# Patient Record
Sex: FEMALE | Race: WHITE | NOT HISPANIC OR LATINO | URBAN - METROPOLITAN AREA
[De-identification: names, ages, dates, MRNs, and addresses within clinical notes are randomized per-mention and may not be internally consistent; named-entity substitution may affect disease eponyms.]

---

## 2017-06-01 ENCOUNTER — OUTPATIENT (OUTPATIENT)
Dept: OUTPATIENT SERVICES | Facility: HOSPITAL | Age: 62
LOS: 1 days | Discharge: HOME | End: 2017-06-01

## 2017-06-28 DIAGNOSIS — R92.8 OTHER ABNORMAL AND INCONCLUSIVE FINDINGS ON DIAGNOSTIC IMAGING OF BREAST: ICD-10-CM

## 2018-01-09 ENCOUNTER — TRANSCRIPTION ENCOUNTER (OUTPATIENT)
Age: 63
End: 2018-01-09

## 2018-01-29 ENCOUNTER — TRANSCRIPTION ENCOUNTER (OUTPATIENT)
Age: 63
End: 2018-01-29

## 2018-03-07 ENCOUNTER — APPOINTMENT (OUTPATIENT)
Dept: PLASTIC SURGERY | Facility: CLINIC | Age: 63
End: 2018-03-07

## 2019-05-28 ENCOUNTER — TRANSCRIPTION ENCOUNTER (OUTPATIENT)
Age: 64
End: 2019-05-28

## 2019-07-21 ENCOUNTER — OUTPATIENT (OUTPATIENT)
Dept: OUTPATIENT SERVICES | Facility: HOSPITAL | Age: 64
LOS: 1 days | Discharge: HOME | End: 2019-07-21
Payer: MEDICARE

## 2019-07-21 DIAGNOSIS — M25.552 PAIN IN LEFT HIP: ICD-10-CM

## 2019-07-21 DIAGNOSIS — M25.551 PAIN IN RIGHT HIP: ICD-10-CM

## 2019-07-21 DIAGNOSIS — M54.5 LOW BACK PAIN: ICD-10-CM

## 2019-07-21 PROCEDURE — 73522 X-RAY EXAM HIPS BI 3-4 VIEWS: CPT | Mod: 26

## 2019-07-21 PROCEDURE — 72110 X-RAY EXAM L-2 SPINE 4/>VWS: CPT | Mod: 26

## 2022-08-22 ENCOUNTER — INPATIENT (INPATIENT)
Facility: HOSPITAL | Age: 67
LOS: 9 days | Discharge: HOME | End: 2022-09-01
Attending: INTERNAL MEDICINE | Admitting: INTERNAL MEDICINE

## 2022-08-22 VITALS
DIASTOLIC BLOOD PRESSURE: 91 MMHG | WEIGHT: 119.05 LBS | SYSTOLIC BLOOD PRESSURE: 209 MMHG | HEART RATE: 76 BPM | OXYGEN SATURATION: 99 % | RESPIRATION RATE: 18 BRPM | TEMPERATURE: 98 F

## 2022-08-22 PROCEDURE — 99053 MED SERV 10PM-8AM 24 HR FAC: CPT

## 2022-08-22 PROCEDURE — 36620 INSERTION CATHETER ARTERY: CPT

## 2022-08-22 PROCEDURE — 99291 CRITICAL CARE FIRST HOUR: CPT | Mod: 25

## 2022-08-22 PROCEDURE — 76937 US GUIDE VASCULAR ACCESS: CPT | Mod: 26

## 2022-08-22 PROCEDURE — 36556 INSERT NON-TUNNEL CV CATH: CPT

## 2022-08-22 RX ORDER — SODIUM CHLORIDE 9 MG/ML
1000 INJECTION INTRAMUSCULAR; INTRAVENOUS; SUBCUTANEOUS ONCE
Refills: 0 | Status: COMPLETED | OUTPATIENT
Start: 2022-08-22 | End: 2022-08-22

## 2022-08-22 RX ADMIN — SODIUM CHLORIDE 1000 MILLILITER(S): 9 INJECTION INTRAMUSCULAR; INTRAVENOUS; SUBCUTANEOUS at 23:57

## 2022-08-22 NOTE — ED PROVIDER NOTE - OBJECTIVE STATEMENT
66 yo F with no significant PMHx presents to the ED c/o moderate left flank pain that started around 9PM while she was watching TV. Pain was sharp, constant and radiated to left lower abdomen. She admits to associated nausea. Upon ED arrival pain was improved. She denies hx of similar symptoms in the past. She denies other complaints. Pt denies fever, chills, vomiting, diarrhea, headache, dizziness, weakness, chest pain, SOB, back pain, LOC, trauma, urinary symptoms, cough, calf pain/swelling, recent travel, recent surgery.

## 2022-08-22 NOTE — ED PROVIDER NOTE - CRITICAL CARE ATTENDING CONTRIBUTION TO CARE
pt co L flank pain rad to left ab. assoc with n, dry heaves. sudden, since 9pm. better now. no fever. no urinary sx.     pt in nad, ctab, rrr, ab soft, nt, nd. no cvat.    labs, imagign, supportive care  reassess    see mdm

## 2022-08-22 NOTE — ED PROVIDER NOTE - NS ED ATTENDING STATEMENT MOD
This was a shared visit with the KOLTON. I reviewed and verified the documentation and independently performed the documented: I have personally provided the amount of critical care time documented below excluding time spent on separate procedures.

## 2022-08-22 NOTE — ED PROVIDER NOTE - PHYSICAL EXAMINATION
VITAL SIGNS: I have reviewed nursing notes and confirm.  CONSTITUTIONAL: 66 yo F laying on stretcher; in no acute distress.  SKIN: Skin exam is warm and dry, no acute rash.  HEAD: Normocephalic; atraumatic.  EYES: Conjunctiva and sclera clear.  ENT: No nasal discharge; airway clear.  CARD: S1, S2 normal; no murmurs, gallops, or rubs. Regular rate and rhythm.  RESP: No wheezes, rales or rhonchi. Speaking in full sentences.   ABD: Normal bowel sounds; soft; non-distended; non-tender; No rebound or guarding. No CVA tenderness.  EXT: Normal ROM. No clubbing, cyanosis or edema.  NEURO: Alert, oriented. Grossly unremarkable. No focal deficits.

## 2022-08-22 NOTE — ED PROVIDER NOTE - CLINICAL SUMMARY MEDICAL DECISION MAKING FREE TEXT BOX
pt with acute abdominal pain, CT shows celiac artery hemmorhage.  pt became hypotensive. IR called, massive transfusion protocol initiated. vascular called.  admit pt to ICU. pt to go to IR asap.

## 2022-08-22 NOTE — ED PROVIDER NOTE - ATTENDING APP SHARED VISIT CONTRIBUTION OF CARE
pt co L flank pain rad to left ab. assoc with n, dry heaves. sudden, since 9pm. better now. no fever. no urinary sx.     pt in nad, ctab, rrr, ab soft, nt, nd. no cvat.    labs, imagign, supportive care  reassess

## 2022-08-22 NOTE — ED PROVIDER NOTE - NS ED ROS FT
Review of Systems  Constitutional:  No fever, chills.  Eyes:  No visual changes, eye pain, or discharge.  ENMT:  No hearing changes, pain, or discharge. No nasal congestion, discharge, or bleeding. No throat pain, swelling, or difficulty swallowing.  Cardiac:  No chest pain, palpitations, syncope, or edema.  Respiratory:  No dyspnea, cough. No hemoptysis.  GI:  No vomiting, diarrhea. (+) abd pain, nausea  :  No dysuria, hematuria, frequency, or burning.   MS:  No back pain. (+) flank pain  Skin:  No skin rash, pruritis, jaundice, or lesions.  Neuro:  No headache, dizziness, loss of sensation, or focal weakness.  No change in mental status.

## 2022-08-23 LAB
ABO RH CONFIRMATION: SIGNIFICANT CHANGE UP
ALBUMIN SERPL ELPH-MCNC: 3.5 G/DL — SIGNIFICANT CHANGE UP (ref 3.5–5.2)
ALBUMIN SERPL ELPH-MCNC: 4.6 G/DL — SIGNIFICANT CHANGE UP (ref 3.5–5.2)
ALP SERPL-CCNC: 60 U/L — SIGNIFICANT CHANGE UP (ref 30–115)
ALP SERPL-CCNC: 76 U/L — SIGNIFICANT CHANGE UP (ref 30–115)
ALT FLD-CCNC: 18 U/L — SIGNIFICANT CHANGE UP (ref 0–41)
ALT FLD-CCNC: 21 U/L — SIGNIFICANT CHANGE UP (ref 0–41)
ANION GAP SERPL CALC-SCNC: 11 MMOL/L — SIGNIFICANT CHANGE UP (ref 7–14)
ANION GAP SERPL CALC-SCNC: 11 MMOL/L — SIGNIFICANT CHANGE UP (ref 7–14)
APPEARANCE UR: CLEAR — SIGNIFICANT CHANGE UP
APTT BLD: 27.7 SEC — SIGNIFICANT CHANGE UP (ref 27–39.2)
APTT BLD: 29.4 SEC — SIGNIFICANT CHANGE UP (ref 27–39.2)
AST SERPL-CCNC: 21 U/L — SIGNIFICANT CHANGE UP (ref 0–41)
AST SERPL-CCNC: 23 U/L — SIGNIFICANT CHANGE UP (ref 0–41)
BASOPHILS # BLD AUTO: 0.01 K/UL — SIGNIFICANT CHANGE UP (ref 0–0.2)
BASOPHILS # BLD AUTO: 0.01 K/UL — SIGNIFICANT CHANGE UP (ref 0–0.2)
BASOPHILS # BLD AUTO: 0.02 K/UL — SIGNIFICANT CHANGE UP (ref 0–0.2)
BASOPHILS # BLD AUTO: 0.03 K/UL — SIGNIFICANT CHANGE UP (ref 0–0.2)
BASOPHILS NFR BLD AUTO: 0.1 % — SIGNIFICANT CHANGE UP (ref 0–1)
BASOPHILS NFR BLD AUTO: 0.1 % — SIGNIFICANT CHANGE UP (ref 0–1)
BASOPHILS NFR BLD AUTO: 0.2 % — SIGNIFICANT CHANGE UP (ref 0–1)
BASOPHILS NFR BLD AUTO: 0.3 % — SIGNIFICANT CHANGE UP (ref 0–1)
BILIRUB DIRECT SERPL-MCNC: <0.2 MG/DL — SIGNIFICANT CHANGE UP (ref 0–0.3)
BILIRUB INDIRECT FLD-MCNC: >0.2 MG/DL — SIGNIFICANT CHANGE UP (ref 0.2–1.2)
BILIRUB SERPL-MCNC: 0.4 MG/DL — SIGNIFICANT CHANGE UP (ref 0.2–1.2)
BILIRUB SERPL-MCNC: 1.5 MG/DL — HIGH (ref 0.2–1.2)
BILIRUB UR-MCNC: NEGATIVE — SIGNIFICANT CHANGE UP
BLD GP AB SCN SERPL QL: SIGNIFICANT CHANGE UP
BUN SERPL-MCNC: 15 MG/DL — SIGNIFICANT CHANGE UP (ref 10–20)
BUN SERPL-MCNC: 16 MG/DL — SIGNIFICANT CHANGE UP (ref 10–20)
CALCIUM SERPL-MCNC: 10 MG/DL — SIGNIFICANT CHANGE UP (ref 8.5–10.1)
CALCIUM SERPL-MCNC: 8.5 MG/DL — SIGNIFICANT CHANGE UP (ref 8.5–10.1)
CHLORIDE SERPL-SCNC: 102 MMOL/L — SIGNIFICANT CHANGE UP (ref 98–110)
CHLORIDE SERPL-SCNC: 110 MMOL/L — SIGNIFICANT CHANGE UP (ref 98–110)
CO2 SERPL-SCNC: 20 MMOL/L — SIGNIFICANT CHANGE UP (ref 17–32)
CO2 SERPL-SCNC: 29 MMOL/L — SIGNIFICANT CHANGE UP (ref 17–32)
COLOR SPEC: SIGNIFICANT CHANGE UP
CREAT SERPL-MCNC: 0.5 MG/DL — LOW (ref 0.7–1.5)
CREAT SERPL-MCNC: 0.8 MG/DL — SIGNIFICANT CHANGE UP (ref 0.7–1.5)
DIFF PNL FLD: NEGATIVE — SIGNIFICANT CHANGE UP
EGFR: 101 ML/MIN/1.73M2 — SIGNIFICANT CHANGE UP
EGFR: 80 ML/MIN/1.73M2 — SIGNIFICANT CHANGE UP
EOSINOPHIL # BLD AUTO: 0 K/UL — SIGNIFICANT CHANGE UP (ref 0–0.7)
EOSINOPHIL # BLD AUTO: 0.01 K/UL — SIGNIFICANT CHANGE UP (ref 0–0.7)
EOSINOPHIL # BLD AUTO: 0.01 K/UL — SIGNIFICANT CHANGE UP (ref 0–0.7)
EOSINOPHIL # BLD AUTO: 0.04 K/UL — SIGNIFICANT CHANGE UP (ref 0–0.7)
EOSINOPHIL NFR BLD AUTO: 0 % — SIGNIFICANT CHANGE UP (ref 0–8)
EOSINOPHIL NFR BLD AUTO: 0.1 % — SIGNIFICANT CHANGE UP (ref 0–8)
EOSINOPHIL NFR BLD AUTO: 0.1 % — SIGNIFICANT CHANGE UP (ref 0–8)
EOSINOPHIL NFR BLD AUTO: 0.4 % — SIGNIFICANT CHANGE UP (ref 0–8)
GLUCOSE SERPL-MCNC: 191 MG/DL — HIGH (ref 70–99)
GLUCOSE SERPL-MCNC: 220 MG/DL — HIGH (ref 70–99)
GLUCOSE UR QL: ABNORMAL
HCT VFR BLD CALC: 33.3 % — LOW (ref 37–47)
HCT VFR BLD CALC: 36.6 % — LOW (ref 37–47)
HCT VFR BLD CALC: 37.6 % — SIGNIFICANT CHANGE UP (ref 37–47)
HCT VFR BLD CALC: 41.6 % — SIGNIFICANT CHANGE UP (ref 37–47)
HGB BLD-MCNC: 11.6 G/DL — LOW (ref 12–16)
HGB BLD-MCNC: 12.3 G/DL — SIGNIFICANT CHANGE UP (ref 12–16)
HGB BLD-MCNC: 12.8 G/DL — SIGNIFICANT CHANGE UP (ref 12–16)
HGB BLD-MCNC: 13.7 G/DL — SIGNIFICANT CHANGE UP (ref 12–16)
IMM GRANULOCYTES NFR BLD AUTO: 0.4 % — HIGH (ref 0.1–0.3)
IMM GRANULOCYTES NFR BLD AUTO: 0.4 % — HIGH (ref 0.1–0.3)
IMM GRANULOCYTES NFR BLD AUTO: 0.5 % — HIGH (ref 0.1–0.3)
IMM GRANULOCYTES NFR BLD AUTO: 0.5 % — HIGH (ref 0.1–0.3)
INR BLD: 1.09 RATIO — SIGNIFICANT CHANGE UP (ref 0.65–1.3)
INR BLD: 1.1 RATIO — SIGNIFICANT CHANGE UP (ref 0.65–1.3)
KETONES UR-MCNC: ABNORMAL
LACTATE SERPL-SCNC: 1.4 MMOL/L — SIGNIFICANT CHANGE UP (ref 0.7–2)
LACTATE SERPL-SCNC: 2.5 MMOL/L — HIGH (ref 0.7–2)
LEUKOCYTE ESTERASE UR-ACNC: NEGATIVE — SIGNIFICANT CHANGE UP
LIDOCAIN IGE QN: 20 U/L — SIGNIFICANT CHANGE UP (ref 7–60)
LYMPHOCYTES # BLD AUTO: 0.46 K/UL — LOW (ref 1.2–3.4)
LYMPHOCYTES # BLD AUTO: 0.7 K/UL — LOW (ref 1.2–3.4)
LYMPHOCYTES # BLD AUTO: 0.84 K/UL — LOW (ref 1.2–3.4)
LYMPHOCYTES # BLD AUTO: 1.04 K/UL — LOW (ref 1.2–3.4)
LYMPHOCYTES # BLD AUTO: 14.3 % — LOW (ref 20.5–51.1)
LYMPHOCYTES # BLD AUTO: 4.5 % — LOW (ref 20.5–51.1)
LYMPHOCYTES # BLD AUTO: 8.7 % — LOW (ref 20.5–51.1)
LYMPHOCYTES # BLD AUTO: 9.5 % — LOW (ref 20.5–51.1)
MAGNESIUM SERPL-MCNC: 1.6 MG/DL — LOW (ref 1.8–2.4)
MCHC RBC-ENTMCNC: 27.6 PG — SIGNIFICANT CHANGE UP (ref 27–31)
MCHC RBC-ENTMCNC: 28 PG — SIGNIFICANT CHANGE UP (ref 27–31)
MCHC RBC-ENTMCNC: 28 PG — SIGNIFICANT CHANGE UP (ref 27–31)
MCHC RBC-ENTMCNC: 28.8 PG — SIGNIFICANT CHANGE UP (ref 27–31)
MCHC RBC-ENTMCNC: 32.9 G/DL — SIGNIFICANT CHANGE UP (ref 32–37)
MCHC RBC-ENTMCNC: 33.6 G/DL — SIGNIFICANT CHANGE UP (ref 32–37)
MCHC RBC-ENTMCNC: 34 G/DL — SIGNIFICANT CHANGE UP (ref 32–37)
MCHC RBC-ENTMCNC: 34.8 G/DL — SIGNIFICANT CHANGE UP (ref 32–37)
MCV RBC AUTO: 80.4 FL — LOW (ref 81–99)
MCV RBC AUTO: 83.4 FL — SIGNIFICANT CHANGE UP (ref 81–99)
MCV RBC AUTO: 83.9 FL — SIGNIFICANT CHANGE UP (ref 81–99)
MCV RBC AUTO: 84.7 FL — SIGNIFICANT CHANGE UP (ref 81–99)
MONOCYTES # BLD AUTO: 0.31 K/UL — SIGNIFICANT CHANGE UP (ref 0.1–0.6)
MONOCYTES # BLD AUTO: 0.44 K/UL — SIGNIFICANT CHANGE UP (ref 0.1–0.6)
MONOCYTES # BLD AUTO: 0.48 K/UL — SIGNIFICANT CHANGE UP (ref 0.1–0.6)
MONOCYTES # BLD AUTO: 0.61 K/UL — HIGH (ref 0.1–0.6)
MONOCYTES NFR BLD AUTO: 4.2 % — SIGNIFICANT CHANGE UP (ref 1.7–9.3)
MONOCYTES NFR BLD AUTO: 4.6 % — SIGNIFICANT CHANGE UP (ref 1.7–9.3)
MONOCYTES NFR BLD AUTO: 4.7 % — SIGNIFICANT CHANGE UP (ref 1.7–9.3)
MONOCYTES NFR BLD AUTO: 8.4 % — SIGNIFICANT CHANGE UP (ref 1.7–9.3)
NEUTROPHILS # BLD AUTO: 5.6 K/UL — SIGNIFICANT CHANGE UP (ref 1.4–6.5)
NEUTROPHILS # BLD AUTO: 6.3 K/UL — SIGNIFICANT CHANGE UP (ref 1.4–6.5)
NEUTROPHILS # BLD AUTO: 8.27 K/UL — HIGH (ref 1.4–6.5)
NEUTROPHILS # BLD AUTO: 9.18 K/UL — HIGH (ref 1.4–6.5)
NEUTROPHILS NFR BLD AUTO: 76.8 % — HIGH (ref 42.2–75.2)
NEUTROPHILS NFR BLD AUTO: 85.5 % — HIGH (ref 42.2–75.2)
NEUTROPHILS NFR BLD AUTO: 85.6 % — HIGH (ref 42.2–75.2)
NEUTROPHILS NFR BLD AUTO: 90.1 % — HIGH (ref 42.2–75.2)
NITRITE UR-MCNC: NEGATIVE — SIGNIFICANT CHANGE UP
NRBC # BLD: 0 /100 WBCS — SIGNIFICANT CHANGE UP (ref 0–0)
PH UR: 8 — SIGNIFICANT CHANGE UP (ref 5–8)
PLATELET # BLD AUTO: 149 K/UL — SIGNIFICANT CHANGE UP (ref 130–400)
PLATELET # BLD AUTO: 170 K/UL — SIGNIFICANT CHANGE UP (ref 130–400)
PLATELET # BLD AUTO: 205 K/UL — SIGNIFICANT CHANGE UP (ref 130–400)
PLATELET # BLD AUTO: 206 K/UL — SIGNIFICANT CHANGE UP (ref 130–400)
POTASSIUM SERPL-MCNC: 4.5 MMOL/L — SIGNIFICANT CHANGE UP (ref 3.5–5)
POTASSIUM SERPL-MCNC: 4.9 MMOL/L — SIGNIFICANT CHANGE UP (ref 3.5–5)
POTASSIUM SERPL-SCNC: 4.5 MMOL/L — SIGNIFICANT CHANGE UP (ref 3.5–5)
POTASSIUM SERPL-SCNC: 4.9 MMOL/L — SIGNIFICANT CHANGE UP (ref 3.5–5)
PROT SERPL-MCNC: 5.3 G/DL — LOW (ref 6–8)
PROT SERPL-MCNC: 6.8 G/DL — SIGNIFICANT CHANGE UP (ref 6–8)
PROT UR-MCNC: SIGNIFICANT CHANGE UP
PROTHROM AB SERPL-ACNC: 12.5 SEC — SIGNIFICANT CHANGE UP (ref 9.95–12.87)
PROTHROM AB SERPL-ACNC: 12.6 SEC — SIGNIFICANT CHANGE UP (ref 9.95–12.87)
RBC # BLD: 4.14 M/UL — LOW (ref 4.2–5.4)
RBC # BLD: 4.39 M/UL — SIGNIFICANT CHANGE UP (ref 4.2–5.4)
RBC # BLD: 4.44 M/UL — SIGNIFICANT CHANGE UP (ref 4.2–5.4)
RBC # BLD: 4.96 M/UL — SIGNIFICANT CHANGE UP (ref 4.2–5.4)
RBC # FLD: 13.7 % — SIGNIFICANT CHANGE UP (ref 11.5–14.5)
RBC # FLD: 13.8 % — SIGNIFICANT CHANGE UP (ref 11.5–14.5)
RBC # FLD: 14.2 % — SIGNIFICANT CHANGE UP (ref 11.5–14.5)
RBC # FLD: 14.3 % — SIGNIFICANT CHANGE UP (ref 11.5–14.5)
SARS-COV-2 RNA SPEC QL NAA+PROBE: SIGNIFICANT CHANGE UP
SODIUM SERPL-SCNC: 141 MMOL/L — SIGNIFICANT CHANGE UP (ref 135–146)
SODIUM SERPL-SCNC: 142 MMOL/L — SIGNIFICANT CHANGE UP (ref 135–146)
SP GR SPEC: 1.02 — SIGNIFICANT CHANGE UP (ref 1.01–1.03)
UROBILINOGEN FLD QL: SIGNIFICANT CHANGE UP
WBC # BLD: 10.19 K/UL — SIGNIFICANT CHANGE UP (ref 4.8–10.8)
WBC # BLD: 7.29 K/UL — SIGNIFICANT CHANGE UP (ref 4.8–10.8)
WBC # BLD: 7.37 K/UL — SIGNIFICANT CHANGE UP (ref 4.8–10.8)
WBC # BLD: 9.67 K/UL — SIGNIFICANT CHANGE UP (ref 4.8–10.8)
WBC # FLD AUTO: 10.19 K/UL — SIGNIFICANT CHANGE UP (ref 4.8–10.8)
WBC # FLD AUTO: 7.29 K/UL — SIGNIFICANT CHANGE UP (ref 4.8–10.8)
WBC # FLD AUTO: 7.37 K/UL — SIGNIFICANT CHANGE UP (ref 4.8–10.8)
WBC # FLD AUTO: 9.67 K/UL — SIGNIFICANT CHANGE UP (ref 4.8–10.8)

## 2022-08-23 PROCEDURE — 74174 CTA ABD&PLVS W/CONTRAST: CPT | Mod: 26,MA

## 2022-08-23 PROCEDURE — 99221 1ST HOSP IP/OBS SF/LOW 40: CPT

## 2022-08-23 PROCEDURE — 36247 INS CATH ABD/L-EXT ART 3RD: CPT | Mod: RT

## 2022-08-23 PROCEDURE — G1004: CPT

## 2022-08-23 PROCEDURE — 36248 INS CATH ABD/L-EXT ART ADDL: CPT

## 2022-08-23 PROCEDURE — 74177 CT ABD & PELVIS W/CONTRAST: CPT | Mod: 26,59,ME

## 2022-08-23 PROCEDURE — 37242 VASC EMBOLIZE/OCCLUDE ARTERY: CPT

## 2022-08-23 PROCEDURE — 76937 US GUIDE VASCULAR ACCESS: CPT | Mod: 26

## 2022-08-23 PROCEDURE — 71045 X-RAY EXAM CHEST 1 VIEW: CPT | Mod: 26

## 2022-08-23 RX ORDER — ACETAMINOPHEN 500 MG
1000 TABLET ORAL ONCE
Refills: 0 | Status: COMPLETED | OUTPATIENT
Start: 2022-08-23 | End: 2022-08-23

## 2022-08-23 RX ORDER — CHLORHEXIDINE GLUCONATE 213 G/1000ML
1 SOLUTION TOPICAL
Refills: 0 | Status: DISCONTINUED | OUTPATIENT
Start: 2022-08-23 | End: 2022-09-01

## 2022-08-23 RX ORDER — FENTANYL CITRATE 50 UG/ML
50 INJECTION INTRAVENOUS ONCE
Refills: 0 | Status: DISCONTINUED | OUTPATIENT
Start: 2022-08-23 | End: 2022-08-23

## 2022-08-23 RX ORDER — ONDANSETRON 8 MG/1
4 TABLET, FILM COATED ORAL ONCE
Refills: 0 | Status: COMPLETED | OUTPATIENT
Start: 2022-08-23 | End: 2022-08-23

## 2022-08-23 RX ORDER — ACETAMINOPHEN 500 MG
650 TABLET ORAL EVERY 6 HOURS
Refills: 0 | Status: DISCONTINUED | OUTPATIENT
Start: 2022-08-23 | End: 2022-09-01

## 2022-08-23 RX ORDER — MORPHINE SULFATE 50 MG/1
4 CAPSULE, EXTENDED RELEASE ORAL ONCE
Refills: 0 | Status: DISCONTINUED | OUTPATIENT
Start: 2022-08-23 | End: 2022-08-23

## 2022-08-23 RX ORDER — MAGNESIUM SULFATE 500 MG/ML
2 VIAL (ML) INJECTION ONCE
Refills: 0 | Status: COMPLETED | OUTPATIENT
Start: 2022-08-23 | End: 2022-08-23

## 2022-08-23 RX ADMIN — FENTANYL CITRATE 50 MICROGRAM(S): 50 INJECTION INTRAVENOUS at 06:24

## 2022-08-23 RX ADMIN — Medication 400 MILLIGRAM(S): at 21:51

## 2022-08-23 RX ADMIN — MORPHINE SULFATE 4 MILLIGRAM(S): 50 CAPSULE, EXTENDED RELEASE ORAL at 05:08

## 2022-08-23 RX ADMIN — Medication 1000 MILLIGRAM(S): at 22:15

## 2022-08-23 RX ADMIN — ONDANSETRON 4 MILLIGRAM(S): 8 TABLET, FILM COATED ORAL at 13:15

## 2022-08-23 RX ADMIN — FENTANYL CITRATE 50 MICROGRAM(S): 50 INJECTION INTRAVENOUS at 07:24

## 2022-08-23 RX ADMIN — Medication 25 GRAM(S): at 12:42

## 2022-08-23 NOTE — ED PROCEDURE NOTE - CPROC ED TIME OUT STATEMENT1
“Patient's name, , procedure and correct site were confirmed during the [place] Timeout.”
“Patient's name, , procedure and correct site were confirmed during the [place] Timeout.”

## 2022-08-23 NOTE — H&P ADULT - NSHPLABSRESULTS_GEN_ALL_CORE
LABS:  cret                        12.3   7.37  )-----------( 206      ( 23 Aug 2022 04:40 )             36.6     08-23    142  |  102  |  16  ----------------------------<  191<H>  4.9   |  29  |  0.8    Ca    10.0      23 Aug 2022 00:10    TPro  6.8  /  Alb  4.6  /  TBili  0.4  /  DBili  <0.2  /  AST  23  /  ALT  21  /  AlkPhos  76  08-23    PT/INR - ( 23 Aug 2022 04:40 )   PT: 12.60 sec;   INR: 1.10 ratio         PTT - ( 23 Aug 2022 04:40 )  PTT:29.4 sec      imaging:   - CT a/p IV con: Large volume dense ascites compatible with hemoperitoneum. Active contrast extravasation is seen in the subhepatic region, likely arising from a distal celiac branch vessel in the hepatic territory. A possible second smaller region of active extravasation is seen adjacent to a vessel in the region of the pancreatic tail (4-48, 4-43). A dual source of bleeding is not excluded, and a CTA may be considered for further evaluation.  - CT a/p angio: Mildly increased large volume hemoperitoneum with prominent active extravasation in the subhepatic region originating from a distal celiac branch in the hepatic territory. A second focus of extravasation is not seen on the current exam.

## 2022-08-23 NOTE — PATIENT PROFILE ADULT - FUNCTIONAL ASSESSMENT - BASIC MOBILITY 6.
3-calculated by average/Not able to assess (calculate score using [facility] averaging method)

## 2022-08-23 NOTE — ED PROCEDURE NOTE - NS ED ATTENDING STATEMENT MOD
This was a shared visit with the KOLTON. I reviewed and verified the documentation and independently performed the documented:
Attending with

## 2022-08-23 NOTE — PROGRESS NOTE ADULT - SUBJECTIVE AND OBJECTIVE BOX
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 The right gastric artery had a focal pseudoaneyrsm with diffuse spasm. The right gastric artery was coiled with four 2 mm penumbra coils. Post embolization revealed stasis of the vessel. Angiogram of the superior mesenteric artery revealed additional vascular irregularities and psueduaneurysms without active extravasation.    Specimens Removed: None    Implants: 2 mm low profile penumbra coils x 4    Complications: None    Condition/Disposition:  Ok to return to unit.   Please keep right lower extremity completely straight for 4 hours.      Please call Interventional Radiology [phone] with any questions, concerns, or issues.         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 The right gastric artery had a focal pseudoaneyrsm with diffuse spasm. Based on CT findings this was the artery that was bleeding and therefore it was decided to embolize the artery.  The right gastric artery was coiled with four 2 mm penumbra coils. Post embolization revealed stasis of the vessel with no evidence of non-target embolization, there was onobstructed flow to the left hepatic artery. Angiogram of the superior mesenteric artery revealed additional vascular irregularities and psueduaneurysms without active extravasation. 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     Specimens Removed: None    Implants: 2 mm low profile penumbra coils x 4    Complications: None    Condition/Disposition:  Ok to return to unit.   Please keep right lower extremity completely straight for 4 hours.      Please call Interventional Radiology [phone] with any questions, concerns, or issues.

## 2022-08-23 NOTE — PROCEDURAL SAFETY CHECKLIST WITH OR WITHOUT SEDATION - NSPOSTCOMMENTFT_GEN_ALL_CORE
Right IJ placed in ED by NICHO Medrano. Central Line sutured into place. Pt tolerated procedure well, sterility maintained throughout.
Right radial A-Line Cath placed in ED. Pt tolerated procedure well. Sterility maintained throughout procedure.

## 2022-08-23 NOTE — H&P ADULT - HISTORY OF PRESENT ILLNESS
66yo F w/ no significant pmhx presented to ED with left flank pain that started around 9pm last night. Patient was relaxing/watching TV when she suddenly felt this and presented to the ED. Initially patient was hypertensive and there was an initial concern for possible kidney stone. However CT a/p showed hemoperitoneum with active extravesation of celiac artery. Patient denied any past trauma or falls. Denies being on any medications including blood thinners. Denies all other complaints except her left flank/back pain.     Patient then became hemodynamically unstable BP 79/40 and became dizzy and diaphoretic. Code fusion was called. Patient received 4 total prbcs and will get 1u of ffp and platelets. IR was called and are planning for emergent embolization. Vascular also on board.      labs: hgb 13.7 -> 12.3, coags wnl, lactate 2.5 -> 1.4  imaging:   - CT a/p IV con: Large volume dense ascites compatible with hemoperitoneum. Active contrast extravasation is seen in the subhepatic region, likely arising from a distal celiac branch vessel in the hepatic territory. A possible second smaller region of active extravasation is seen adjacent to a vessel in the region of the pancreatic tail (4-48, 4-43). A dual source of bleeding is not excluded, and a CTA may be considered for further evaluation.  - CT a/p angio: Mildly increased large volume hemoperitoneum with prominent active extravasation in the subhepatic region originating from a distal celiac branch in the hepatic territory. A second focus of extravasation is not seen on the current exam.

## 2022-08-23 NOTE — PROCEDURAL SAFETY CHECKLIST WITH OR WITHOUT SEDATION - NSPREIMAGEREVIEW_GEN_ALL_CORE
History Of Present Illness





59 y/o female comes in to ED complaining of a 5 day history of body aches, nasal

congestion, productive cough with green mucus, headache, sore throat, and runny 

nose. Patient states that 2 days ago after she ate melon, she felt her lips and 

tongue become itchy. Patient reports taking claritin and the itching resolved. 

She denies any sick contacts, chest pain, SOB, abdominal pain, nausea, vomiting,

or diarrhea.





Time Seen by Provider: 02/16/19 10:50


Chief Complaint (Nursing): Cough, Cold, Congestion


History Per: Patient


History/Exam Limitations: no limitations


Onset/Duration Of Symptoms: Days


Current Symptoms Are (Timing): Still Present





Past Medical History


Reviewed: Historical Data, Nursing Documentation, Vital Signs


Vital Signs: 





                                Last Vital Signs











Temp  98.2 F   02/16/19 10:53


 


Pulse  76   02/16/19 10:53


 


Resp  18   02/16/19 10:53


 


BP      


 


Pulse Ox  99   02/16/19 10:53














- Medical History


PMH: Asthma, Back Problems, HTN


   Denies: Chronic Kidney Disease





- [facility] Procedures











INJECT/INFUSE NEC (11/14/13)








Family History: States: No Known Family Hx





- Social History


Hx Tobacco Use: No


Hx Alcohol Use: No


Hx Substance Use: No





- Immunization History


Hx Tetanus Toxoid Vaccination: No


Hx Influenza Vaccination: Yes


Hx Pneumococcal Vaccination: Yes





Review Of Systems


Constitutional: Positive for: Other (Body aches).  Negative for: Fever, Chills


ENT: Positive for: Nose Discharge, Nose Congestion, Throat Pain (sore throat)


Cardiovascular: Negative for: Chest Pain


Respiratory: Positive for: Cough (productive), Sputum (green).  Negative for: 

Shortness of Breath


Gastrointestinal: Negative for: Nausea, Vomiting, Abdominal Pain, Diarrhea


Neurological: Positive for: Headache





Physical Exam





- Physical Exam


Appears: Non-toxic, No Acute Distress


Skin: Warm, Dry


Head: Atraumatic, Normacephalic


Eye(s): bilateral: Normal Inspection


Nose: Other (rhinorrhea)


Oral Mucosa: Moist


Throat: Erythema (mild), No Exudate, No Other (Swelling)


Neck: Supple


Cardiovascular: Rhythm Regular, No Murmur


Respiratory: No Decreased Breath Sounds, No Rales, No Rhonchi, Wheezing 

(expiratory wheezing), Other (coughing intermittently)


Extremity: Bilateral: Atraumatic, Normal Color And Temperature, Normal ROM


Neurological/Psych: Oriented x3, Normal Speech





ED Course And Treatment


O2 Sat by Pulse Oximetry: 99 (RA)


Pulse Ox Interpretation: Normal





- Other Rad


  ** CXR


X-Ray: Read By Radiologist


Interpretation: FINDINGS:  LUNGS:  No active pulmonary disease.  PLEURA:  No 

significant pleural effusion identified. No pneumothorax apparent.  

CARDIOVASCULAR:  No aortic atherosclerotic calcification present.  Normal 

cardiac size. No pulmonary vascular congestion.  OSSEOUS STRUCTURES:  No 

significant abnormalities.  VISUALIZED UPPER ABDOMEN:  Normal.  OTHER FINDINGS: 

None.  IMPRESSION:  No active disease.


Progress Note: Chest XR ordered, showed no infiltrates. Patient was given 

albuterol, tessalon, and motrin. On re-evaluation, patient is feeling better and

is in no acute distress. Patient will be discharged home.





Disposition


Counseled Patient/Family Regarding: Diagnosis, Need For Followup, Rx Given, Smo

romulo Cessation





- Disposition


Referrals: 


[facility] [Outside]


Disposition: HOME/ ROUTINE


Disposition Time: 12:35


Condition: STABLE


Additional Instructions: 


FOLLOW UP WITH YOUR DOCTOR IN 1-2 DAYS





USE MEDICATIONS AS DIRECTED





RETURN TO EMERGENCY ROOM IF YOUR SYMPTOMS BECOME WORSE











SEGUIR CON TORRES MDICO EN 1-2 CUELLAR





UTILICE MEDICAMENTOS SUNNI SE DIRIGE





VUELVA A LA HEATHER DE EMERGENCIA SI ZOILA SNTOMAS SE HACEN PEOR


Prescriptions: 


Albuterol HFA [Ventolin HFA 90 mcg/actuation (8 g)] 0.09 mg IH Q4 PRN #1 puff


 PRN Reason: Wheezing


Benzonatate [Tessalon Perles] 100 mg PO BID PRN #15 sgl


 PRN Reason: Cough


Ibuprofen [Motrin Tab] 600 mg PO Q6 PRN #30 tab


 PRN Reason: fever/pain


Phenol/Glycerin [Chloraseptic Max Spray] 1 spray MM Q6 PRN #1 spray


 PRN Reason: THROAT PAIN


Instructions:  Upper Respiratory Infection (ED)


Forms:  [facility] (English)


Print Language: Micronesian





- Clinical Impression


Clinical Impression: 


 Upper respiratory infection, Viral disease








- Scribe Statement


The provider has reviewed the documentation as recorded by the Dio Fang





Provider Attestation: 





All medical record entries made by the Scribe were at my direction and 

personally dictated by me. I have reviewed the chart and agree that the record 

accurately reflects my personal performance of the history, physical exam, 

medical decision making, and the department course for this patient. I have also

personally directed, reviewed, and agree with the discharge instructions and 

disposition.
not applicable
not applicable

## 2022-08-23 NOTE — CONSULT NOTE ADULT - SUBJECTIVE AND OBJECTIVE BOX
SURGERY CONSULT NOTE    Patient: KEN ROBERSON , 67y (51)Female   MRN: [id number]  Location: [facility]  Visit: 22 Inpatient  Date: 22 @ 12:12    "HPI:  68yo F w/ no significant pmhx presented to ED with left flank pain that started around 9pm last night. Patient was relaxing/watching TV when she suddenly felt this and presented to the ED. Initially patient was hypertensive and there was an initial concern for possible kidney stone. However CT a/p showed hemoperitoneum with active extravesation of celiac artery. Patient denied any past trauma or falls. Denies being on any medications including blood thinners. Denies all other complaints except her left flank/back pain.     Patient then became hemodynamically unstable BP 79/40 and became dizzy and diaphoretic. Code fusion was called. Patient received 4 total prbcs and will get 1u of ffp and platelets. IR was called and are planning for emergent embolization. Vascular also on board.      labs: hgb 13.7 -> 12.3, coags wnl, lactate 2.5 -> 1.4  imaging:   - CT a/p IV con: Large volume dense ascites compatible with hemoperitoneum. Active contrast extravasation is seen in the subhepatic region, likely arising from a distal celiac branch vessel in the hepatic territory. A possible second smaller region of active extravasation is seen adjacent to a vessel in the region of the pancreatic tail (4-48, 4-43). A dual source of bleeding is not excluded, and a CTA may be considered for further evaluation.  - CT a/p angio: Mildly increased large volume hemoperitoneum with prominent active extravasation in the subhepatic region originating from a distal celiac branch in the hepatic territory. A second focus of extravasation is not seen on the current exam.   (23 Aug 2022 06:30)  "    Reason for Surgical Consult: Hemoperitoneum      PAST MEDICAL & SURGICAL HISTORY:  No pertinent past medical history      No significant past surgical history          Home Medications:        VITALS:  T(F): 97.2 (22 @ 08:20), Max: 98.3 (22 @ 04:35)  HR: 73 (22 @ 09:32) (64 - 86)  BP: 170/79 (22 @ 09:32) (79/41 - 209/91)  RR: 24 (22 @ 09:32) (17 - 24)  SpO2: 99% (22 @ 09:32) (98% - 100%)    PHYSICAL EXAM:  General: NAD, AAOx3  Cardiac: RRR  Respiratory: Unlabored breathing at rest  Abdomen: soft, non-distended, tender to light palpation in all quadrants, +guarding, +rebound, peritonitic.  Musculoskeletal: Strength 5/5 BL UE/LE, ROM intact, compartments soft  Neuro: Sensation grossly intact and equal throughout, no focal deficits  Skin: Warm/dry, normal color, no jaundice      MEDICATIONS  (STANDING):  chlorhexidine 2% Cloths 1 Application(s) Topical <User Schedule>  magnesium sulfate  IVPB 2 Gram(s) IV Intermittent once    MEDICATIONS  (PRN):  acetaminophen     Tablet .. 650 milliGRAM(s) Oral every 6 hours PRN Temp greater or equal to 38C (100.4F), Mild Pain (1 - 3)      LAB/STUDIES:                        12.8   10.19 )-----------( 170      ( 23 Aug 2022 07:50 )             37.6     08    141  |  110  |  15  ----------------------------<  220<H>  4.5   |  20  |  0.5<L>    Ca    8.5      23 Aug 2022 07:50  Mg     1.6         TPro  5.3<L>  /  Alb  3.5  /  TBili  1.5<H>  /  DBili  x   /  AST  21  /  ALT  18  /  AlkPhos  60      PT/INR - ( 23 Aug 2022 07:50 )   PT: 12.50 sec;   INR: 1.09 ratio         PTT - ( 23 Aug 2022 07:50 )  PTT:27.7 sec  LIVER FUNCTIONS - ( 23 Aug 2022 07:50 )  Alb: 3.5 g/dL / Pro: 5.3 g/dL / ALK PHOS: 60 U/L / ALT: 18 U/L / AST: 21 U/L / GGT: x           Urinalysis Basic - ( 23 Aug 2022 00:10 )    Color: Light Yellow / Appearance: Clear / S.019 / pH: x  Gluc: x / Ketone: Moderate  / Bili: Negative / Urobili: <2 mg/dL   Blood: x / Protein: Trace / Nitrite: Negative   Leuk Esterase: Negative / RBC: x / WBC x   Sq Epi: x / Non Sq Epi: x / Bacteria: x      IMAGING:  < from: CT Abdomen and Pelvis w/ IV Cont (22 @ 03:00) >  Large volume dense ascites compatible with hemoperitoneum. Active   contrast extravasation is seen in the subhepatic region, likely arising   from a distal celiac branch vessel in the hepatic territory. A possible   second smaller region of active extravasation is seen adjacent to a   vessel in the region of the pancreatic tail (4-48, 4-43). A dual source   of bleeding is not excluded, and a CTA may be considered for further   evaluation.    Colonic wall thickening versus underdistention extending from the hepatic   flexure through sigmoid colon, areas of small bowel wall thickening.   Findings may reflect developing enteritis/colitis, which could be due to   infection, inflammation, or ischemia. These findings can also be   exaggerated by the bowel underdistention.    < end of copied text >      < from: CT Angio Abdomen and Pelvis w/ IV Cont (22 @ 05:30) >  IMPRESSION:    Mildly increased large volume hemoperitoneum with prominent active   extravasation in the subhepatic region originating from a distal celiac   branch in the hepatic territory. A second focus of extravasation is not   seen on the current exam.    Unchanged apparent thickening of the transverse through sigmoid colon and   areas of the small bowel. In the setting of hypotension,   ischemic/hypoperfusion etiology should be considered.    Otherwise unchanged.    < end of copied text >

## 2022-08-23 NOTE — H&P ADULT - NSHPPHYSICALEXAM_GEN_ALL_CORE
PHYSICAL EXAM:  GENERAL: NAD, lying in bed comfortably  HEAD:  Atraumatic, Normocephalic  EYES: EOMI, PERRLA, conjunctiva and sclera clear  ENT: Moist mucous membranes  NECK: Supple, No JVD  CHEST/LUNG: Clear to auscultation bilaterally; No rales, rhonchi, wheezing, or rubs. Unlabored respirations  HEART: Regular rate and rhythm; No murmurs, rubs, or gallops  ABDOMEN: tenderness to palpation in left flank area   EXTREMITIES:  2+ Peripheral Pulses, brisk capillary refill. No clubbing, cyanosis, or edema  NERVOUS SYSTEM:  Alert & Oriented X3, speech clear. No deficits   MSK: FROM all 4 extremities, full and equal strength  SKIN: No rashes or lesions

## 2022-08-23 NOTE — ED ADULT NURSE NOTE - OBJECTIVE STATEMENT
Patient AOx3, ambulatory, c/o left lower abdominal pain radiating to back. Denies urinary symptoms or n/v/d

## 2022-08-23 NOTE — CONSULT NOTE ADULT - ASSESSMENT
67F w/ no PMH/PSH who presents with acute onset abdominal pain that started 8/22/22 around 8pm, progressively worsened overnight prompting visit to the ED, found to be hypotensive in ED and with large volume hemoperitoneum seen on CT, CTA performed showing active extravasation likely stemming from a downstream branch of the celiac axis.  General surgery and Vascular surgery teams were consulted and following.  MTP called and patient ultimately received 5uRBC, 1FFP, 1PLT.  Throughout hospital course patient remained AAOx3 and saturating well on RA, R radial Mariely and RIJ TLC placed in ED and BP responded to transfusions with MAPs persistently above 65, no pressors required.  Patient underwent emergent IR embolization of right gastric artery which was demonstrated to have active bleeding on angio.  Intraprocedural findings were significant for multiple aneurysms visualized throughout most small to medium sized vessels, including intrahepatic and splenic vessels, suspicious for underlying vasculitis.  Source of bleeding suspected to be from rupture of one of these aneurysms.  Post-procedure patient admitted to CCU unit.    Plan:   -ICU level of care for hemodynamic monitoring   -Workup for suspected vasculitides   -FU vascular surgery plan   -Surgery to follow.  At this time patient is hemodynamically stable and suspected source of hemoperitoneum controlled, no acute surgical intervention needed.   -Strict I/O   -Rest of care per ICU team

## 2022-08-23 NOTE — PATIENT PROFILE ADULT - FALL HARM RISK - HARM RISK INTERVENTIONS
Assistance with ambulation/Assistance OOB with selected safe patient handling equipment/Communicate Risk of Fall with Harm to all staff/Monitor for mental status changes/Reinforce activity limits and safety measures with patient and family/Tailored Fall Risk Interventions/Use of alarms - bed, chair and/or voice tab/Visual Cue: Yellow wristband and red socks/Bed in lowest position, wheels locked, appropriate side rails in place/Call bell, personal items and telephone in reach/Instruct patient to call for assistance before getting out of bed or chair/Non-slip footwear when patient is out of bed/[place] to call system/Physically safe environment - no spills, clutter or unnecessary equipment/Purposeful Proactive Rounding/Room/bathroom lighting operational, light cord in reach

## 2022-08-23 NOTE — H&P ADULT - ASSESSMENT
IMPRESSION:    PLAN:    CNS: No CNS depressants     HEENT: HOB 45 degrees     PULMONARY: supplemental O2 as needed to keep SPO2 > 94%    CARDIOVASCULAR: avoid volume overload, keep map above 65     GI: NPO     RENAL: f/u lytes     INFECTIOUS DISEASE: monitor off abx     HEMATOLOGICAL:  given 4u prbcs; give 1u ffp, 1u platelets; IR for embolization today; vascular surgery on board     ENDOCRINE:  Follow up FS.  Insulin protocol if needed.    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  Insulin protocol if needed.    MUSCULOSKELETAL: bedrest

## 2022-08-23 NOTE — CONSULT NOTE ADULT - ASSESSMENT
66yo F w/ no significant pmhx presented to ED with left flank pain that started around 9pm last night. Patient was relaxing/watching TV when she suddenly felt this and presented to the ED. Initially patient was hypertensive and there was an initial concern for possible kidney stone. However CT a/p showed hemoperitoneum with active extravesation of hepatic branch. Patient denied any past trauma or falls. Denies being on any medications including blood thinners. Denies all other complaints except her left flank/back pain.     Patient then became hemodynamically unstable BP 79/40 and became dizzy and diaphoretic. Code fusion was called. Patient received 4 total prbcs and will get 1u of ffp and platelets. IR was called and are planning for emergent embolization. Vascular also on board.  - I reviewed today's labs  - I reviewed and personally visualized all the radiology imagings  - I discussed the plan with attending Dr. Hanson  - re-evaluate after procedure with IR     SPECTRA [id number]

## 2022-08-23 NOTE — CONSULT NOTE ADULT - SUBJECTIVE AND OBJECTIVE BOX
Detail Level: Zone VASCULAR SURGERY CONSULT NOTE      HPI:  68yo F w/ no significant pmhx presented to ED with left flank pain that started around 9pm last night. Patient was relaxing/watching TV when she suddenly felt this and presented to the ED. Initially patient was hypertensive and there was an initial concern for possible kidney stone. However CT a/p showed hemoperitoneum with active extravesation of hepatic branch. Patient denied any past trauma or falls. Denies being on any medications including blood thinners. Denies all other complaints except her left flank/back pain.     Patient then became hemodynamically unstable BP 79/40 and became dizzy and diaphoretic. Code fusion was called. Patient received 4 total prbcs and will get 1u of ffp and platelets. IR was called and are planning for emergent embolization. Vascular also on board.      labs: hgb 13.7 -> 12.3, coags wnl, lactate 2.5 -> 1.4  imaging:   - CT a/p IV con: Large volume dense ascites compatible with hemoperitoneum. Active contrast extravasation is seen in the subhepatic region, likely arising from a distal celiac branch vessel in the hepatic territory. A possible second smaller region of active extravasation is seen adjacent to a vessel in the region of the pancreatic tail (4-48, 4-43). A dual source of bleeding is not excluded, and a CTA may be considered for further evaluation.  - CT a/p angio: Mildly increased large volume hemoperitoneum with prominent active extravasation in the subhepatic region originating from a distal celiac branch in the hepatic territory. A second focus of extravasation is not seen on the current exam.   (23 Aug 2022 06:30)        PAST MEDICAL & SURGICAL HISTORY:  No pertinent past medical history      No significant past surgical history        No Known Allergies    Home Medications:    No permtinent family history of PVD    REVIEW OF SYSTEMS:  GENERAL:                                         negative  SKIN:                                                 negative  OPTHALMOLOGIC:                          negative  ENMT:                                               negative  RESPIRATORY AND THORAX:        negative  CARDIOVASCULAR:                         negative  GASTROINTESTINAL:                       negative  NEPHROLOGY:                                  negative  MUSCULOSKELETAL:                       negative  NEUROLOGIC:                                   negative  PSYCHIATRIC:                                    negative  HEMATOLOGY/LYMPHATICS:         negative  ENDOCRINE:                                     negative  ALLERGIC/IMMUNOLOGIC:            negative    12 point ROS otherwise normal except as stated in HPI  FHx: none  SHX:  [ ]  smoking     [ ] alcohol use    PHYSICAL EXAM  Vital Signs Last 24 Hrs  T(C): 36.7 (23 Aug 2022 07:35), Max: 36.8 (23 Aug 2022 04:35)  T(F): 98 (23 Aug 2022 07:35), Max: 98.3 (23 Aug 2022 04:35)  HR: 72 (23 Aug 2022 07:15) (64 - 86)  BP: 106/55 (23 Aug 2022 06:35) (79/41 - 209/91)  BP(mean): 79 (23 Aug 2022 06:35) (54 - 93)  RR: 17 (23 Aug 2022 07:15) (17 - 20)  SpO2: 100% (23 Aug 2022 07:15) (98% - 100%)    Parameters below as of 23 Aug 2022 07:15  Patient On (Oxygen Delivery Method): room air        Appearance: Normal	  HEENT:   Normal oral mucosa, PERRL, EOMI	  Neck: Supple, - JVD;   Cardiovascular: Normal S1 S2, No JVD, No murmurs,   Respiratory: Lungs clear to auscultation, No Rales, Rhonchi, Wheezing	  Gastrointestinal: slightly distended, Non-tender, positive BS	  Skin: No rashes, No ecchymoses, No cyanosis  Extremities: Normal range of motion, No clubbing, cyanosis or edema  Vascular: Peripheral pulses palpable 2+ bilaterally  Neurologic: Non-focal  Psychiatry: A & O x 3, Mood & affect appropriate          MEDICATIONS:   MEDICATIONS  (STANDING):  chlorhexidine 2% Cloths 1 Application(s) Topical <User Schedule>    MEDICATIONS  (PRN):  acetaminophen     Tablet .. 650 milliGRAM(s) Oral every 6 hours PRN Temp greater or equal to 38C (100.4F), Mild Pain (1 - 3)      LAB/STUDIES:                        12.8   10.19 )-----------( 170      ( 23 Aug 2022 07:50 )             37.6     08-23    142  |  102  |  16  ----------------------------<  191<H>  4.9   |  29  |  0.8    Ca    10.0      23 Aug 2022 00:10    TPro  6.8  /  Alb  4.6  /  TBili  0.4  /  DBili  <0.2  /  AST  23  /  ALT  21  /  AlkPhos  76  08-23    PT/INR - ( 23 Aug 2022 07:50 )   PT: 12.50 sec;   INR: 1.09 ratio         PTT - ( 23 Aug 2022 07:50 )  PTT:27.7 sec  LIVER FUNCTIONS - ( 23 Aug 2022 00:10 )  Alb: 4.6 g/dL / Pro: 6.8 g/dL / ALK PHOS: 76 U/L / ALT: 21 U/L / AST: 23 U/L / GGT: x             Urinalysis Basic - ( 23 Aug 2022 00:10 )    Color: Light Yellow / Appearance: Clear / S.019 / pH: x  Gluc: x / Ketone: Moderate  / Bili: Negative / Urobili: <2 mg/dL   Blood: x / Protein: Trace / Nitrite: Negative   Leuk Esterase: Negative / RBC: x / WBC x   Sq Epi: x / Non Sq Epi: x / Bacteria: x        IMAGING:    < from: CT Angio Abdomen and Pelvis w/ IV Cont (22 @ 05:30) >  No evidence of aneurysm or significant stenosis. Origins   of the celiac axis, SMA,lateral renal arteries, DIONICIO, and bilateral   common iliac arteries are patent. There is significant active   extravasation into the abdomen in the subhepatic region, likely   originating from a single branch within the hepatic territory (7-145).   There is apparent spasm of additional distal celiac territory branches,   possibly secondary to ischemia. A second focus of active extravasation is   not seen on the current examination.      < end of copied text >

## 2022-08-23 NOTE — ED PROCEDURE NOTE - CPROC ED INDICATIONS1
emergency venous access/hypertonic/irritant infusion/volume resuscitation
critical patient/monitoring purposes/transfusion

## 2022-08-24 LAB
ALBUMIN SERPL ELPH-MCNC: 3.3 G/DL — LOW (ref 3.5–5.2)
ALP SERPL-CCNC: 49 U/L — SIGNIFICANT CHANGE UP (ref 30–115)
ALT FLD-CCNC: 13 U/L — SIGNIFICANT CHANGE UP (ref 0–41)
ANION GAP SERPL CALC-SCNC: 8 MMOL/L — SIGNIFICANT CHANGE UP (ref 7–14)
AST SERPL-CCNC: 14 U/L — SIGNIFICANT CHANGE UP (ref 0–41)
BASOPHILS # BLD AUTO: 0.02 K/UL — SIGNIFICANT CHANGE UP (ref 0–0.2)
BASOPHILS NFR BLD AUTO: 0.3 % — SIGNIFICANT CHANGE UP (ref 0–1)
BILIRUB SERPL-MCNC: 0.8 MG/DL — SIGNIFICANT CHANGE UP (ref 0.2–1.2)
BUN SERPL-MCNC: 13 MG/DL — SIGNIFICANT CHANGE UP (ref 10–20)
CALCIUM SERPL-MCNC: 8.4 MG/DL — LOW (ref 8.5–10.1)
CHLORIDE SERPL-SCNC: 110 MMOL/L — SIGNIFICANT CHANGE UP (ref 98–110)
CO2 SERPL-SCNC: 25 MMOL/L — SIGNIFICANT CHANGE UP (ref 17–32)
CREAT SERPL-MCNC: 0.5 MG/DL — LOW (ref 0.7–1.5)
CRP SERPL-MCNC: 13.9 MG/L — HIGH
CULTURE RESULTS: SIGNIFICANT CHANGE UP
EGFR: 101 ML/MIN/1.73M2 — SIGNIFICANT CHANGE UP
EOSINOPHIL # BLD AUTO: 0.05 K/UL — SIGNIFICANT CHANGE UP (ref 0–0.7)
EOSINOPHIL NFR BLD AUTO: 0.8 % — SIGNIFICANT CHANGE UP (ref 0–8)
ERYTHROCYTE [SEDIMENTATION RATE] IN BLOOD: 4 MM/HR — SIGNIFICANT CHANGE UP (ref 0–20)
GLUCOSE SERPL-MCNC: 101 MG/DL — HIGH (ref 70–99)
HCT VFR BLD CALC: 31.6 % — LOW (ref 37–47)
HCV AB S/CO SERPL IA: 0.04 COI — SIGNIFICANT CHANGE UP
HCV AB SERPL-IMP: SIGNIFICANT CHANGE UP
HGB BLD-MCNC: 10.9 G/DL — LOW (ref 12–16)
IMM GRANULOCYTES NFR BLD AUTO: 0.5 % — HIGH (ref 0.1–0.3)
LYMPHOCYTES # BLD AUTO: 1.51 K/UL — SIGNIFICANT CHANGE UP (ref 1.2–3.4)
LYMPHOCYTES # BLD AUTO: 23.8 % — SIGNIFICANT CHANGE UP (ref 20.5–51.1)
MAGNESIUM SERPL-MCNC: 2 MG/DL — SIGNIFICANT CHANGE UP (ref 1.8–2.4)
MCHC RBC-ENTMCNC: 28 PG — SIGNIFICANT CHANGE UP (ref 27–31)
MCHC RBC-ENTMCNC: 34.5 G/DL — SIGNIFICANT CHANGE UP (ref 32–37)
MCV RBC AUTO: 81.2 FL — SIGNIFICANT CHANGE UP (ref 81–99)
MONOCYTES # BLD AUTO: 0.56 K/UL — SIGNIFICANT CHANGE UP (ref 0.1–0.6)
MONOCYTES NFR BLD AUTO: 8.8 % — SIGNIFICANT CHANGE UP (ref 1.7–9.3)
NEUTROPHILS # BLD AUTO: 4.18 K/UL — SIGNIFICANT CHANGE UP (ref 1.4–6.5)
NEUTROPHILS NFR BLD AUTO: 65.8 % — SIGNIFICANT CHANGE UP (ref 42.2–75.2)
NRBC # BLD: 0 /100 WBCS — SIGNIFICANT CHANGE UP (ref 0–0)
PLATELET # BLD AUTO: 141 K/UL — SIGNIFICANT CHANGE UP (ref 130–400)
POTASSIUM SERPL-MCNC: 3.7 MMOL/L — SIGNIFICANT CHANGE UP (ref 3.5–5)
POTASSIUM SERPL-SCNC: 3.7 MMOL/L — SIGNIFICANT CHANGE UP (ref 3.5–5)
PROT SERPL-MCNC: 5 G/DL — LOW (ref 6–8)
RBC # BLD: 3.89 M/UL — LOW (ref 4.2–5.4)
RBC # FLD: 14.6 % — HIGH (ref 11.5–14.5)
SODIUM SERPL-SCNC: 143 MMOL/L — SIGNIFICANT CHANGE UP (ref 135–146)
SPECIMEN SOURCE: SIGNIFICANT CHANGE UP
WBC # BLD: 6.35 K/UL — SIGNIFICANT CHANGE UP (ref 4.8–10.8)
WBC # FLD AUTO: 6.35 K/UL — SIGNIFICANT CHANGE UP (ref 4.8–10.8)

## 2022-08-24 PROCEDURE — 99291 CRITICAL CARE FIRST HOUR: CPT

## 2022-08-24 PROCEDURE — 71045 X-RAY EXAM CHEST 1 VIEW: CPT | Mod: 26

## 2022-08-24 PROCEDURE — 99222 1ST HOSP IP/OBS MODERATE 55: CPT

## 2022-08-24 PROCEDURE — 93010 ELECTROCARDIOGRAM REPORT: CPT

## 2022-08-24 RX ORDER — HYDRALAZINE HCL 50 MG
5 TABLET ORAL ONCE
Refills: 0 | Status: COMPLETED | OUTPATIENT
Start: 2022-08-24 | End: 2022-08-24

## 2022-08-24 RX ORDER — NIFEDIPINE 30 MG
30 TABLET, EXTENDED RELEASE 24 HR ORAL DAILY
Refills: 0 | Status: DISCONTINUED | OUTPATIENT
Start: 2022-08-24 | End: 2022-08-25

## 2022-08-24 RX ADMIN — Medication 5 MILLIGRAM(S): at 19:06

## 2022-08-24 RX ADMIN — Medication 5 MILLIGRAM(S): at 18:11

## 2022-08-24 RX ADMIN — Medication 650 MILLIGRAM(S): at 06:30

## 2022-08-24 RX ADMIN — Medication 650 MILLIGRAM(S): at 05:04

## 2022-08-24 RX ADMIN — Medication 116 MILLIGRAM(S): at 17:35

## 2022-08-24 RX ADMIN — CHLORHEXIDINE GLUCONATE 1 APPLICATION(S): 213 SOLUTION TOPICAL at 05:06

## 2022-08-24 RX ADMIN — Medication 30 MILLIGRAM(S): at 16:55

## 2022-08-24 NOTE — CONSULT NOTE ADULT - ASSESSMENT
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

## 2022-08-24 NOTE — PROGRESS NOTE ADULT - SUBJECTIVE AND OBJECTIVE BOX
Patient is a 67y old  Female who presents with a chief complaint of Hemoperitoneum (24 Aug 2022 10:18)        Over Night Events:    continues to have abdominal discomfort, epigastric tenderness  wants to get out of bed and move around    ROS:     All ROS are negative except HPI           PHYSICAL EXAM    ICU Vital Signs Last 24 Hrs  T(C): 36.4 (24 Aug 2022 08:00), Max: 36.7 (24 Aug 2022 04:00)  T(F): 97.5 (24 Aug 2022 08:00), Max: 98 (24 Aug 2022 04:00)  HR: 69 (24 Aug 2022 11:00) (55 - 76)  BP: 136/66 (24 Aug 2022 11:00) (117/59 - 168/77)  BP(mean): 95 (24 Aug 2022 11:00) (82 - 117)  ABP: 161/71 (24 Aug 2022 11:00) (95/78 - 177/69)  ABP(mean): 101 (24 Aug 2022 11:00) (88 - 139)  RR: 21 (24 Aug 2022 11:00) (13 - 26)  SpO2: 97% (24 Aug 2022 11:00) (96% - 99%)    O2 Parameters below as of 24 Aug 2022 11:00  Patient On (Oxygen Delivery Method): room air            Constitutional: no acute distress, well nourished well developed  Neuro: moving all 4 limbs spontaneously, no facial droop or dysarthria  HEENT: NCAT, anicteric  Neck: no visible lymphadenopathy or goiter  Pulm: no respiratory distress. clear to auscultation bilaterally  Cardiac: extremities appear pink and well-perfused.  regular rhythm and rate, no murmur detected  Abdomen: non-distended, epigastric tenderness, no visible ecchymoses  Extremities: no peripheral edema      22 @ 07:  -  22 @ 07:00  --------------------------------------------------------  IN:  Total IN: 0 mL    OUT:    Voided (mL): 1200 mL  Total OUT: 1200 mL    Total NET: -1200 mL      08-24-22 @ 07:01  -  22 @ 11:17  --------------------------------------------------------  IN:    Oral Fluid: 118 mL  Total IN: 118 mL    OUT:    Voided (mL): 550 mL  Total OUT: 550 mL    Total NET: -432 mL          LABS:                            10.9   6.35  )-----------( 141      ( 24 Aug 2022 04:35 )             31.6                                               08    143  |  110  |  13  ----------------------------<  101<H>  3.7   |  25  |  0.5<L>    Ca    8.4<L>      24 Aug 2022 04:35  Mg     2.0         TPro  5.0<L>  /  Alb  3.3<L>  /  TBili  0.8  /  DBili  x   /  AST  14  /  ALT  13  /  AlkPhos  49  08-24      PT/INR - ( 23 Aug 2022 07:50 )   PT: 12.50 sec;   INR: 1.09 ratio         PTT - ( 23 Aug 2022 07:50 )  PTT:27.7 sec                                       Urinalysis Basic - ( 23 Aug 2022 00:10 )    Color: Light Yellow / Appearance: Clear / S.019 / pH: x  Gluc: x / Ketone: Moderate  / Bili: Negative / Urobili: <2 mg/dL   Blood: x / Protein: Trace / Nitrite: Negative   Leuk Esterase: Negative / RBC: x / WBC x   Sq Epi: x / Non Sq Epi: x / Bacteria: x                                                  LIVER FUNCTIONS - ( 24 Aug 2022 04:35 )  Alb: 3.3 g/dL / Pro: 5.0 g/dL / ALK PHOS: 49 U/L / ALT: 13 U/L / AST: 14 U/L / GGT: x                                                  Culture - Urine (collected 23 Aug 2022 00:10)  Source: Clean Catch Clean Catch (Midstream)  Final Report (24 Aug 2022 10:17):    <10,000 CFU/mL Normal U[facility] [place]                                                                                           MEDICATIONS  (STANDING):  chlorhexidine 2% Cloths 1 Application(s) Topical <User Schedule>    MEDICATIONS  (PRN):  acetaminophen     Tablet .. 650 milliGRAM(s) Oral every 6 hours PRN Temp greater or equal to 38C (100.4F), Mild Pain (1 - 3)      New X-rays reviewed:   reviewed  images and read, no acute disease by my read    ECHO reviewed

## 2022-08-24 NOTE — CONSULT NOTE ADULT - SUBJECTIVE AND OBJECTIVE BOX
Patient is a 67y old  Female who presents with a chief complaint of Hemoperitoneum (24 Aug 2022 00:46)    HPI:  68yo F w/ no significant pmhx presented to ED with left flank pain that started around 9pm last night. Patient was relaxing/watching TV when she suddenly felt this and presented to the ED. Initially patient was hypertensive and there was an initial concern for possible kidney stone. However CT a/p showed hemoperitoneum with active extravesation of celiac artery. Patient denied any past trauma or falls. Denies being on any medications including blood thinners. Denies all other complaints except her left flank/back pain.     Patient then became hemodynamically unstable BP 79/40 and became dizzy and diaphoretic. Code fusion was called. Patient received 4 total prbcs and will get 1u of ffp and platelets. IR was called and are planning for emergent embolization. Vascular also on board.      labs: hgb 13.7 -> 12.3, coags wnl, lactate 2.5 -> 1.4  imaging:   - CT a/p IV con: Large volume dense ascites compatible with hemoperitoneum. Active contrast extravasation is seen in the subhepatic region, likely arising from a distal celiac branch vessel in the hepatic territory. A possible second smaller region of active extravasation is seen adjacent to a vessel in the region of the pancreatic tail (4-48, 4-43). A dual source of bleeding is not excluded, and a CTA may be considered for further evaluation.  - CT a/p angio: Mildly increased large volume hemoperitoneum with prominent active extravasation in the subhepatic region originating from a distal celiac branch in the hepatic territory. A second focus of extravasation is not seen on the current exam.   (23 Aug 2022 06:30)    Additional Information:    REVIEW OF SYSTEMS:  All Review of systems negative, except for those marked:  Constitutional	Normal: no fever, weight loss, fatigue, repeated infections, loss of apetite  .		[] Abnormal:  Eyes		Normal: no double or blurred vision, red eye, glaucoma, cataracts, photophobia,   .		eye pain  .		[] Comments/Additional Information:  ENT		Normal: no decreased hearing, discharge, stuffiness, change in voice, difficulty   .		swallowing, mouth sores  .		[] Abnormal:  Respiratory	Normal: no SOB, asthma, bronchitis, coughing, pain with breathing, TB  .		[] Abnormal:  Cardiovascular	Normal: no chest pain, palpitations, tachycardia, high blood pressure, abnormal   .		ECG  .		[] Abnormal:  GI		Normal: no food intolerance, diet change, jaundice, hepatitis, nausea, vomiting,   .		abdominal pain, diarrhea, blood in stool  .		[] Abnormal:  Genitourinary	Normal: no kidney failure, difficulty with urination, blood in urine, dysuria  .		[] Abnormal:  Integumentary	Normal: no rashes, psoriasis, moles, hair loss, Raynaud’s  .		[] Abnormal:  Psychiatric	Normal: no depression, psychosis, sleeping difficulties, confusion  .		[] Abnormal:  Endocrine	Normal: no thyroid disease, diabetes, hirsuitism, obesity  .		[] Abnormal:  Neurologic	Normal: no headaches, seizures, speech disturbances, cognitive changes,   .		clumsiness, numbness  .		[] Abnormal:  Hematologic/Lymph	Normal: no low HCT, blood transfusions, lymph node enlargement,   .			bleeding, bruising  .			[] Abnormal:  Musculoskeletal		Normal: no joint pain, cramps, weakness, myalgias  .			[] Abnormal:    MEDICATIONS  (STANDING):  chlorhexidine 2% Cloths 1 Application(s) Topical <User Schedule>    MEDICATIONS  (PRN):  acetaminophen     Tablet .. 650 milliGRAM(s) Oral every 6 hours PRN Temp greater or equal to 38C (100.4F), Mild Pain (1 - 3)    Allergies    No Known Allergies    Intolerances      PPD:  Vaccines:    PAST MEDICAL & SURGICAL HISTORY:  No pertinent past medical history      No significant past surgical history        Growth & Development:    FAMILY HISTORY:  [] Arthritis:  [] Lupus/Collagen Vascular:  [] Psoriasis:  [] Uveitis:  [] Thyroid Disease:  [] Ankylosing Spondylitis:  [] Lyme  [] IBD  [] Acute Rheumatic Fever  [] Diabetes    SOCIAL HISTORY:  School Performance/Attendance:  [] Animal/Insect Exposure:    Vital Signs Last 24 Hrs  T(C): 36.4 (24 Aug 2022 08:00), Max: 36.7 (24 Aug 2022 04:00)  T(F): 97.5 (24 Aug 2022 08:00), Max: 98 (24 Aug 2022 04:00)  HR: 62 (24 Aug 2022 09:00) (55 - 76)  BP: 146/72 (24 Aug 2022 09:00) (117/59 - 167/87)  BP(mean): 103 (24 Aug 2022 09:00) (82 - 117)  RR: 18 (24 Aug 2022 09:00) (13 - 26)  SpO2: 98% (24 Aug 2022 09:00) (96% - 98%)    Parameters below as of 24 Aug 2022 09:00  Patient On (Oxygen Delivery Method): room air      Daily     Daily     PHYSICAL EXAM:  All physical exam findings normal, except for those marked:  General Appearance:  Skin 		WNL: no rash, lesion, ulcers, indurations, nodules or tightening, normal nail bed   .		capillaries  .		[] Abnormal:  Eyes		WNL: normal conjunctiva and lids, normal pupils and iris  .		[] Abnormal:  ENT		WNL: normal appearance of ears, nose lips, teeth, gums, oropharynx, oral   .		mucosal and palate  .		[] Abnormal:  Neck: 		WNL: no masses, normal thyroid  .		[] Abnormal:  Cardiovascular: WNL: normal auscultation, normal peripheral pulses, no peripheral edema  .		[] Abnormal:  Respiratory: 	WNL: normal respiratory effort  .		[] Abnormal:  GI:		WNL: no masses or tenderness, normal liver and spleen  .		[] Abnormal:  Lymphatic: 	WNL: normal cervical, axillary and inguinal nodes  .		[] Abnormal:  Neurologic: 	WNL: normal DTR’s, normal sensation  .		[] Abnormal:  Psychiatric: 	WNL: normal judgment and insight, normal memory, normal mood and affect  .		[] Abnormal:  Genitalia: 	WNL: normal breasts, genitals and pubic hair  .		[] Abnormal:  Musculoskeletal:	WNL: normal digits, normal muscle strength, full ROM, normal gait  .			[] Abnormal/see Joint exam below  .			[] Leg Lengths:  .			[] Muscle Atrophy:  .			[] Global Assessment of Disease Activity (1-10):    Joint:  [] Warmth	[] Pain/Motion	[] Less ROM	[] Effusion	[] Tender	[] Swelling  Joint :  [] Warmth	[] Pain/Motion	[] Less ROM	[] Effusion	[] Tender	[] Swelling  Joint :  [] Warmth	[] Pain/Motion	[] Less ROM	[] Effusion	[] Tender	[] Swelling  Joint :  [] Warmth	[] Pain/Motion	[] Less ROM	[] Effusion	[] Tender	[] Swelling    Lab Results:                        10.9   6.35  )-----------( 141      ( 24 Aug 2022 04:35 )             31.6     08-24    143  |  110  |  13  ----------------------------<  101<H>  3.7   |  25  |  0.5<L>    Ca    8.4<L>      24 Aug 2022 04:35  Mg     2.0     08-24    TPro  5.0<L>  /  Alb  3.3<L>  /  TBili  0.8  /  DBili  x   /  AST  14  /  ALT  13  /  AlkPhos  49  08-24    PT/INR - ( 23 Aug 2022 07:50 )   PT: 12.50 sec;   INR: 1.09 ratio         PTT - ( 23 Aug 2022 07:50 )  PTT:27.7 sec  Urinalysis Basic - ( 23 Aug 2022 00:10 )    Color: Light Yellow / Appearance: Clear / S.019 / pH: x  Gluc: x / Ketone: Moderate  / Bili: Negative / Urobili: <2 mg/dL   Blood: x / Protein: Trace / Nitrite: Negative   Leuk Esterase: Negative / RBC: x / WBC x   Sq Epi: x / Non Sq Epi: x / Bacteria: x     Patient is a 67y old  Female who presents with a chief complaint of Hemoperitoneum    HPI:  68yo F w/ no significant pmhx presented to ED with left flank pain that started around 9pm last night. Patient was relaxing/watching TV when she suddenly felt this and presented to the ED. Initially patient was hypertensive and there was an initial concern for possible kidney stone. However CT a/p showed hemoperitoneum with active extravesation of celiac artery. Patient denied any past trauma or falls. Denies being on any medications including blood thinners. Denies all other complaints except her left flank/back pain.     Patient then became hemodynamically unstable BP 79/40 and became dizzy and diaphoretic. Code fusion was called. Patient received 4 total prbcs and will get 1u of ffp and platelets. IR was called and are planning for emergent embolization. Vascular also on board.      labs: hgb 13.7 -> 12.3, coags wnl, lactate 2.5 -> 1.4  imaging:   - CT a/p IV con: Large volume dense ascites compatible with hemoperitoneum. Active contrast extravasation is seen in the subhepatic region, likely arising from a distal celiac branch vessel in the hepatic territory. A possible second smaller region of active extravasation is seen adjacent to a vessel in the region of the pancreatic tail (4-48, 4-43). A dual source of bleeding is not excluded, and a CTA may be considered for further evaluation.  - CT a/p angio: Mildly increased large volume hemoperitoneum with prominent active extravasation in the subhepatic region originating from a distal celiac branch in the hepatic territory. A second focus of extravasation is not seen on the current exam.   (23 Aug 2022 06:30)    Additional Information:    REVIEW OF SYSTEMS:  All Review of systems negative, except for those marked:  Constitutional	Normal: no fever, weight loss, fatigue, repeated infections, loss of apetite  .		[] Abnormal:  Eyes		Normal: no double or blurred vision, red eye, glaucoma, cataracts, photophobia,   .		eye pain  .		[] Comments/Additional Information:  ENT		Normal: no decreased hearing, discharge, stuffiness, change in voice, difficulty   .		swallowing, mouth sores  .		[] Abnormal:  Respiratory	Normal: no SOB, asthma, bronchitis, coughing, pain with breathing  .		[] Abnormal:  Cardiovascular	Normal: no chest pain, palpitations, tachycardia, high blood pressure, abnormal   .		ECG  .		[] Abnormal:  GI		Normal: no food intolerance, diet change, jaundice, hepatitis, nausea, vomiting,   .		abdominal pain, diarrhea, blood in stool  .		[] Abnormal:  Genitourinary	Normal: no kidney failure, difficulty with urination, blood in urine, dysuria  .		[] Abnormal:  Integumentary	Normal: no rashes, psoriasis, moles, hair loss, Raynaud’s  .		[] Abnormal:  Psychiatric	Normal: no depression, psychosis, sleeping difficulties, confusion  .		[] Abnormal:  Endocrine	Normal: no thyroid disease, diabetes, hirsuitism, obesity  .		[] Abnormal:  Neurologic	Normal: no headaches, seizures, speech disturbances, cognitive changes,   .		clumsiness, numbness  .		[] Abnormal:  Hematologic/Lymph	Normal: no low HCT, blood transfusions, lymph node enlargement,   .			bleeding, bruising  .			[] Abnormal:  Musculoskeletal		Normal: no joint pain, cramps, weakness, myalgias  .			[] Abnormal:    MEDICATIONS  (STANDING):  chlorhexidine 2% Cloths 1 Application(s) Topical <User Schedule>    MEDICATIONS  (PRN):  acetaminophen     Tablet .. 650 milliGRAM(s) Oral every 6 hours PRN Temp greater or equal to 38C (100.4F), Mild Pain (1 - 3)    Allergies    No Known Allergies    Intolerances      PPD:  Vaccines:    PAST MEDICAL & SURGICAL HISTORY:  No pertinent past medical history      No significant past surgical history        Growth & Development:    FAMILY HISTORY:  [] Arthritis:  [] Lupus/Collagen Vascular:  [] Psoriasis:  [] Uveitis:  [] Thyroid Disease:  [] Ankylosing Spondylitis:  [] Lyme  [] IBD  [] Acute Rheumatic Fever  [] Diabetes    SOCIAL HISTORY:  School Performance/Attendance:  [] Animal/Insect Exposure:    Vital Signs Last 24 Hrs  T(C): 36.4 (24 Aug 2022 08:00), Max: 36.7 (24 Aug 2022 04:00)  T(F): 97.5 (24 Aug 2022 08:00), Max: 98 (24 Aug 2022 04:00)  HR: 62 (24 Aug 2022 09:00) (55 - 76)  BP: 146/72 (24 Aug 2022 09:00) (117/59 - 167/87)  BP(mean): 103 (24 Aug 2022 09:00) (82 - 117)  RR: 18 (24 Aug 2022 09:00) (13 - 26)  SpO2: 98% (24 Aug 2022 09:00) (96% - 98%)    Parameters below as of 24 Aug 2022 09:00  Patient On (Oxygen Delivery Method): room air      Daily     Daily     PHYSICAL EXAM:  All physical exam findings normal, except for those marked:  General Appearance:  Skin 		WNL: no rash, lesion, ulcers, indurations, nodules or tightening, normal nail bed   .		capillaries  .		[] Abnormal:  Eyes		WNL: normal conjunctiva and lids, normal pupils and iris  .		[] Abnormal:  ENT		WNL: normal appearance of ears, nose lips, teeth, gums, oropharynx, oral   .		mucosal and palate  .		[] Abnormal:  Neck: 		WNL: no masses, normal thyroid  .		[] Abnormal:  Cardiovascular: WNL: normal auscultation, normal peripheral pulses, no peripheral edema  .		[] Abnormal:  Respiratory: 	WNL: normal respiratory effort  .		[] Abnormal:  GI:		WNL: no masses or tenderness, normal liver and spleen  .		[] Abnormal:  Lymphatic: 	WNL: normal cervical, axillary and inguinal nodes  .		[] Abnormal:  Neurologic: 	WNL: normal DTR’s, normal sensation  .		[] Abnormal:  Psychiatric: 	WNL: normal judgment and insight, normal memory, normal mood and affect  .		[] Abnormal:  Genitalia: 	WNL: normal breasts, genitals and pubic hair  .		[] Abnormal:  Musculoskeletal:	WNL: normal digits, normal muscle strength, full ROM, normal gait  .			[] Abnormal/see Joint exam below  .			[] Leg Lengths:  .			[] Muscle Atrophy:  .			[] Global Assessment of Disease Activity (1-10):    Joint:  [] Warmth	[] Pain/Motion	[] Less ROM	[] Effusion	[] Tender	[] Swelling  Joint :  [] Warmth	[] Pain/Motion	[] Less ROM	[] Effusion	[] Tender	[] Swelling  Joint :  [] Warmth	[] Pain/Motion	[] Less ROM	[] Effusion	[] Tender	[] Swelling  Joint :  [] Warmth	[] Pain/Motion	[] Less ROM	[] Effusion	[] Tender	[] Swelling    Lab Results:                        10.9   6.35  )-----------( 141      ( 24 Aug 2022 04:35 )             31.6     08-24    143  |  110  |  13  ----------------------------<  101<H>  3.7   |  25  |  0.5<L>    Ca    8.4<L>      24 Aug 2022 04:35  Mg     2.0     08-24    TPro  5.0<L>  /  Alb  3.3<L>  /  TBili  0.8  /  DBili  x   /  AST  14  /  ALT  13  /  AlkPhos  49  08-24    PT/INR - ( 23 Aug 2022 07:50 )   PT: 12.50 sec;   INR: 1.09 ratio         PTT - ( 23 Aug 2022 07:50 )  PTT:27.7 sec  Urinalysis Basic - ( 23 Aug 2022 00:10 )    Color: Light Yellow / Appearance: Clear / S.019 / pH: x  Gluc: x / Ketone: Moderate  / Bili: Negative / Urobili: <2 mg/dL   Blood: x / Protein: Trace / Nitrite: Negative   Leuk Esterase: Negative / RBC: x / WBC x   Sq Epi: x / Non Sq Epi: x / Bacteria: x

## 2022-08-24 NOTE — PROGRESS NOTE ADULT - CRITICAL CARE ATTENDING COMMENT
This patient is critically ill due to the following:  * Multiple organ failure requiring complex decision-making, and there is a high probability of imminent or life-threatening deterioration in the patient’s condition  * The patient required frequent reassessments and monitoring to ensure response to interventions and therapies.    Critical care time includes time spent evaluating and treating the patient's acute illness as well as time spent reviewing labs, radiology,  and discussing the case with a multidisciplinary team in an effort to prevent further life threatening deterioration or end organ damage. This time is independent of any procedures performed.

## 2022-08-24 NOTE — PROGRESS NOTE ADULT - SUBJECTIVE AND OBJECTIVE BOX
GENERAL SURGERY PROGRESS NOTE    Patient: KEN ROBERSON , 67y (51)Female   MRN: [id number]  Location: [facility] [address]  Visit: 22 Inpatient  Date: 22 @ 00:46    Hospital Day #: 2    Events of past 24 hours:  -patient underwent coil embolization of R gastric artery with IR  -blood pressure and hgb stable    PAST MEDICAL & SURGICAL HISTORY:  No pertinent past medical history    No significant past surgical history    Vitals:   T(F): 97.8 (22 @ 20:00), Max: 98.3 (22 @ 04:35)  HR: 62 (22 @ 23:00)  BP: 142/67 (22 @ 23:00)  RR: 16 (22 @ 23:00)  SpO2: 97% (22 @ 23:00)    Diet, NPO:   Except Medication    PHYSICAL EXAM:  General: NAD, AAOx3  Cardiac: RRR  Respiratory: Unlabored breathing at rest  Abdomen: soft, non-distended, tender to light palpation in all quadrants, +guarding, +rebound, peritonitic.  Musculoskeletal: Strength 5/5 BL UE/LE, ROM intact, compartments soft  Neuro: Sensation grossly intact and equal throughout, no focal deficits  Skin: Warm/dry, normal color, no jaundice    MEDICATIONS  (STANDING):  chlorhexidine 2% Cloths 1 Application(s) Topical <User Schedule>    MEDICATIONS  (PRN):  acetaminophen     Tablet .. 650 milliGRAM(s) Oral every 6 hours PRN Temp greater or equal to 38C (100.4F), Mild Pain (1 - 3)      DVT PROPHYLAXIS:   GI PROPHYLAXIS:   ANTICOAGULATION:   ANTIBIOTICS:      LAB/STUDIES:  Labs:  CAPILLARY BLOOD GLUCOSE               11.6   7.29  )-----------( 149      ( 23 Aug 2022 21:00 )             33.3       Auto Neutrophil %: 76.8 % (22 @ 21:00)  Auto Immature Granulocyte %: 0.4 % (22 @ 21:00)  Auto Neutrophil %: 90.1 % (22 @ 07:50)  Auto Immature Granulocyte %: 0.4 % (22 @ 07:50)  Auto Neutrophil %: 85.6 % (22 @ 04:40)  Auto Immature Granulocyte %: 0.5 % (22 @ 04:40)        141  |  110  |  15  ----------------------------<  220<H>  4.5   |  20  |  0.5<L>    Calcium, Total Serum: 8.5 mg/dL (22 @ 07:50)    LFTs:             5.3  | 1.5  | 21       ------------------[60      ( 23 Aug 2022 07:50 )  3.5  | x    | 18          Lipase:x      Amylase:x         Lactate, Blood: 1.4 mmol/L (22 @ 04:40)  Lactate, Blood: 2.5 mmol/L (22 @ 00:10)    Coags:     12.50  ----< 1.09    ( 23 Aug 2022 07:50 )     27.7      Urinalysis Basic - ( 23 Aug 2022 00:10 )    Color: Light Yellow / Appearance: Clear / S.019 / pH: x  Gluc: x / Ketone: Moderate  / Bili: Negative / Urobili: <2 mg/dL   Blood: x / Protein: Trace / Nitrite: Negative   Leuk Esterase: Negative / RBC: x / WBC x   Sq Epi: x / Non Sq Epi: x / Bacteria: x

## 2022-08-24 NOTE — PROGRESS NOTE ADULT - ASSESSMENT
KEN ROBERSON is a 67y woman without a significant medical history who presented initially with abdominal and flank pain, and is now in the critical care unit for spontaneous hemoperitoneum.  She underwent coil placement with IR and is now being monitored post-procedurally.     Impression    Spontaneous non-traumtic hemoperitoneum  Celiac Artery Rupture  SMA pseudoaneurysms  Hemorrhagic Shock (resolved)    Plan:      CNS: AAO, no concerns  CARDIOVASCULAR  Hemorrhagic shock resolved s/p MTP  Monitor for further bleeding    PULMONARY  HOB @ 45 degrees, aspiration precautions  RA    GASTROINTESTINAL  GI prophylaxis: not indicated  Feeds: diet  BM: increase bowel regimen    GENITOURINARY/RENAL  daily chem    INFECTIOUS  no acute concern    HEMATOLOGIC  DVT ppx: hold pharmo-ppx  rheumatology panel given vascular abnormalities  consult rheumatology for further workup    ENDOCRINE  Follow up FS.  Insulin protocol as needed.  BG goal 140-180    MSK/DERM  PT/OT, OOB today unless contra-indicated per surgery or IR      CODE STATUS:  DISPO: remain in ICU, likely ready for SDU later today

## 2022-08-24 NOTE — PROGRESS NOTE ADULT - ASSESSMENT
67F w/ no PMH/PSH who presents with acute onset abdominal pain that started 8/22/22 around 8pm, progressively worsened overnight prompting visit to the ED, found to be hypotensive in ED and with large volume hemoperitoneum seen on CT, CTA performed showing active extravasation likely stemming from a downstream branch of the celiac axis.  General surgery and Vascular surgery teams were consulted and following.  MTP called and patient ultimately received 5uRBC, 1FFP, 1PLT.  Throughout hospital course patient remained AAOx3 and saturating well on RA, R radial Mariely and RIJ TLC placed in ED and BP responded to transfusions with MAPs persistently above 65, no pressors required.  Patient underwent emergent IR embolization of right gastric artery which was demonstrated to have active bleeding on angio.  Intraprocedural findings were significant for multiple aneurysms visualized throughout most small to medium sized vessels, including intrahepatic and splenic vessels, suspicious for underlying vasculitis.  Source of bleeding suspected to be from rupture of one of these aneurysms.  Post-procedure patient admitted to CCU unit.    Plan:   -continue ICU level of care for hemodynamic monitoring   -Workup for suspected vasculitides   -Surgery to follow   -Strict I/O  - trend hgb, monitor vitals    [phone]

## 2022-08-24 NOTE — PROGRESS NOTE ADULT - ASSESSMENT
Impression    Spontaneous non-traumtic hemoperitoneum  Celiac Artery Rupture  SMA pseudoaneurysms  Hemorrhagic Shock (resolved)    Plan:      CNS: AAO, no concerns  CARDIOVASCULAR  Hemorrhagic shock resolved s/p MTP  Monitor for further bleeding    PULMONARY  HOB @ 45 degrees, aspiration precautions  RA    GASTROINTESTINAL  GI prophylaxis: not indicated  Feeds: diet  BM: increase bowel regimen    GENITOURINARY/RENAL  daily chem    INFECTIOUS  no acute concern    HEMATOLOGIC  DVT ppx: hold pharmo-ppx  rheumatology panel given vascular abnormalities  consult rheumatology for further workup    ENDOCRINE  Follow up FS.  Insulin protocol as needed.  BG goal 140-180    MSK/DERM  PT/OT, OOB today unless contra-indicated per surgery or IR      CODE STATUS:  DISPO: remain in ICU, likely ready for SDU later today

## 2022-08-24 NOTE — PROGRESS NOTE ADULT - SUBJECTIVE AND OBJECTIVE BOX
Patient is a 67y old  Female who presents with a chief complaint of Hemoperitoneum (24 Aug 2022 11:13)    HPI:  68yo F w/ no significant pmhx presented to ED with left flank pain that started around 9pm last night. Patient was relaxing/watching TV when she suddenly felt this and presented to the ED. Initially patient was hypertensive and there was an initial concern for possible kidney stone. However CT a/p showed hemoperitoneum with active extravesation of celiac artery. Patient denied any past trauma or falls. Denies being on any medications including blood thinners. Denies all other complaints except her left flank/back pain.     Patient then became hemodynamically unstable BP 79/40 and became dizzy and diaphoretic. Code fusion was called. Patient received 4 total prbcs and will get 1u of ffp and platelets. IR was called and are planning for emergent embolization. Vascular also on board.      labs: hgb 13.7 -> 12.3, coags wnl, lactate 2.5 -> 1.4  imaging:   - CT a/p IV con: Large volume dense ascites compatible with hemoperitoneum. Active contrast extravasation is seen in the subhepatic region, likely arising from a distal celiac branch vessel in the hepatic territory. A possible second smaller region of active extravasation is seen adjacent to a vessel in the region of the pancreatic tail (4-48, 4-43). A dual source of bleeding is not excluded, and a CTA may be considered for further evaluation.  - CT a/p angio: Mildly increased large volume hemoperitoneum with prominent active extravasation in the subhepatic region originating from a distal celiac branch in the hepatic territory. A second focus of extravasation is not seen on the current exam.   (23 Aug 2022 06:30)       INTERVAL HPI/OVERNIGHT EVENTS:   No overnight events   Afebrile, hemodynamically stable     Subjective:    ICU Vital Signs Last 24 Hrs  T(C): 36.4 (24 Aug 2022 12:00), Max: 36.7 (24 Aug 2022 04:00)  T(F): 97.6 (24 Aug 2022 12:00), Max: 98 (24 Aug 2022 04:00)  HR: 61 (24 Aug 2022 12:00) (55 - 76)  BP: 125/60 (24 Aug 2022 12:00) (117/59 - 168/77)  BP(mean): 87 (24 Aug 2022 12:00) (82 - 117)  ABP: 111/99 (24 Aug 2022 12:00) (95/78 - 177/69)  ABP(mean): 105 (24 Aug 2022 12:00) (88 - 139)  RR: 15 (24 Aug 2022 12:00) (13 - 26)  SpO2: 97% (24 Aug 2022 12:00) (96% - 99%)    O2 Parameters below as of 24 Aug 2022 12:00  Patient On (Oxygen Delivery Method): room air          I&O's Summary    23 Aug 2022 07:01  -  24 Aug 2022 07:00  --------------------------------------------------------  IN: 0 mL / OUT: 1200 mL / NET: -1200 mL    24 Aug 2022 07:01  -  24 Aug 2022 13:27  --------------------------------------------------------  IN: 118 mL / OUT: 550 mL / NET: -432 mL          Daily     Daily     Adult Advanced Hemodynamics Last 24 Hrs  CVP(mm Hg): --  CVP(cm H2O): --  CO: --  CI: --  PA: --  PA(mean): --  PCWP: --  SVR: --  SVRI: --  PVR: --  PVRI: --    EKG/Telemetry Events:    MEDICATIONS  (STANDING):  chlorhexidine 2% Cloths 1 Application(s) Topical <User Schedule>    MEDICATIONS  (PRN):  acetaminophen     Tablet .. 650 milliGRAM(s) Oral every 6 hours PRN Temp greater or equal to 38C (100.4F), Mild Pain (1 - 3)      PHYSICAL EXAM:  GENERAL: laying in bed comfortably  HEAD:  Atraumatic, Normocephalic  EYES: conjunctiva and sclera clear  NECK: Supple  NERVOUS SYSTEM:  Alert & Awake Oriented x3  CHEST/LUNG: B/L good air entry; No rales, rhonchi, or wheezing  HEART: Regular rate and rhythm  ABDOMEN: Soft, tender, Nondistended  EXTREMITIES:  No clubbing, cyanosis, or edema  SKIN: No rashes or lesions    LABS:                        10.9   6.35  )-----------( 141      ( 24 Aug 2022 04:35 )             31.6     08-24    143  |  110  |  13  ----------------------------<  101<H>  3.7   |  25  |  0.5<L>    Ca    8.4<L>      24 Aug 2022 04:35  Mg     2.0         TPro  5.0<L>  /  Alb  3.3<L>  /  TBili  0.8  /  DBili  x   /  AST  14  /  ALT  13  /  AlkPhos  49  24    LIVER FUNCTIONS - ( 24 Aug 2022 04:35 )  Alb: 3.3 g/dL / Pro: 5.0 g/dL / ALK PHOS: 49 U/L / ALT: 13 U/L / AST: 14 U/L / GGT: x           PT/INR - ( 23 Aug 2022 07:50 )   PT: 12.50 sec;   INR: 1.09 ratio         PTT - ( 23 Aug 2022 07:50 )  PTT:27.7 sec  CAPILLARY BLOOD GLUCOSE                Urinalysis Basic - ( 23 Aug 2022 00:10 )    Color: Light Yellow / Appearance: Clear / S.019 / pH: x  Gluc: x / Ketone: Moderate  / Bili: Negative / Urobili: <2 mg/dL   Blood: x / Protein: Trace / Nitrite: Negative   Leuk Esterase: Negative / RBC: x / WBC x   Sq Epi: x / Non Sq Epi: x / Bacteria: x          RADIOLOGY & ADDITIONAL TESTS:  CXR:        Care Discussed with Consultants/Other Providers [ x] YES  [ ] NO

## 2022-08-24 NOTE — CONSULT NOTE ADULT - ATTENDING COMMENTS
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 Pt has no significant past medical history and was feeling well until she developed sudden-onset severe L flank pain the night before her current admission. 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 Pt says she has been feeling well before the onset of acute L flank pain, and denies any h/o similar symptoms. She is currently feeling well and denies abdominal pain. She denies rashes, diarrhea, hematochezia, hematuria, sinus abnormalities, joint pain, swelling anywhere, or eye or ear problems. Given pt's imaging findings with concern for vasculitis with gastrointestinal involvement, pt would benefit from aggressive immunosuppressive treatment, as GI vasculitis usually confers a worse prognosis, especially in patients over 65 years of age. The differential diagnosis for pt's symptoms includes IgA vasculitis, ANCA vasculitis, polyarteritis nodosa, cryoglobulinemia, and also vasculitis secondary to systemic connective tissue diseases such as RA and SLE. Pt does not have any other symptoms or physical exam findings to point to any one of these diagnoses versus others.  - Please start Solumedrol 1000 mg IV q day x 3 days, then transition to 60 mg PO q day  - Please start Bactrim 1 SS tablet daily for PCP prophylaxis  - Please send hepatitis B core antibody and surface antigen, cryoglobulin, quantiferon, SPEP, HIV, Sjogrens Ro and La, rheumatoid factor, cyclic citrullinated peptide, quantitative IgA  - f/u the following tests which were already sent: RUBEN, complements C3 and C4, ESR, CRP, ANCA levels  - Please obtain CT angiogram of the chest to further evaluate the extent of pt's vascular involvement  - Plan for possible rituximab depending on the results of the above bloodwork

## 2022-08-25 LAB
ALBUMIN SERPL ELPH-MCNC: 3.6 G/DL — SIGNIFICANT CHANGE UP (ref 3.5–5.2)
ALP SERPL-CCNC: 59 U/L — SIGNIFICANT CHANGE UP (ref 30–115)
ALT FLD-CCNC: 13 U/L — SIGNIFICANT CHANGE UP (ref 0–41)
ANA TITR SER: NEGATIVE — SIGNIFICANT CHANGE UP
ANION GAP SERPL CALC-SCNC: 10 MMOL/L — SIGNIFICANT CHANGE UP (ref 7–14)
AST SERPL-CCNC: 13 U/L — SIGNIFICANT CHANGE UP (ref 0–41)
AUTO DIFF PNL BLD: ABNORMAL
BILIRUB SERPL-MCNC: 0.6 MG/DL — SIGNIFICANT CHANGE UP (ref 0.2–1.2)
BUN SERPL-MCNC: 11 MG/DL — SIGNIFICANT CHANGE UP (ref 10–20)
C-ANCA SER-ACNC: NEGATIVE — SIGNIFICANT CHANGE UP
C3 SERPL-MCNC: 77 MG/DL — LOW (ref 81–157)
C4 SERPL-MCNC: 21 MG/DL — SIGNIFICANT CHANGE UP (ref 13–39)
CALCIUM SERPL-MCNC: 8.9 MG/DL — SIGNIFICANT CHANGE UP (ref 8.5–10.1)
CHLORIDE SERPL-SCNC: 107 MMOL/L — SIGNIFICANT CHANGE UP (ref 98–110)
CO2 SERPL-SCNC: 26 MMOL/L — SIGNIFICANT CHANGE UP (ref 17–32)
CREAT SERPL-MCNC: <0.5 MG/DL — LOW (ref 0.7–1.5)
DRVVT SCREEN TO CONFIRM RATIO: SIGNIFICANT CHANGE UP
DSDNA AB SER-ACNC: <12 IU/ML — SIGNIFICANT CHANGE UP
EGFR: 107 ML/MIN/1.73M2 — SIGNIFICANT CHANGE UP
GLUCOSE SERPL-MCNC: 158 MG/DL — HIGH (ref 70–99)
HAV IGM SER-ACNC: SIGNIFICANT CHANGE UP
HBV CORE IGM SER-ACNC: SIGNIFICANT CHANGE UP
HBV SURFACE AG SER-ACNC: SIGNIFICANT CHANGE UP
HCT VFR BLD CALC: 31.5 % — LOW (ref 37–47)
HCV AB S/CO SERPL IA: 0.09 S/CO — SIGNIFICANT CHANGE UP (ref 0–0.99)
HCV AB SERPL-IMP: SIGNIFICANT CHANGE UP
HGB BLD-MCNC: 11.4 G/DL — LOW (ref 12–16)
HIV 1+2 AB+HIV1 P24 AG SERPL QL IA: SIGNIFICANT CHANGE UP
IGA FLD-MCNC: 90 MG/DL — SIGNIFICANT CHANGE UP (ref 84–499)
IGG FLD-MCNC: 800 MG/DL — SIGNIFICANT CHANGE UP (ref 610–1660)
IGM SERPL-MCNC: 88 MG/DL — SIGNIFICANT CHANGE UP (ref 35–242)
KAPPA LC SER QL IFE: 1.37 MG/DL — SIGNIFICANT CHANGE UP (ref 0.33–1.94)
KAPPA/LAMBDA FREE LIGHT CHAIN RATIO, SERUM: 1.25 RATIO — SIGNIFICANT CHANGE UP (ref 0.26–1.65)
LA NT DPL PPP QL: SIGNIFICANT CHANGE UP
LAMBDA LC SER QL IFE: 1.1 MG/DL — SIGNIFICANT CHANGE UP (ref 0.57–2.63)
MAGNESIUM SERPL-MCNC: 1.7 MG/DL — LOW (ref 1.8–2.4)
MCHC RBC-ENTMCNC: 29.2 PG — SIGNIFICANT CHANGE UP (ref 27–31)
MCHC RBC-ENTMCNC: 36.2 G/DL — SIGNIFICANT CHANGE UP (ref 32–37)
MCV RBC AUTO: 80.8 FL — LOW (ref 81–99)
MPO AB + PR3 PNL SER: SIGNIFICANT CHANGE UP
NORMALIZED SCT PPP-RTO: 0.56 RATIO — SIGNIFICANT CHANGE UP (ref 0–1.16)
NORMALIZED SCT PPP-RTO: SIGNIFICANT CHANGE UP
NRBC # BLD: 0 /100 WBCS — SIGNIFICANT CHANGE UP (ref 0–0)
P-ANCA SER-ACNC: NEGATIVE — SIGNIFICANT CHANGE UP
PLATELET # BLD AUTO: 136 K/UL — SIGNIFICANT CHANGE UP (ref 130–400)
POTASSIUM SERPL-MCNC: 3.6 MMOL/L — SIGNIFICANT CHANGE UP (ref 3.5–5)
POTASSIUM SERPL-SCNC: 3.6 MMOL/L — SIGNIFICANT CHANGE UP (ref 3.5–5)
PROT SERPL-MCNC: 5.4 G/DL — LOW (ref 6–8)
RBC # BLD: 3.9 M/UL — LOW (ref 4.2–5.4)
RBC # FLD: 14.4 % — SIGNIFICANT CHANGE UP (ref 11.5–14.5)
RHEUMATOID FACT SERPL-ACNC: <10 IU/ML — SIGNIFICANT CHANGE UP (ref 0–13)
SODIUM SERPL-SCNC: 143 MMOL/L — SIGNIFICANT CHANGE UP (ref 135–146)
WBC # BLD: 4.6 K/UL — LOW (ref 4.8–10.8)
WBC # FLD AUTO: 4.6 K/UL — LOW (ref 4.8–10.8)

## 2022-08-25 PROCEDURE — 70498 CT ANGIOGRAPHY NECK: CPT | Mod: 26

## 2022-08-25 PROCEDURE — 99233 SBSQ HOSP IP/OBS HIGH 50: CPT | Mod: GC

## 2022-08-25 PROCEDURE — 71260 CT THORAX DX C+: CPT | Mod: 26

## 2022-08-25 PROCEDURE — 70496 CT ANGIOGRAPHY HEAD: CPT | Mod: 26

## 2022-08-25 RX ORDER — POTASSIUM CHLORIDE 20 MEQ
40 PACKET (EA) ORAL ONCE
Refills: 0 | Status: COMPLETED | OUTPATIENT
Start: 2022-08-25 | End: 2022-08-25

## 2022-08-25 RX ORDER — NIFEDIPINE 30 MG
60 TABLET, EXTENDED RELEASE 24 HR ORAL DAILY
Refills: 0 | Status: DISCONTINUED | OUTPATIENT
Start: 2022-08-25 | End: 2022-08-27

## 2022-08-25 RX ORDER — MAGNESIUM SULFATE 500 MG/ML
2 VIAL (ML) INJECTION ONCE
Refills: 0 | Status: COMPLETED | OUTPATIENT
Start: 2022-08-25 | End: 2022-08-25

## 2022-08-25 RX ORDER — POLYETHYLENE GLYCOL 3350 17 G/17G
17 POWDER, FOR SOLUTION ORAL DAILY
Refills: 0 | Status: DISCONTINUED | OUTPATIENT
Start: 2022-08-25 | End: 2022-09-01

## 2022-08-25 RX ORDER — LACTULOSE 10 G/15ML
20 SOLUTION ORAL
Refills: 0 | Status: COMPLETED | OUTPATIENT
Start: 2022-08-25 | End: 2022-08-25

## 2022-08-25 RX ADMIN — CHLORHEXIDINE GLUCONATE 1 APPLICATION(S): 213 SOLUTION TOPICAL at 05:33

## 2022-08-25 RX ADMIN — Medication 116 MILLIGRAM(S): at 06:06

## 2022-08-25 RX ADMIN — POLYETHYLENE GLYCOL 3350 17 GRAM(S): 17 POWDER, FOR SOLUTION ORAL at 18:52

## 2022-08-25 RX ADMIN — Medication 25 GRAM(S): at 10:29

## 2022-08-25 RX ADMIN — Medication 40 MILLIEQUIVALENT(S): at 10:29

## 2022-08-25 RX ADMIN — Medication 30 MILLIGRAM(S): at 05:29

## 2022-08-25 NOTE — PROGRESS NOTE ADULT - ASSESSMENT
KEN ROBERSON is a 67y woman without a significant medical history who presented initially with abdominal and flank pain, and is now in the critical care unit for spontaneous hemoperitoneum.  She underwent coil placement with IR and is now being monitored post-procedurally.     Impression    Spontaneous non-traumtic hemoperitoneum  Celiac Artery Rupture  SMA pseudoaneurysms  Hemorrhagic Shock (resolved)    Plan:      CNS: AAO, no concerns  CARDIOVASCULAR  Hemorrhagic shock resolved s/p MTP  Monitor for further bleeding  Increase nifedipine for HTN    PULMONARY  HOB @ 45 degrees, aspiration precautions  RA    GASTROINTESTINAL  GI prophylaxis: not indicated  Feeds: diet  BM: increase bowel regimen    GENITOURINARY/RENAL  daily chem    INFECTIOUS  no acute concern    HEMATOLOGIC  DVT ppx: hold pharmo-ppx  rheumatology panel given vascular abnormalities  consult rheumatology for recs      ENDOCRINE  Follow up FS.  Insulin protocol as needed.  BG goal 140-180    MSK/DERM  PT/OT, OOB today unless contra-indicated per surgery or IR      CODE STATUS:  DISPO: likely ready for SDU later today discuss with vascular   KEN ROBERSON is a 67y woman without a significant medical history who presented initially with abdominal and flank pain, and is now in the critical care unit for spontaneous hemoperitoneum.  She underwent coil placement with IR and is now being monitored post-procedurally.     Impression    Spontaneous non-traumtic hemoperitoneum  Celiac Artery Rupture  SMA pseudoaneurysms  Hemorrhagic Shock (resolved)    Plan:      CNS: AAO, no concerns  CARDIOVASCULAR  Hemorrhagic shock resolved s/p MTP  Monitor for further bleeding  Increase nifedipine for HTN    PULMONARY  HOB @ 45 degrees, aspiration precautions  RA  CT chest to evaluate for ILD due to vasculitis    GASTROINTESTINAL  GI prophylaxis: not indicated  Feeds: diet  BM: increase bowel regimen    GENITOURINARY/RENAL  daily chem    INFECTIOUS  no acute concern    HEMATOLOGIC  DVT ppx: hold pharmo-ppx  pulse-dose steroids x3 days, follow-up rheumatologic labs  rheumatology panel given vascular abnormalities  consult rheumatology for recs      ENDOCRINE  Follow up FS.  Insulin protocol as needed.  BG goal 140-180    MSK/DERM  PT/OT, OOB today       CODE STATUS: FULL  DISPO: SDU

## 2022-08-25 NOTE — PROGRESS NOTE ADULT - SUBJECTIVE AND OBJECTIVE BOX
Patient is a 67y old  Female who presents with a chief complaint of Hemoperitoneum (24 Aug 2022 13:27)    HPI:  66yo F w/ no significant pmhx presented to ED with left flank pain that started around 9pm last night. Patient was relaxing/watching TV when she suddenly felt this and presented to the ED. Initially patient was hypertensive and there was an initial concern for possible kidney stone. However CT a/p showed hemoperitoneum with active extravesation of celiac artery. Patient denied any past trauma or falls. Denies being on any medications including blood thinners. Denies all other complaints except her left flank/back pain.     Patient then became hemodynamically unstable BP 79/40 and became dizzy and diaphoretic. Code fusion was called. Patient received 4 total prbcs and will get 1u of ffp and platelets. IR was called and are planning for emergent embolization. Vascular also on board.      labs: hgb 13.7 -> 12.3, coags wnl, lactate 2.5 -> 1.4  imaging:   - CT a/p IV con: Large volume dense ascites compatible with hemoperitoneum. Active contrast extravasation is seen in the subhepatic region, likely arising from a distal celiac branch vessel in the hepatic territory. A possible second smaller region of active extravasation is seen adjacent to a vessel in the region of the pancreatic tail (4-48, 4-43). A dual source of bleeding is not excluded, and a CTA may be considered for further evaluation.  - CT a/p angio: Mildly increased large volume hemoperitoneum with prominent active extravasation in the subhepatic region originating from a distal celiac branch in the hepatic territory. A second focus of extravasation is not seen on the current exam.   (23 Aug 2022 06:30)       INTERVAL HPI/OVERNIGHT EVENTS:   No overnight events   Afebrile, hemodynamically stable     Subjective:    ICU Vital Signs Last 24 Hrs  T(C): 36 (25 Aug 2022 06:00), Max: 36.7 (24 Aug 2022 20:00)  T(F): 96.8 (25 Aug 2022 06:00), Max: 98 (24 Aug 2022 20:00)  HR: 66 (25 Aug 2022 06:00) (60 - 83)  BP: 159/79 (25 Aug 2022 06:00) (120/57 - 192/83)  BP(mean): 113 (25 Aug 2022 06:00) (80 - 119)  ABP: 177/77 (25 Aug 2022 06:00) (102/86 - 208/80)  ABP(mean): 112 (25 Aug 2022 06:00) (74 - 139)  RR: 20 (25 Aug 2022 06:00) (15 - 22)  SpO2: 97% (25 Aug 2022 06:00) (95% - 99%)    O2 Parameters below as of 25 Aug 2022 06:00  Patient On (Oxygen Delivery Method): room air          I&O's Summary    23 Aug 2022 07:01  -  24 Aug 2022 07:00  --------------------------------------------------------  IN: 0 mL / OUT: 1200 mL / NET: -1200 mL    24 Aug 2022 07:01  -  25 Aug 2022 06:52  --------------------------------------------------------  IN: 406 mL / OUT: 1900 mL / NET: -1494 mL          Daily Height in cm: 170.18 (24 Aug 2022 22:00)    Daily Weight in k.2 (25 Aug 2022 06:00)    Adult Advanced Hemodynamics Last 24 Hrs  CVP(mm Hg): --  CVP(cm H2O): --  CO: --  CI: --  PA: --  PA(mean): --  PCWP: --  SVR: --  SVRI: --  PVR: --  PVRI: --    EKG/Telemetry Events:    MEDICATIONS  (STANDING):  chlorhexidine 2% Cloths 1 Application(s) Topical <User Schedule>  methylPREDNISolone sodium succinate IVPB 1000 milliGRAM(s) IV Intermittent daily  NIFEdipine XL 30 milliGRAM(s) Oral daily    MEDICATIONS  (PRN):  acetaminophen     Tablet .. 650 milliGRAM(s) Oral every 6 hours PRN Temp greater or equal to 38C (100.4F), Mild Pain (1 - 3)      PHYSICAL EXAM:  GENERAL:   HEAD:  Atraumatic, Normocephalic  EYES: conjunctiva and sclera clear  NECK: Supple  NERVOUS SYSTEM:  Alert & Awake.   CHEST/LUNG: B/L good air entry; No rales, rhonchi, or wheezing  HEART: Regular rate and rhythm; No murmurs  ABDOMEN: Soft, Nontender, Nondistended; Bowel sounds present  EXTREMITIES:  No clubbing, cyanosis, or edema  SKIN: No rashes or lesions    LABS:                        11.4   4.60  )-----------( 136      ( 25 Aug 2022 05:10 )             31.5         143  |  107  |  11  ----------------------------<  158<H>  3.6   |  26  |  <0.5<L>    Ca    8.9      25 Aug 2022 05:10  Mg     1.7         TPro  5.4<L>  /  Alb  3.6  /  TBili  0.6  /  DBili  x   /  AST  13  /  ALT  13  /  AlkPhos  59      LIVER FUNCTIONS - ( 25 Aug 2022 05:10 )  Alb: 3.6 g/dL / Pro: 5.4 g/dL / ALK PHOS: 59 U/L / ALT: 13 U/L / AST: 13 U/L / GGT: x           PT/INR - ( 23 Aug 2022 07:50 )   PT: 12.50 sec;   INR: 1.09 ratio         PTT - ( 23 Aug 2022 07:50 )  PTT:27.7 sec  CAPILLARY BLOOD GLUCOSE                      RADIOLOGY & ADDITIONAL TESTS:  CXR:        Care Discussed with Consultants/Other Providers [ x] YES  [ ] NO           Patient is a 67y old  Female who presents with a chief complaint of Hemoperitoneum (24 Aug 2022 13:27)    HPI:  66yo F w/ no significant pmhx presented to ED with left flank pain that started around 9pm last night. Patient was relaxing/watching TV when she suddenly felt this and presented to the ED. Initially patient was hypertensive and there was an initial concern for possible kidney stone. However CT a/p showed hemoperitoneum with active extravesation of celiac artery. Patient denied any past trauma or falls. Denies being on any medications including blood thinners. Denies all other complaints except her left flank/back pain.     Patient then became hemodynamically unstable BP 79/40 and became dizzy and diaphoretic. Code fusion was called. Patient received 4 total prbcs and will get 1u of ffp and platelets. IR was called and are planning for emergent embolization. Vascular also on board.      labs: hgb 13.7 -> 12.3, coags wnl, lactate 2.5 -> 1.4  imaging:   - CT a/p IV con: Large volume dense ascites compatible with hemoperitoneum. Active contrast extravasation is seen in the subhepatic region, likely arising from a distal celiac branch vessel in the hepatic territory. A possible second smaller region of active extravasation is seen adjacent to a vessel in the region of the pancreatic tail (4-48, 4-43). A dual source of bleeding is not excluded, and a CTA may be considered for further evaluation.  - CT a/p angio: Mildly increased large volume hemoperitoneum with prominent active extravasation in the subhepatic region originating from a distal celiac branch in the hepatic territory. A second focus of extravasation is not seen on the current exam.   (23 Aug 2022 06:30)       INTERVAL HPI/OVERNIGHT EVENTS:   hypertensive to 220s overnight given hydralazine push and nifedipine.     Subjective:    ICU Vital Signs Last 24 Hrs  T(C): 36 (25 Aug 2022 06:00), Max: 36.7 (24 Aug 2022 20:00)  T(F): 96.8 (25 Aug 2022 06:00), Max: 98 (24 Aug 2022 20:00)  HR: 66 (25 Aug 2022 06:00) (60 - 83)  BP: 159/79 (25 Aug 2022 06:00) (120/57 - 192/83)  BP(mean): 113 (25 Aug 2022 06:00) (80 - 119)  ABP: 177/77 (25 Aug 2022 06:00) (102/86 - 208/80)  ABP(mean): 112 (25 Aug 2022 06:00) (74 - 139)  RR: 20 (25 Aug 2022 06:00) (15 - 22)  SpO2: 97% (25 Aug 2022 06:00) (95% - 99%)    O2 Parameters below as of 25 Aug 2022 06:00  Patient On (Oxygen Delivery Method): room air          I&O's Summary    23 Aug 2022 07:01  -  24 Aug 2022 07:00  --------------------------------------------------------  IN: 0 mL / OUT: 1200 mL / NET: -1200 mL    24 Aug 2022 07:01  -  25 Aug 2022 06:52  --------------------------------------------------------  IN: 406 mL / OUT: 1900 mL / NET: -1494 mL          Daily Height in cm: 170.18 (24 Aug 2022 22:00)    Daily Weight in k.2 (25 Aug 2022 06:00)    Adult Advanced Hemodynamics Last 24 Hrs  CVP(mm Hg): --  CVP(cm H2O): --  CO: --  CI: --  PA: --  PA(mean): --  PCWP: --  SVR: --  SVRI: --  PVR: --  PVRI: --    EKG/Telemetry Events:    MEDICATIONS  (STANDING):  chlorhexidine 2% Cloths 1 Application(s) Topical <User Schedule>  methylPREDNISolone sodium succinate IVPB 1000 milliGRAM(s) IV Intermittent daily  NIFEdipine XL 30 milliGRAM(s) Oral daily    MEDICATIONS  (PRN):  acetaminophen     Tablet .. 650 milliGRAM(s) Oral every 6 hours PRN Temp greater or equal to 38C (100.4F), Mild Pain (1 - 3)      PHYSICAL EXAM:  GENERAL: laying in bed comfortably  HEAD:  Atraumatic, Normocephalic  EYES: conjunctiva and sclera clear  NECK: Supple  NERVOUS SYSTEM:  Alert & Awake Oriented x3  CHEST/LUNG: B/L good air entry; No rales, rhonchi, or wheezing  HEART: Regular rate and rhythm  ABDOMEN: Soft, tender, Nondistended  EXTREMITIES:  No clubbing, cyanosis, or edema  SKIN: No rashes or lesions      LABS:                        11.4   4.60  )-----------( 136      ( 25 Aug 2022 05:10 )             31.5         143  |  107  |  11  ----------------------------<  158<H>  3.6   |  26  |  <0.5<L>    Ca    8.9      25 Aug 2022 05:10  Mg     1.7         TPro  5.4<L>  /  Alb  3.6  /  TBili  0.6  /  DBili  x   /  AST  13  /  ALT  13  /  AlkPhos  59      LIVER FUNCTIONS - ( 25 Aug 2022 05:10 )  Alb: 3.6 g/dL / Pro: 5.4 g/dL / ALK PHOS: 59 U/L / ALT: 13 U/L / AST: 13 U/L / GGT: x           PT/INR - ( 23 Aug 2022 07:50 )   PT: 12.50 sec;   INR: 1.09 ratio         PTT - ( 23 Aug 2022 07:50 )  PTT:27.7 sec  CAPILLARY BLOOD GLUCOSE                      RADIOLOGY & ADDITIONAL TESTS:  CXR:      ACC: [id number] EXAM:  XR CHEST PORTABLE ROUTINE 1V                          PROCEDURE DATE:  2022        No consolidation, effusion or pneumothorax.. Mild elevation right   hemidiaphragm          Care Discussed with Consultants/Other Providers [ x] YES  [ ] NO           Patient is a 67y old  Female who presents with a chief complaint of Hemoperitoneum (24 Aug 2022 13:27)    HPI:  68yo F w/ no significant pmhx presented to ED with left flank pain that started around 9pm last night. Patient was relaxing/watching TV when she suddenly felt this and presented to the ED. Initially patient was hypertensive and there was an initial concern for possible kidney stone. However CT a/p showed hemoperitoneum with active extravesation of celiac artery. Patient denied any past trauma or falls. Denies being on any medications including blood thinners. Denies all other complaints except her left flank/back pain.     Patient then became hemodynamically unstable BP 79/40 and became dizzy and diaphoretic. Code fusion was called. Patient received 4 total prbcs and will get 1u of ffp and platelets. IR was called and are planning for emergent embolization. Vascular also on board.      labs: hgb 13.7 -> 12.3, coags wnl, lactate 2.5 -> 1.4  imaging:   - CT a/p IV con: Large volume dense ascites compatible with hemoperitoneum. Active contrast extravasation is seen in the subhepatic region, likely arising from a distal celiac branch vessel in the hepatic territory. A possible second smaller region of active extravasation is seen adjacent to a vessel in the region of the pancreatic tail (4-48, 4-43). A dual source of bleeding is not excluded, and a CTA may be considered for further evaluation.  - CT a/p angio: Mildly increased large volume hemoperitoneum with prominent active extravasation in the subhepatic region originating from a distal celiac branch in the hepatic territory. A second focus of extravasation is not seen on the current exam.   (23 Aug 2022 06:30)       INTERVAL HPI/OVERNIGHT EVENTS:   hypertensive to 220s overnight given hydralazine push and PO nifedipine.     Subjective:    ICU Vital Signs Last 24 Hrs  T(C): 36 (25 Aug 2022 06:00), Max: 36.7 (24 Aug 2022 20:00)  T(F): 96.8 (25 Aug 2022 06:00), Max: 98 (24 Aug 2022 20:00)  HR: 66 (25 Aug 2022 06:00) (60 - 83)  BP: 159/79 (25 Aug 2022 06:00) (120/57 - 192/83)  BP(mean): 113 (25 Aug 2022 06:00) (80 - 119)  ABP: 177/77 (25 Aug 2022 06:00) (102/86 - 208/80)  ABP(mean): 112 (25 Aug 2022 06:00) (74 - 139)  RR: 20 (25 Aug 2022 06:00) (15 - 22)  SpO2: 97% (25 Aug 2022 06:00) (95% - 99%)    O2 Parameters below as of 25 Aug 2022 06:00  Patient On (Oxygen Delivery Method): room air          I&O's Summary    23 Aug 2022 07:01  -  24 Aug 2022 07:00  --------------------------------------------------------  IN: 0 mL / OUT: 1200 mL / NET: -1200 mL    24 Aug 2022 07:01  -  25 Aug 2022 06:52  --------------------------------------------------------  IN: 406 mL / OUT: 1900 mL / NET: -1494 mL          Daily Height in cm: 170.18 (24 Aug 2022 22:00)    Daily Weight in k.2 (25 Aug 2022 06:00)    Adult Advanced Hemodynamics Last 24 Hrs  CVP(mm Hg): --  CVP(cm H2O): --  CO: --  CI: --  PA: --  PA(mean): --  PCWP: --  SVR: --  SVRI: --  PVR: --  PVRI: --    EKG/Telemetry Events:    MEDICATIONS  (STANDING):  chlorhexidine 2% Cloths 1 Application(s) Topical <User Schedule>  methylPREDNISolone sodium succinate IVPB 1000 milliGRAM(s) IV Intermittent daily  NIFEdipine XL 30 milliGRAM(s) Oral daily    MEDICATIONS  (PRN):  acetaminophen     Tablet .. 650 milliGRAM(s) Oral every 6 hours PRN Temp greater or equal to 38C (100.4F), Mild Pain (1 - 3)      PHYSICAL EXAM:  GENERAL: laying in bed comfortably  HEAD:  Atraumatic, Normocephalic  EYES: conjunctiva and sclera clear  NECK: Supple  NERVOUS SYSTEM:  Alert & Awake Oriented x3  CHEST/LUNG: B/L good air entry; No rales, rhonchi, or wheezing  HEART: Regular rate and rhythm  ABDOMEN: Soft, tender, Nondistended  EXTREMITIES:  No clubbing, cyanosis, or edema  SKIN: No rashes or lesions      LABS:                        11.4   4.60  )-----------( 136      ( 25 Aug 2022 05:10 )             31.5         143  |  107  |  11  ----------------------------<  158<H>  3.6   |  26  |  <0.5<L>    Ca    8.9      25 Aug 2022 05:10  Mg     1.7         TPro  5.4<L>  /  Alb  3.6  /  TBili  0.6  /  DBili  x   /  AST  13  /  ALT  13  /  AlkPhos  59      LIVER FUNCTIONS - ( 25 Aug 2022 05:10 )  Alb: 3.6 g/dL / Pro: 5.4 g/dL / ALK PHOS: 59 U/L / ALT: 13 U/L / AST: 13 U/L / GGT: x           PT/INR - ( 23 Aug 2022 07:50 )   PT: 12.50 sec;   INR: 1.09 ratio         PTT - ( 23 Aug 2022 07:50 )  PTT:27.7 sec  CAPILLARY BLOOD GLUCOSE                      RADIOLOGY & ADDITIONAL TESTS:  CXR:      ACC: [id number] EXAM:  XR CHEST PORTABLE ROUTINE 1V                          PROCEDURE DATE:  2022        No consolidation, effusion or pneumothorax.. Mild elevation right   hemidiaphragm          Care Discussed with Consultants/Other Providers [ x] YES  [ ] NO

## 2022-08-25 NOTE — CHART NOTE - NSCHARTNOTEFT_GEN_A_CORE
CCU Transfer Note    Transfer from: CCU  Transfer to:  (  ) Medicine    ( x ) Telemetry    (  ) RCU    (  ) Palliative    (  ) Stroke Unit    (  ) _______________  Accepting physician:    MEDICATIONS:  STANDING MEDICATIONS  chlorhexidine 2% Cloths 1 Application(s) Topical <User Schedule>  lactulose Syrup 20 Gram(s) Oral every 3 hours  methylPREDNISolone sodium succinate IVPB 1000 milliGRAM(s) IV Intermittent daily  NIFEdipine XL 30 milliGRAM(s) Oral daily    PRN MEDICATIONS  acetaminophen     Tablet .. 650 milliGRAM(s) Oral every 6 hours PRN      VITAL SIGNS: Last 24 Hours  T(C): 36.4 (25 Aug 2022 08:00), Max: 36.7 (24 Aug 2022 20:00)  T(F): 97.5 (25 Aug 2022 08:00), Max: 98 (24 Aug 2022 20:00)  HR: 88 (25 Aug 2022 14:05) (60 - 88)  BP: 170/82 (25 Aug 2022 08:00) (120/57 - 192/83)  BP(mean): 118 (25 Aug 2022 08:00) (80 - 119)  RR: 23 (25 Aug 2022 14:05) (15 - 23)  SpO2: 96% (25 Aug 2022 14:05) (95% - 98%)    LABS:                        11.4   4.60  )-----------( 136      ( 25 Aug 2022 05:10 )             31.5     08-25    143  |  107  |  11  ----------------------------<  158<H>  3.6   |  26  |  <0.5<L>    Ca    8.9      25 Aug 2022 05:10  Mg     1.7     08-25    TPro  5.4<L>  /  Alb  3.6  /  TBili  0.6  /  DBili  x   /  AST  13  /  ALT  13  /  AlkPhos  59  08-25            Culture - Urine (collected 23 Aug 2022 00:10)  Source: Clean Catch Clean Catch (Midstream)  Final Report (24 Aug 2022 10:17):    <10,000 CFU/mL Normal Urogen[facility] [place]          RADIOLOGY:  XR CHEST PORTABLE ROUTINE 1V                          PROCEDURE DATE:  08/24/2022      No consolidation, effusion or pneumothorax.. Mild elevation right   hemidiaphragm        CCU COURSE:    68yo F w/ no significant pmhx presented to ED with left flank pain that started around 9pm last night. Patient was relaxing/watching TV when she suddenly felt this and presented to the ED. Initially patient was hypertensive and there was an initial concern for possible kidney stone. However CT a/p showed hemoperitoneum with active extravesation of celiac artery. Patient denied any past trauma or falls. Denies being on any medications including blood thinners. Denies all other complaints except her left flank/back pain.     Patient then became hemodynamically unstable BP 79/40 and became dizzy and diaphoretic. Code fusion was called. Patient received 4 total prbcs and will get 1u of ffp and platelets. IR was called and are planning for emergent embolization. Vascular also on board.      labs: hgb 13.7 -> 12.3, coags wnl, lactate 2.5 -> 1.4  imaging:   - CT a/p IV con: Large volume dense ascites compatible with hemoperitoneum. Active contrast extravasation is seen in the subhepatic region, likely arising from a distal celiac branch vessel in the hepatic territory. A possible second smaller region of active extravasation is seen adjacent to a vessel in the region of the pancreatic tail (4-48, 4-43). A dual source of bleeding is not excluded, and a CTA may be considered for further evaluation.  - CT a/p angio: Mildly increased large volume hemoperitoneum with prominent active extravasation in the subhepatic region originating from a distal celiac branch in the hepatic territory. A second focus of extravasation is not seen on the current exam.        ASSESSMENT & PLAN:     KEN ROBERSON is a 67y woman without a significant medical history who presented initially with abdominal and flank pain, and is now in the critical care unit for spontaneous hemoperitoneum.  She underwent coil placement with IR and is now being monitored post-procedurally.     Impression    Spontaneous non-traumtic hemoperitoneum  Celiac Artery Rupture  SMA pseudoaneurysms  Hemorrhagic Shock (resolved)    Plan:      CNS: AAO, no concerns  CARDIOVASCULAR  Hemorrhagic shock resolved s/p MTP  Monitor for further bleeding  Increase nifedipine for HTN    PULMONARY  HOB @ 45 degrees, aspiration precautions  RA    GASTROINTESTINAL  GI prophylaxis: not indicated  Feeds: diet  BM: increase bowel regimen    GENITOURINARY/RENAL  daily chem    INFECTIOUS  no acute concern    HEMATOLOGIC  DVT ppx: hold pharmo-ppx  rheumatology panel given vascular abnormalities  consult rheumatology for recs      ENDOCRINE  Follow up FS.  Insulin protocol as needed.  BG goal 140-180    MSK/DERM  PT/OT, OOB today unless contra-indicated per surgery or IR          For Follow-Up:    - Please start Solumedrol 1000 mg IV q day x 3 days, then transition to 60 mg PO q day  - Please start Bactrim 1 SS tablet daily for PCP prophylaxis  - Please send hepatitis B core antibody and surface antigen, cryoglobulin, quantiferon, SPEP, HIV, Sjogrens Ro and La, rheumatoid factor, cyclic citrullinated peptide, quantitative IgA  - f/u the following tests which were already sent: RUBEN, complements C3 and C4, ESR, CRP, ANCA levels  - Please obtain CT angiogram of the chest to further evaluate the extent of pt's vascular involvement  - Plan for possible rituximab depending on the results of the above bloodwork.       -continue ICU level of care for hemodynamic monitoring   -Workup for suspected vasculitides   -Serial CBCs   -Surgery to follow   -Strict I/O   -monitor vitals CCU Transfer Note    Transfer from: CCU  Transfer to:  (  ) Medicine    (  ) Telemetry    (  ) RCU    (  ) Palliative    (  ) Stroke Unit    ( x ) Stepdown  Accepting physician:    MEDICATIONS:  STANDING MEDICATIONS  chlorhexidine 2% Cloths 1 Application(s) Topical <User Schedule>  lactulose Syrup 20 Gram(s) Oral every 3 hours  methylPREDNISolone sodium succinate IVPB 1000 milliGRAM(s) IV Intermittent daily  NIFEdipine XL 30 milliGRAM(s) Oral daily    PRN MEDICATIONS  acetaminophen     Tablet .. 650 milliGRAM(s) Oral every 6 hours PRN      VITAL SIGNS: Last 24 Hours  T(C): 36.4 (25 Aug 2022 08:00), Max: 36.7 (24 Aug 2022 20:00)  T(F): 97.5 (25 Aug 2022 08:00), Max: 98 (24 Aug 2022 20:00)  HR: 88 (25 Aug 2022 14:05) (60 - 88)  BP: 170/82 (25 Aug 2022 08:00) (120/57 - 192/83)  BP(mean): 118 (25 Aug 2022 08:00) (80 - 119)  RR: 23 (25 Aug 2022 14:05) (15 - 23)  SpO2: 96% (25 Aug 2022 14:05) (95% - 98%)    LABS:                        11.4   4.60  )-----------( 136      ( 25 Aug 2022 05:10 )             31.5     08-25    143  |  107  |  11  ----------------------------<  158<H>  3.6   |  26  |  <0.5<L>    Ca    8.9      25 Aug 2022 05:10  Mg     1.7     08-25    TPro  5.4<L>  /  Alb  3.6  /  TBili  0.6  /  DBili  x   /  AST  13  /  ALT  13  /  AlkPhos  59  08-25            Culture - Urine (collected 23 Aug 2022 00:10)  Source: Clean Catch Clean Catch (Midstream)  Final Report (24 Aug 2022 10:17):    <10,000 CFU/mL Normal Urogenital [place]          RADIOLOGY:  XR CHEST PORTABLE ROUTINE 1V                          PROCEDURE DATE:  08/24/2022      No consolidation, effusion or pneumothorax.. Mild elevation right   hemidiaphragm      CCU COURSE:    66yo F w/ no significant pmhx presented to ED with left flank pain that started around 9pm last night. Patient was relaxing/watching TV when she suddenly felt this and presented to the ED. Initially patient was hypertensive and there was an initial concern for possible kidney stone. However CT a/p showed hemoperitoneum with active extravesation of celiac artery. Patient denied any past trauma or falls. Denies being on any medications including blood thinners. Denies all other complaints except her left flank/back pain.     Patient then became hemodynamically unstable BP 79/40 and became dizzy and diaphoretic. Code fusion was called. Patient received 4 total prbcs and will get 1u of ffp and platelets. IR was called and are planning for emergent embolization. Vascular also on board.      labs: hgb 13.7 -> 12.3, coags wnl, lactate 2.5 -> 1.4  imaging:   - CT a/p IV con: Large volume dense ascites compatible with hemoperitoneum. Active contrast extravasation is seen in the subhepatic region, likely arising from a distal celiac branch vessel in the hepatic territory. A possible second smaller region of active extravasation is seen adjacent to a vessel in the region of the pancreatic tail (4-48, 4-43). A dual source of bleeding is not excluded, and a CTA may be considered for further evaluation.  - CT a/p angio: Mildly increased large volume hemoperitoneum with prominent active extravasation in the subhepatic region originating from a distal celiac branch in the hepatic territory. A second focus of extravasation is not seen on the current exam.  - CT angio head + neck: No cervical or intracranial aneurysm or pseudoaneurysm. No evidence of major vascular stenosis or occlusion.  - CT chest IV con: Small bilateral pleural effusions with adjacent atelectasis.    ASSESSMENT AND PLAN    KEN ROBERSON is a 67y woman without a significant medical history who presented initially with abdominal and flank pain, and is now in the critical care unit for spontaneous hemoperitoneum.  She underwent coil placement with IR and is now being monitored post-procedurally.     Impression    Medium vessel vasculitis  Spontaneous non-traumatic hemoperitoneum  Celiac Artery Rupture  SMA pseudoaneurysms  Hemorrhagic Shock (resolved)    Plan:      CNS: AAO, no concerns    CARDIOVASCULAR  Hemorrhagic shock resolved s/p MTP  Monitor for further bleeding  Increase nifedipine for HTN    PULMONARY  HOB @ 45 degrees, aspiration precautions  RA  CT chest does not demonstrate any signs of ILD, just bibasilar atelectasis and trace effusions    GASTROINTESTINAL  GI prophylaxis: not indicated  Feeds: diet  BM: increase bowel regimen    GENITOURINARY/RENAL  daily chem    INFECTIOUS  no acute concern    HEMATOLOGIC  DVT ppx: hold pharmo-ppx  pulse-dose steroids x3 days, follow-up rheumatologic labs  rheumatology panel given vascular abnormalities  consult rheumatology for recs      ENDOCRINE  Follow up FS.  Insulin protocol as needed.  BG goal 140-180    MSK/DERM  PT/OT, OOB     For Follow-Up:    - Please continue Solumedrol 1000 mg IV q day x 3 days, then transition to 60 mg PO q day  - Please continue Bactrim 1 SS tablet daily for PCP prophylaxis  - F/u rheumatology labs: RUBEN, complements C3 and C4, ESR, CRP, ANCA levels, hepatitis B core antibody and surface antigen, cryoglobulin, QuantiFeron, SPEP, HIV, Sjögren's Ro and La, rheumatoid factor, cyclic citrullinated peptide, quantitative IgA  - Plan for possible rituximab depending on the results of the above blood work  - Please obtain CT angiogram of the chest to further evaluate the extent of pt's vascular involvement when sure that pt tolerated contrast from 8/25     -continue ICU level of care for hemodynamic monitoring   -Workup for suspected vasculitides   -Serial CBCs   -Surgery to follow   -Strict I/O   -monitor vitals CCU Transfer Note    Transfer from: CCU  Transfer to:  ( x ) Medicine    (  ) Telemetry    (  ) RCU    (  ) Palliative    (  ) Stroke Unit    ( x ) Stepdown  Accepting physician: Dr. Barillas    MEDICATIONS:  STANDING MEDICATIONS  chlorhexidine 2% Cloths 1 Application(s) Topical <User Schedule>  lactulose Syrup 20 Gram(s) Oral every 3 hours  methylPREDNISolone sodium succinate IVPB 1000 milliGRAM(s) IV Intermittent daily  NIFEdipine XL 30 milliGRAM(s) Oral daily    PRN MEDICATIONS  acetaminophen     Tablet .. 650 milliGRAM(s) Oral every 6 hours PRN      VITAL SIGNS: Last 24 Hours  T(C): 36.4 (25 Aug 2022 08:00), Max: 36.7 (24 Aug 2022 20:00)  T(F): 97.5 (25 Aug 2022 08:00), Max: 98 (24 Aug 2022 20:00)  HR: 88 (25 Aug 2022 14:05) (60 - 88)  BP: 170/82 (25 Aug 2022 08:00) (120/57 - 192/83)  BP(mean): 118 (25 Aug 2022 08:00) (80 - 119)  RR: 23 (25 Aug 2022 14:05) (15 - 23)  SpO2: 96% (25 Aug 2022 14:05) (95% - 98%)    LABS:                        11.4   4.60  )-----------( 136      ( 25 Aug 2022 05:10 )             31.5     08-25    143  |  107  |  11  ----------------------------<  158<H>  3.6   |  26  |  <0.5<L>    Ca    8.9      25 Aug 2022 05:10  Mg     1.7     08-25    TPro  5.4<L>  /  Alb  3.6  /  TBili  0.6  /  DBili  x   /  AST  13  /  ALT  13  /  AlkPhos  59  08-25            Culture - Urine (collected 23 Aug 2022 00:10)  Source: Clean Catch Clean Catch (Midstream)  Final Report (24 Aug 2022 10:17):    <10,000 CFU/mL Normal Urogenital [place]          RADIOLOGY:  XR CHEST PORTABLE ROUTINE 1V                          PROCEDURE DATE:  08/24/2022      No consolidation, effusion or pneumothorax.. Mild elevation right   hemidiaphragm      CCU COURSE:    68yo F w/ no significant pmhx presented to ED with left flank pain that started around 9pm last night. Patient was relaxing/watching TV when she suddenly felt this and presented to the ED. Initially patient was hypertensive and there was an initial concern for possible kidney stone. However CT a/p showed hemoperitoneum with active extravesation of celiac artery. Patient denied any past trauma or falls. Denies being on any medications including blood thinners. Denies all other complaints except her left flank/back pain.     Patient then became hemodynamically unstable BP 79/40 and became dizzy and diaphoretic. Code fusion was called. Patient received 4 total prbcs and will get 1u of ffp and platelets. IR was called and are planning for emergent embolization. Vascular also on board.      labs: hgb 13.7 -> 12.3, coags wnl, lactate 2.5 -> 1.4  imaging:   - CT a/p IV con: Large volume dense ascites compatible with hemoperitoneum. Active contrast extravasation is seen in the subhepatic region, likely arising from a distal celiac branch vessel in the hepatic territory. A possible second smaller region of active extravasation is seen adjacent to a vessel in the region of the pancreatic tail (4-48, 4-43). A dual source of bleeding is not excluded, and a CTA may be considered for further evaluation.  - CT a/p angio: Mildly increased large volume hemoperitoneum with prominent active extravasation in the subhepatic region originating from a distal celiac branch in the hepatic territory. A second focus of extravasation is not seen on the current exam.  - CT angio head + neck: No cervical or intracranial aneurysm or pseudoaneurysm. No evidence of major vascular stenosis or occlusion.  - CT chest IV con: Small bilateral pleural effusions with adjacent atelectasis.    ASSESSMENT AND PLAN    KEN ROBERSON is a 67y woman without a significant medical history who presented initially with abdominal and flank pain, and is now in the critical care unit for spontaneous hemoperitoneum.  She underwent coil placement with IR and is now being monitored post-procedurally.     Impression    Medium vessel vasculitis  Spontaneous non-traumatic hemoperitoneum  Celiac Artery Rupture  SMA pseudoaneurysms  Hemorrhagic Shock (resolved)    Plan:      CNS: AAO, no concerns    CARDIOVASCULAR  Hemorrhagic shock resolved s/p MTP  Monitor for further bleeding  Increase nifedipine for HTN    PULMONARY  HOB @ 45 degrees, aspiration precautions  RA  CT chest does not demonstrate any signs of ILD, just bibasilar atelectasis and trace effusions    GASTROINTESTINAL  GI prophylaxis: not indicated  Feeds: diet  BM: increase bowel regimen    GENITOURINARY/RENAL  daily chem    INFECTIOUS  no acute concern    HEMATOLOGIC  DVT ppx: hold pharmo-ppx  prednisone, follow-up rheumatologic labs  rheumatology panel given vascular abnormalities  fu rheumatology recs      ENDOCRINE  Follow up FS.  Insulin protocol as needed.  BG goal 140-180    MSK/DERM  PT/OT, OOB     For Follow-Up:    - Please continue prednisone 60 mg PO q day  - F/u rheumatology labs: RUBEN, complements C3 and C4, ESR, CRP, ANCA levels, hepatitis B core antibody and surface antigen, cryoglobulin, QuantiFeron, SPEP, HIV, Sjögren's Ro and La, rheumatoid factor, cyclic citrullinated peptide, quantitative IgA  - Plan for possible rituximab depending on the results of the above blood work   -Serial CBCs   -Surgery to follow   -Strict I/O   -monitor vitals

## 2022-08-25 NOTE — PROGRESS NOTE ADULT - ASSESSMENT
67F w/ no PMH/PSH who presents with acute onset abdominal pain that started 8/22/22 around 8pm, progressively worsened overnight prompting visit to the ED, found to be hypotensive in ED and with large volume hemoperitoneum seen on CT, CTA performed showing active extravasation likely stemming from a downstream branch of the celiac axis.  General surgery and Vascular surgery teams were consulted and following.  MTP called and patient ultimately received 5uRBC, 1FFP, 1PLT.  Throughout hospital course patient remained AAOx3 and saturating well on RA, R radial Mariely and RIJ TLC placed in ED and BP responded to transfusions with MAPs persistently above 65, no pressors required.  Patient underwent emergent IR embolization of right gastric artery which was demonstrated to have active bleeding on angio.  Intraprocedural findings were significant for multiple aneurysms visualized throughout most small to medium sized vessels, including intrahepatic and splenic vessels, suspicious for underlying vasculitis.  Source of bleeding suspected to be from rupture of one of these aneurysms.  Post-procedure patient admitted to CCU unit.    Plan:   -continue ICU level of care for hemodynamic monitoring   -Workup for suspected vasculitides   -Serial CBCs   -Surgery to follow   -Strict I/O   -monitor vitals    [id number]

## 2022-08-25 NOTE — PROGRESS NOTE ADULT - ATTENDING COMMENTS
Patient improving, still requires extensive rheumatologic workup. Feels well, wants to get out of bed. Stable for transfer to step down unit for further monitoring.  I agree with the resident note, with the exceptions listed in my attestation above.  The remainder of impression and plan per resident note.

## 2022-08-26 LAB
ANION GAP SERPL CALC-SCNC: 8 MMOL/L — SIGNIFICANT CHANGE UP (ref 7–14)
BUN SERPL-MCNC: 18 MG/DL — SIGNIFICANT CHANGE UP (ref 10–20)
CALCIUM SERPL-MCNC: 9.4 MG/DL — SIGNIFICANT CHANGE UP (ref 8.5–10.1)
CHLORIDE SERPL-SCNC: 107 MMOL/L — SIGNIFICANT CHANGE UP (ref 98–110)
CO2 SERPL-SCNC: 25 MMOL/L — SIGNIFICANT CHANGE UP (ref 17–32)
CREAT SERPL-MCNC: 0.5 MG/DL — LOW (ref 0.7–1.5)
EGFR: 101 ML/MIN/1.73M2 — SIGNIFICANT CHANGE UP
GLUCOSE BLDC GLUCOMTR-MCNC: 114 MG/DL — HIGH (ref 70–99)
GLUCOSE BLDC GLUCOMTR-MCNC: 190 MG/DL — HIGH (ref 70–99)
GLUCOSE BLDC GLUCOMTR-MCNC: 201 MG/DL — HIGH (ref 70–99)
GLUCOSE SERPL-MCNC: 120 MG/DL — HIGH (ref 70–99)
HCT VFR BLD CALC: 33.9 % — LOW (ref 37–47)
HGB BLD-MCNC: 11.8 G/DL — LOW (ref 12–16)
MAGNESIUM SERPL-MCNC: 2 MG/DL — SIGNIFICANT CHANGE UP (ref 1.8–2.4)
MCHC RBC-ENTMCNC: 28.4 PG — SIGNIFICANT CHANGE UP (ref 27–31)
MCHC RBC-ENTMCNC: 34.8 G/DL — SIGNIFICANT CHANGE UP (ref 32–37)
MCV RBC AUTO: 81.5 FL — SIGNIFICANT CHANGE UP (ref 81–99)
NRBC # BLD: 0 /100 WBCS — SIGNIFICANT CHANGE UP (ref 0–0)
PLATELET # BLD AUTO: 146 K/UL — SIGNIFICANT CHANGE UP (ref 130–400)
POTASSIUM SERPL-MCNC: 4.1 MMOL/L — SIGNIFICANT CHANGE UP (ref 3.5–5)
POTASSIUM SERPL-SCNC: 4.1 MMOL/L — SIGNIFICANT CHANGE UP (ref 3.5–5)
RBC # BLD: 4.16 M/UL — LOW (ref 4.2–5.4)
RBC # FLD: 14.6 % — HIGH (ref 11.5–14.5)
SODIUM SERPL-SCNC: 140 MMOL/L — SIGNIFICANT CHANGE UP (ref 135–146)
WBC # BLD: 8.37 K/UL — SIGNIFICANT CHANGE UP (ref 4.8–10.8)
WBC # FLD AUTO: 8.37 K/UL — SIGNIFICANT CHANGE UP (ref 4.8–10.8)

## 2022-08-26 PROCEDURE — 99233 SBSQ HOSP IP/OBS HIGH 50: CPT | Mod: GC

## 2022-08-26 PROCEDURE — 99233 SBSQ HOSP IP/OBS HIGH 50: CPT

## 2022-08-26 RX ADMIN — Medication 1 TABLET(S): at 17:14

## 2022-08-26 RX ADMIN — CHLORHEXIDINE GLUCONATE 1 APPLICATION(S): 213 SOLUTION TOPICAL at 05:16

## 2022-08-26 RX ADMIN — POLYETHYLENE GLYCOL 3350 17 GRAM(S): 17 POWDER, FOR SOLUTION ORAL at 11:01

## 2022-08-26 RX ADMIN — Medication 116 MILLIGRAM(S): at 05:15

## 2022-08-26 RX ADMIN — Medication 60 MILLIGRAM(S): at 05:16

## 2022-08-26 NOTE — PROGRESS NOTE ADULT - ATTENDING COMMENTS
I spoke with Ms Silviano, she is feeling much improved, and wants to work towards getting home.  She is anxious about all the activity in the ICU, and is concerned about ongoing constipation.  No further ICU interventions needed, simply ongoing monitoring for rebleeding and completion of rheumatologic workup.    Transfer to SDU    I agree with the resident note, with the exceptions listed in my attestation above.  The remainder of impression and plan per resident note.

## 2022-08-26 NOTE — PROGRESS NOTE ADULT - ASSESSMENT
KEN ROBERSON is a 67y woman without a significant medical history who presented initially with abdominal and flank pain, and is now in the critical care unit for spontaneous hemoperitoneum.  She underwent coil placement with IR and is now being monitored post-procedurally.     Plan    # Medium vessel vasculitis  # Spontaneous non-traumatic hemoperitoneum  # Celiac Artery Rupture  # SMA pseudoaneurysms  # Hemorrhagic Shock (resolved)  - Increase nifedipine for HTN  - Pulse-dose steroids x3 days  - F/u rheumatologic labs  - C/s rheum for SMA pseudoaneurysms i/s/o medium vessel vasculitis, appreciate recs    # Misc  - DVT Prophylaxis: Compression Device Sequential  - GI Prophylaxis: Not indicated  - Diet: Diet, DASH/TLC  - Code Status: Full    Lukas Flynn MD  PGY1, Internal Medicine   [facility]

## 2022-08-26 NOTE — PROGRESS NOTE ADULT - ASSESSMENT
Concern for vasculitis of the celiac axis: with multiple pseudoaneurysms and vascular irregularities concerning for small to medium vessel vasculitis involving multiple arteries, including the right gastric artery and the superior mesenteric artery. Aside from acute-onset abdominal pain in the setting of pt's acute bleed, she has had no other symptoms to suggest any particular type of vasculitis, and her bloodwork so far has been surprisingly unremarkable, with negative bloodwork so far for systemic conditions that can be a/w vasculitis, normal ESR and only slightly elevated CRP. However, given that it is not feasible to obtain a biopsy of the affected area, I would treat pt with steroids for presumed vasculitis. Evidence of vasculitis does not necessarily appear on bloodwork in all patients. The differential for pt's findings remains a localized GI vasculitis, versus an initial presentation of a systemic vasculitis such as ANCA vasculitis (which is not always ANCA positive).   - s/p Solumedrol 1000 mg IV q day 8/24-8/26, transition to prednisone 60 mg q day starting tomorrow 8/27  - Please start Bactrim 1 SS tablet daily for PCP prophylaxis  - Please obtain CTA chest, abdomen and pelvis to assess the extent of pt's vascular involvement  - Pt can follow up with me as an outpatient within 2 weeks of discharge, I provided her with my business card - Loree Nichole, Rheumatology, [address] [facility] [address], Phone [phone]

## 2022-08-26 NOTE — PROGRESS NOTE ADULT - SUBJECTIVE AND OBJECTIVE BOX
PATIENT:  KEN ROBERSON  [id number]    CHIEF COMPLAINT:  Patient is a 67y old  Female who presents with a chief complaint of Hemoperitoneum (26 Aug 2022 10:14)    INTERVAL HISTORY/OVERNIGHT EVENTS:  No acute events overnight.    MEDICATIONS:  MEDICATIONS  (STANDING):  chlorhexidine 2% Cloths 1 Application(s) Topical <User Schedule>  methylPREDNISolone sodium succinate IVPB 1000 milliGRAM(s) IV Intermittent daily  NIFEdipine XL 60 milliGRAM(s) Oral daily  polyethylene glycol 3350 17 Gram(s) Oral daily    MEDICATIONS  (PRN):  acetaminophen     Tablet .. 650 milliGRAM(s) Oral every 6 hours PRN Temp greater or equal to 38C (100.4F), Mild Pain (1 - 3)    ALLERGIES:  Allergies    No Known Allergies    Intolerances    OBJECTIVE:  ICU Vital Signs Last 24 Hrs  T(C): 36.2 (26 Aug 2022 06:00), Max: 36.7 (25 Aug 2022 20:00)  T(F): 97.1 (26 Aug 2022 06:00), Max: 98 (25 Aug 2022 20:00)  HR: 82 (26 Aug 2022 12:00) (53 - 93)  BP: 174/71 (26 Aug 2022 07:00) (144/73 - 174/71)  BP(mean): 102 (26 Aug 2022 06:00) (99 - 103)  ABP: 103/49 (26 Aug 2022 12:00) (103/49 - 181/79)  ABP(mean): 69 (26 Aug 2022 12:00) (69 - 115)  RR: 24 (26 Aug 2022 12:00) (15 - 29)  SpO2: 94% (26 Aug 2022 12:00) (92% - 97%)    O2 Parameters below as of 26 Aug 2022 12:00  Patient On (Oxygen Delivery Method): room air    CAPILLARY BLOOD GLUCOSE      POCT Blood Glucose.: 201 mg/dL (26 Aug 2022 11:03)    I&O's Summary    25 Aug 2022 07:01  -  26 Aug 2022 07:00  --------------------------------------------------------  IN: 880 mL / OUT: 2050 mL / NET: -1170 mL    26 Aug 2022 07:01  -  26 Aug 2022 12:47  --------------------------------------------------------  IN: 0 mL / OUT: 500 mL / NET: -500 mL    Daily Weight in k.9 (26 Aug 2022 06:00)    PHYSICAL EXAMINATION:  GENERAL: NAD, lying in bed comfortably  HEAD:  Atraumatic, Normocephalic  EYES: EOMI, PERRLA, conjunctiva and sclera clear  ENT: Moist mucous membranes  NECK: Supple, No JVD  CHEST/LUNG: Clear to auscultation bilaterally; No rales, rhonchi, wheezing, or rubs. Unlabored respirations  HEART: Regular rate and rhythm; No murmurs, rubs, or gallops  ABDOMEN: BSx4; Soft, nontender, nondistended  EXTREMITIES:  2+ Peripheral Pulses, brisk capillary refill. No clubbing, cyanosis, or edema  NERVOUS SYSTEM:  A&Ox3, no focal deficits   SKIN: No rashes or lesions    LABS:                          11.8   8.37  )-----------( 146      ( 26 Aug 2022 05:25 )             33.9     08-    140  |  107  |  18  ----------------------------<  120<H>  4.1   |  25  |  0.5<L>    Ca    9.4      26 Aug 2022 05:25  Mg     2.0         TPro  5.4<L>  /  Alb  3.6  /  TBili  0.6  /  DBili  x   /  AST  13  /  ALT  13  /  AlkPhos  59  08-    LIVER FUNCTIONS - ( 25 Aug 2022 05:10 )  Alb: 3.6 g/dL / Pro: 5.4 g/dL / ALK PHOS: 59 U/L / ALT: 13 U/L / AST: 13 U/L / GGT: x

## 2022-08-26 NOTE — PROGRESS NOTE ADULT - ASSESSMENT
KEN ROBERSON is a 67y woman without a significant medical history who presented initially with abdominal and flank pain, and is now in the critical care unit for spontaneous hemoperitoneum.  She underwent coil placement with IR and is now being monitored post-procedurally.     Impression    Medium vessel vasculitis  Spontaneous non-traumtic hemoperitoneum  Celiac Artery Rupture  SMA pseudoaneurysms  Hemorrhagic Shock (resolved)    Plan:      CNS: AAO, no concerns    CARDIOVASCULAR  Hemorrhagic shock resolved s/p MTP  Monitor for further bleeding  Increase nifedipine for HTN    PULMONARY  HOB @ 45 degrees, aspiration precautions  RA  CT chest does not demonstrate any signs of ILD, just bibasilar atelectasis and trace effusions    GASTROINTESTINAL  GI prophylaxis: not indicated  Feeds: diet  BM: increase bowel regimen    GENITOURINARY/RENAL  daily chem    INFECTIOUS  no acute concern    HEMATOLOGIC  DVT ppx: hold pharmo-ppx  pulse-dose steroids x3 days, follow-up rheumatologic labs  rheumatology panel given vascular abnormalities  consult rheumatology for recs      ENDOCRINE  Follow up FS.  Insulin protocol as needed.  BG goal 140-180    MSK/DERM  PT/OT, OOB       CODE STATUS: FULL  DISPO: SDU

## 2022-08-26 NOTE — PROGRESS NOTE ADULT - SUBJECTIVE AND OBJECTIVE BOX
Patient is a 67y old  Female who presents with a chief complaint of Hemoperitoneum (25 Aug 2022 13:37)        Over Night Events:    naoe.  feeling well. OOB to chair  abdominal pain almost totally resolved    ROS:     All ROS are negative except HPI           PHYSICAL EXAM    ICU Vital Signs Last 24 Hrs  T(C): 36.2 (26 Aug 2022 06:00), Max: 36.7 (25 Aug 2022 20:00)  T(F): 97.1 (26 Aug 2022 06:00), Max: 98 (25 Aug 2022 20:00)  HR: 78 (26 Aug 2022 10:00) (53 - 93)  BP: 174/71 (26 Aug 2022 07:00) (144/73 - 174/71)  BP(mean): 102 (26 Aug 2022 06:00) (99 - 103)  ABP: 127/58 (26 Aug 2022 10:00) (109/57 - 181/79)  ABP(mean): 87 (26 Aug 2022 10:00) (73 - 115)  RR: 25 (26 Aug 2022 10:00) (15 - 29)  SpO2: 96% (26 Aug 2022 10:00) (93% - 97%)    O2 Parameters below as of 26 Aug 2022 10:00  Patient On (Oxygen Delivery Method): room air            Constitutional: no acute distress, well nourished well developed  Neuro: moving all 4 limbs spontaneously, no facial droop or dysarthria  HEENT: NCAT, anicteric  Neck: no visible lymphadenopathy or goiter  Pulm: no respiratory distress. clear to auscultation bilaterally  Cardiac: extremities appear pink and well-perfused.  regular rhythm and rate, no murmur detected  Abdomen: non-distended  Extremities: no peripheral edema      08-25-22 @ 07:01  -  08-26-22 @ 07:00  --------------------------------------------------------  IN:    IV PiggyBack: 100 mL    Oral Fluid: 780 mL  Total IN: 880 mL    OUT:    Voided (mL): 2050 mL  Total OUT: 2050 mL    Total NET: -1170 mL      08-26-22 @ 07:01  -  08-26-22 @ 10:15  --------------------------------------------------------  IN:  Total IN: 0 mL    OUT:    Voided (mL): 250 mL  Total OUT: 250 mL    Total NET: -250 mL          LABS:                            11.8   8.37  )-----------( 146      ( 26 Aug 2022 05:25 )             33.9                                               08-26    140  |  107  |  18  ----------------------------<  120<H>  4.1   |  25  |  0.5<L>    Ca    9.4      26 Aug 2022 05:25  Mg     2.0     08-26    TPro  5.4<L>  /  Alb  3.6  /  TBili  0.6  /  DBili  x   /  AST  13  /  ALT  13  /  AlkPhos  59  08-25                                                                                           LIVER FUNCTIONS - ( 25 Aug 2022 05:10 )  Alb: 3.6 g/dL / Pro: 5.4 g/dL / ALK PHOS: 59 U/L / ALT: 13 U/L / AST: 13 U/L / GGT: x                                                                                                                                       MEDICATIONS  (STANDING):  chlorhexidine 2% Cloths 1 Application(s) Topical <User Schedule>  methylPREDNISolone sodium succinate IVPB 1000 milliGRAM(s) IV Intermittent daily  NIFEdipine XL 60 milliGRAM(s) Oral daily  polyethylene glycol 3350 17 Gram(s) Oral daily    MEDICATIONS  (PRN):  acetaminophen     Tablet .. 650 milliGRAM(s) Oral every 6 hours PRN Temp greater or equal to 38C (100.4F), Mild Pain (1 - 3)      New X-rays reviewed:       ECHO reviewed    CXR interpreted by me:  remains clear and stable, images and reads from today reviewed.  CT chest done yesterday images reviewed, by my read no signs of reticulations or GGO consistent with ILD due to vasculitis

## 2022-08-26 NOTE — PROGRESS NOTE ADULT - SUBJECTIVE AND OBJECTIVE BOX
Rheumatology follow-up note    Interval history: Pt has been feeling well since the embolization 2 days ago, with only mild soreness at the site of the embolization and constipation, otherwise no complaints.    Physical exam: GEN: Pleasant, AAO woman sitting in chair in NAD  SKIN: No rashes  HEAD: No alopecia  MOUTH: Moist mucous membranes, no oral ulcers, normal oral aperture  PULM: Clear to auscultation b/l  CV: Regular rate and rhythm, no murmurs  MSK:  Shoulders: Full ROM b/l  Elbows: Full ROM b/l, no effusions  Wrists: Full ROM b/l, no effusions  Hands: + Small Jacobo's nodes and CMC squaring, no synovitis  Hips: Full ROM b/l  Knees: No effusions, full ROM b/l  Ankles: no effusions, full ROM b/l  Feet: No effusions, no TTP  EXT: no LE edema b/l, normal nailfold capillaries

## 2022-08-27 LAB
ALBUMIN SERPL ELPH-MCNC: 3.7 G/DL — SIGNIFICANT CHANGE UP (ref 3.5–5.2)
ALP SERPL-CCNC: 60 U/L — SIGNIFICANT CHANGE UP (ref 30–115)
ALT FLD-CCNC: 13 U/L — SIGNIFICANT CHANGE UP (ref 0–41)
ANION GAP SERPL CALC-SCNC: 13 MMOL/L — SIGNIFICANT CHANGE UP (ref 7–14)
AST SERPL-CCNC: 13 U/L — SIGNIFICANT CHANGE UP (ref 0–41)
BASOPHILS # BLD AUTO: 0.02 K/UL — SIGNIFICANT CHANGE UP (ref 0–0.2)
BASOPHILS NFR BLD AUTO: 0.3 % — SIGNIFICANT CHANGE UP (ref 0–1)
BILIRUB SERPL-MCNC: 0.9 MG/DL — SIGNIFICANT CHANGE UP (ref 0.2–1.2)
BUN SERPL-MCNC: 20 MG/DL — SIGNIFICANT CHANGE UP (ref 10–20)
CALCIUM SERPL-MCNC: 9.2 MG/DL — SIGNIFICANT CHANGE UP (ref 8.5–10.1)
CHLORIDE SERPL-SCNC: 108 MMOL/L — SIGNIFICANT CHANGE UP (ref 98–110)
CO2 SERPL-SCNC: 24 MMOL/L — SIGNIFICANT CHANGE UP (ref 17–32)
CREAT SERPL-MCNC: 0.5 MG/DL — LOW (ref 0.7–1.5)
EGFR: 101 ML/MIN/1.73M2 — SIGNIFICANT CHANGE UP
EOSINOPHIL # BLD AUTO: 0.04 K/UL — SIGNIFICANT CHANGE UP (ref 0–0.7)
EOSINOPHIL NFR BLD AUTO: 0.5 % — SIGNIFICANT CHANGE UP (ref 0–8)
GLUCOSE BLDC GLUCOMTR-MCNC: 133 MG/DL — HIGH (ref 70–99)
GLUCOSE BLDC GLUCOMTR-MCNC: 186 MG/DL — HIGH (ref 70–99)
GLUCOSE BLDC GLUCOMTR-MCNC: 88 MG/DL — SIGNIFICANT CHANGE UP (ref 70–99)
GLUCOSE SERPL-MCNC: 92 MG/DL — SIGNIFICANT CHANGE UP (ref 70–99)
HCT VFR BLD CALC: 32.9 % — LOW (ref 37–47)
HGB BLD-MCNC: 11.2 G/DL — LOW (ref 12–16)
HIV 1+2 AB+HIV1 P24 AG SERPL QL IA: SIGNIFICANT CHANGE UP
IMM GRANULOCYTES NFR BLD AUTO: 0.5 % — HIGH (ref 0.1–0.3)
LYMPHOCYTES # BLD AUTO: 1.55 K/UL — SIGNIFICANT CHANGE UP (ref 1.2–3.4)
LYMPHOCYTES # BLD AUTO: 21.3 % — SIGNIFICANT CHANGE UP (ref 20.5–51.1)
MAGNESIUM SERPL-MCNC: 1.9 MG/DL — SIGNIFICANT CHANGE UP (ref 1.8–2.4)
MCHC RBC-ENTMCNC: 28.7 PG — SIGNIFICANT CHANGE UP (ref 27–31)
MCHC RBC-ENTMCNC: 34 G/DL — SIGNIFICANT CHANGE UP (ref 32–37)
MCV RBC AUTO: 84.4 FL — SIGNIFICANT CHANGE UP (ref 81–99)
MONOCYTES # BLD AUTO: 0.62 K/UL — HIGH (ref 0.1–0.6)
MONOCYTES NFR BLD AUTO: 8.5 % — SIGNIFICANT CHANGE UP (ref 1.7–9.3)
NEUTROPHILS # BLD AUTO: 5.02 K/UL — SIGNIFICANT CHANGE UP (ref 1.4–6.5)
NEUTROPHILS NFR BLD AUTO: 68.9 % — SIGNIFICANT CHANGE UP (ref 42.2–75.2)
NRBC # BLD: 0 /100 WBCS — SIGNIFICANT CHANGE UP (ref 0–0)
PLATELET # BLD AUTO: 132 K/UL — SIGNIFICANT CHANGE UP (ref 130–400)
POTASSIUM SERPL-MCNC: 3.8 MMOL/L — SIGNIFICANT CHANGE UP (ref 3.5–5)
POTASSIUM SERPL-SCNC: 3.8 MMOL/L — SIGNIFICANT CHANGE UP (ref 3.5–5)
PROT SERPL-MCNC: 5.6 G/DL — LOW (ref 6–8)
RBC # BLD: 3.9 M/UL — LOW (ref 4.2–5.4)
RBC # FLD: 14.9 % — HIGH (ref 11.5–14.5)
RHEUMATOID FACT SERPL-ACNC: <10 IU/ML — SIGNIFICANT CHANGE UP (ref 0–13)
SODIUM SERPL-SCNC: 145 MMOL/L — SIGNIFICANT CHANGE UP (ref 135–146)
WBC # BLD: 7.29 K/UL — SIGNIFICANT CHANGE UP (ref 4.8–10.8)
WBC # FLD AUTO: 7.29 K/UL — SIGNIFICANT CHANGE UP (ref 4.8–10.8)

## 2022-08-27 PROCEDURE — 99233 SBSQ HOSP IP/OBS HIGH 50: CPT

## 2022-08-27 RX ORDER — DIAZEPAM 5 MG
2 TABLET ORAL ONCE
Refills: 0 | Status: DISCONTINUED | OUTPATIENT
Start: 2022-08-27 | End: 2022-08-27

## 2022-08-27 RX ORDER — NIFEDIPINE 30 MG
10 TABLET, EXTENDED RELEASE 24 HR ORAL ONCE
Refills: 0 | Status: COMPLETED | OUTPATIENT
Start: 2022-08-27 | End: 2022-08-27

## 2022-08-27 RX ORDER — NIFEDIPINE 30 MG
90 TABLET, EXTENDED RELEASE 24 HR ORAL DAILY
Refills: 0 | Status: DISCONTINUED | OUTPATIENT
Start: 2022-08-27 | End: 2022-08-28

## 2022-08-27 RX ADMIN — Medication 10 MILLIGRAM(S): at 23:24

## 2022-08-27 RX ADMIN — Medication 1 TABLET(S): at 12:20

## 2022-08-27 RX ADMIN — Medication 116 MILLIGRAM(S): at 06:06

## 2022-08-27 RX ADMIN — Medication 60 MILLIGRAM(S): at 06:05

## 2022-08-27 RX ADMIN — Medication 60 MILLIGRAM(S): at 08:24

## 2022-08-27 RX ADMIN — CHLORHEXIDINE GLUCONATE 1 APPLICATION(S): 213 SOLUTION TOPICAL at 06:05

## 2022-08-27 RX ADMIN — POLYETHYLENE GLYCOL 3350 17 GRAM(S): 17 POWDER, FOR SOLUTION ORAL at 12:27

## 2022-08-27 RX ADMIN — Medication 2 MILLIGRAM(S): at 23:06

## 2022-08-27 NOTE — PROGRESS NOTE ADULT - SUBJECTIVE AND OBJECTIVE BOX
Patient is a 67y old  Female who presents with a chief complaint of Hemoperitoneum (27 Aug 2022 07:39)      Over Night Events:  Patient seen and examined.   stable h/h stable pending transfer     ROS:  See HPI    PHYSICAL EXAM    ICU Vital Signs Last 24 Hrs  T(C): 36.3 (26 Aug 2022 12:00), Max: 36.3 (26 Aug 2022 12:00)  T(F): 97.4 (26 Aug 2022 12:00), Max: 97.4 (26 Aug 2022 12:00)  HR: 57 (27 Aug 2022 06:00) (53 - 91)  BP: 160/77 (27 Aug 2022 06:00) (127/65 - 169/77)  BP(mean): 110 (27 Aug 2022 06:00) (90 - 110)  ABP: 126/72 (26 Aug 2022 16:00) (103/49 - 142/53)  ABP(mean): 82 (26 Aug 2022 15:00) (69 - 90)  RR: 17 (27 Aug 2022 06:00) (14 - 33)  SpO2: 99% (27 Aug 2022 06:00) (92% - 99%)    O2 Parameters below as of 27 Aug 2022 06:00  Patient On (Oxygen Delivery Method): room air            General: AOx3  HEENT:      celestine          Lymph Nodes: NO cervical LN   Lungs: Bilateral BS  Cardiovascular: Regular   Abdomen: Soft, Positive BS  Extremities: No clubbing   Skin: warm   Neurological: no focal deficit   Musculoskeletal: move all ext     I&O's Detail    26 Aug 2022 07:01  -  27 Aug 2022 07:00  --------------------------------------------------------  IN:    Oral Fluid: 390 mL  Total IN: 390 mL    OUT:    Voided (mL): 1600 mL  Total OUT: 1600 mL    Total NET: -1210 mL          LABS:                          11.2   7.29  )-----------( 132      ( 27 Aug 2022 04:37 )             32.9         27 Aug 2022 04:37    145    |  108    |  20     ----------------------------<  92     3.8     |  24     |  0.5      Ca    9.2        27 Aug 2022 04:37  Mg     1.9       27 Aug 2022 04:37    TPro  5.6    /  Alb  3.7    /  TBili  0.9    /  DBili  x      /  AST  13     /  ALT  13     /  AlkPhos  60     27 Aug 2022 04:37  Amylase x     lipase x                                                                                                                                                                                                                                         MEDICATIONS  (STANDING):  chlorhexidine 2% Cloths 1 Application(s) Topical <User Schedule>  NIFEdipine XL 60 milliGRAM(s) Oral daily  polyethylene glycol 3350 17 Gram(s) Oral daily  predniSONE   Tablet 60 milliGRAM(s) Oral daily  trimethoprim   80 mG/sulfamethoxazole 400 mG 1 Tablet(s) Oral daily    MEDICATIONS  (PRN):  acetaminophen     Tablet .. 650 milliGRAM(s) Oral every 6 hours PRN Temp greater or equal to 38C (100.4F), Mild Pain (1 - 3)          Xrays:  TLC:  OG:  ET tube:                                                                                       ECHO:  [facility] ICU:

## 2022-08-27 NOTE — PROVIDER CONTACT NOTE (OTHER) - ACTION/TREATMENT ORDERED:
MD De La Cruz made aware. MD came to see pt. MD prescribed procardia IR and valium. recheck bp in one hour after admin.

## 2022-08-27 NOTE — PROGRESS NOTE ADULT - ASSESSMENT
KEN V  Impression    Medium vessel vasculitis  Spontaneous non-traumtic hemoperitoneum  Celiac Artery Rupture  SMA pseudoaneurysms  Hemorrhagic Shock (resolved)    Plan:      CNS: AAO, no concerns    CARDIOVASCULAR  Hemorrhagic shock resolved s/p MTP  Monitor for further bleeding  procardia 60 mg cntinue     PULMONARY  HOB @ 45 degrees, aspiration precautions  RA  CT chest does not demonstrate any signs of ILD, just bibasilar atelectasis and trace effusions    GASTROINTESTINAL  GI prophylaxis: not indicated  Feeds: diet  BM: increase bowel regimen    GENITOURINARY/RENAL  daily chem    INFECTIOUS  no acute concern    HEMATOLOGIC  DVT ppx:  reasses fabiana for heparin SQ if h/h remian stable   sequential for now   pulse-dose steroids x3 days, follow-up rheumatologic labs  rheumatology panel given vascular abnormalities  consult rheumatology for recs      ENDOCRINE  Follow up FS.  Insulin protocol as needed.  BG goal 140-180    MSK/DERM  PT/OT, OOB       CODE STATUS: FULL  DISPO:  floor

## 2022-08-27 NOTE — PROGRESS NOTE ADULT - SUBJECTIVE AND OBJECTIVE BOX
Patient is a 67y old  Female who presents with a chief complaint of Hemoperitoneum (26 Aug 2022 15:38)    HPI:  66yo F w/ no significant pmhx presented to ED with left flank pain that started around 9pm last night. Patient was relaxing/watching TV when she suddenly felt this and presented to the ED. Initially patient was hypertensive and there was an initial concern for possible kidney stone. However CT a/p showed hemoperitoneum with active extravesation of celiac artery. Patient denied any past trauma or falls. Denies being on any medications including blood thinners. Denies all other complaints except her left flank/back pain.     Patient then became hemodynamically unstable BP 79/40 and became dizzy and diaphoretic. Code fusion was called. Patient received 4 total prbcs and will get 1u of ffp and platelets. IR was called and are planning for emergent embolization. Vascular also on board.      labs: hgb 13.7 -> 12.3, coags wnl, lactate 2.5 -> 1.4  imaging:   - CT a/p IV con: Large volume dense ascites compatible with hemoperitoneum. Active contrast extravasation is seen in the subhepatic region, likely arising from a distal celiac branch vessel in the hepatic territory. A possible second smaller region of active extravasation is seen adjacent to a vessel in the region of the pancreatic tail (4-48, 4-43). A dual source of bleeding is not excluded, and a CTA may be considered for further evaluation.  - CT a/p angio: Mildly increased large volume hemoperitoneum with prominent active extravasation in the subhepatic region originating from a distal celiac branch in the hepatic territory. A second focus of extravasation is not seen on the current exam.   (23 Aug 2022 06:30)       INTERVAL HPI/OVERNIGHT EVENTS:   No overnight events   Afebrile, hemodynamically stable     Subjective:    ICU Vital Signs Last 24 Hrs  T(C): 36.3 (26 Aug 2022 12:00), Max: 36.3 (26 Aug 2022 12:00)  T(F): 97.4 (26 Aug 2022 12:00), Max: 97.4 (26 Aug 2022 12:00)  HR: 57 (27 Aug 2022 06:00) (53 - 91)  BP: 160/77 (27 Aug 2022 06:00) (127/65 - 169/77)  BP(mean): 110 (27 Aug 2022 06:00) (90 - 110)  ABP: 126/72 (26 Aug 2022 16:00) (103/49 - 142/53)  ABP(mean): 82 (26 Aug 2022 15:00) (69 - 90)  RR: 17 (27 Aug 2022 06:00) (14 - 33)  SpO2: 99% (27 Aug 2022 06:00) (92% - 99%)    O2 Parameters below as of 27 Aug 2022 06:00  Patient On (Oxygen Delivery Method): room air          I&O's Summary    26 Aug 2022 07:01  -  27 Aug 2022 07:00  --------------------------------------------------------  IN: 390 mL / OUT: 1600 mL / NET: -1210 mL          Daily     Daily     Adult Advanced Hemodynamics Last 24 Hrs  CVP(mm Hg): --  CVP(cm H2O): --  CO: --  CI: --  PA: --  PA(mean): --  PCWP: --  SVR: --  SVRI: --  PVR: --  PVRI: --    EKG/Telemetry Events:    MEDICATIONS  (STANDING):  chlorhexidine 2% Cloths 1 Application(s) Topical <User Schedule>  NIFEdipine XL 60 milliGRAM(s) Oral daily  polyethylene glycol 3350 17 Gram(s) Oral daily  predniSONE   Tablet 60 milliGRAM(s) Oral daily  trimethoprim   80 mG/sulfamethoxazole 400 mG 1 Tablet(s) Oral daily    MEDICATIONS  (PRN):  acetaminophen     Tablet .. 650 milliGRAM(s) Oral every 6 hours PRN Temp greater or equal to 38C (100.4F), Mild Pain (1 - 3)      PHYSICAL EXAM:  GENERAL:   HEAD:  Atraumatic, Normocephalic  EYES: conjunctiva and sclera clear  NECK: Supple  NERVOUS SYSTEM:  Alert & Awake.   CHEST/LUNG: B/L good air entry; No rales, rhonchi, or wheezing  HEART: Regular rate and rhythm; No murmurs  ABDOMEN: Soft, Nontender, Nondistended; Bowel sounds present  EXTREMITIES:  No clubbing, cyanosis, or edema  SKIN: No rashes or lesions    LABS:                        11.2   7.29  )-----------( 132      ( 27 Aug 2022 04:37 )             32.9     08-27    145  |  108  |  20  ----------------------------<  92  3.8   |  24  |  0.5<L>    Ca    9.2      27 Aug 2022 04:37  Mg     1.9     08-27    TPro  5.6<L>  /  Alb  3.7  /  TBili  0.9  /  DBili  x   /  AST  13  /  ALT  13  /  AlkPhos  60  08-27    LIVER FUNCTIONS - ( 27 Aug 2022 04:37 )  Alb: 3.7 g/dL / Pro: 5.6 g/dL / ALK PHOS: 60 U/L / ALT: 13 U/L / AST: 13 U/L / GGT: x             CAPILLARY BLOOD GLUCOSE      POCT Blood Glucose.: 88 mg/dL (27 Aug 2022 06:08)  POCT Blood Glucose.: 114 mg/dL (26 Aug 2022 23:44)  POCT Blood Glucose.: 190 mg/dL (26 Aug 2022 17:40)  POCT Blood Glucose.: 201 mg/dL (26 Aug 2022 11:03)                  RADIOLOGY & ADDITIONAL TESTS:  CXR:        Care Discussed with Consultants/Other Providers [ x] YES  [ ] NO

## 2022-08-28 LAB
ANION GAP SERPL CALC-SCNC: 13 MMOL/L — SIGNIFICANT CHANGE UP (ref 7–14)
BUN SERPL-MCNC: 22 MG/DL — HIGH (ref 10–20)
CALCIUM SERPL-MCNC: 9.7 MG/DL — SIGNIFICANT CHANGE UP (ref 8.5–10.1)
CHLORIDE SERPL-SCNC: 103 MMOL/L — SIGNIFICANT CHANGE UP (ref 98–110)
CO2 SERPL-SCNC: 22 MMOL/L — SIGNIFICANT CHANGE UP (ref 17–32)
CREAT SERPL-MCNC: 0.6 MG/DL — LOW (ref 0.7–1.5)
DSDNA AB FLD-ACNC: <0.2 AI — SIGNIFICANT CHANGE UP
EGFR: 97 ML/MIN/1.73M2 — SIGNIFICANT CHANGE UP
ENA SS-A AB FLD IA-ACNC: <0.2 AI — SIGNIFICANT CHANGE UP
GLUCOSE BLDC GLUCOMTR-MCNC: 124 MG/DL — HIGH (ref 70–99)
GLUCOSE BLDC GLUCOMTR-MCNC: 138 MG/DL — HIGH (ref 70–99)
GLUCOSE BLDC GLUCOMTR-MCNC: 147 MG/DL — HIGH (ref 70–99)
GLUCOSE SERPL-MCNC: 120 MG/DL — HIGH (ref 70–99)
HCT VFR BLD CALC: 39.5 % — SIGNIFICANT CHANGE UP (ref 37–47)
HGB BLD-MCNC: 13.5 G/DL — SIGNIFICANT CHANGE UP (ref 12–16)
MCHC RBC-ENTMCNC: 28.8 PG — SIGNIFICANT CHANGE UP (ref 27–31)
MCHC RBC-ENTMCNC: 34.2 G/DL — SIGNIFICANT CHANGE UP (ref 32–37)
MCV RBC AUTO: 84.4 FL — SIGNIFICANT CHANGE UP (ref 81–99)
NRBC # BLD: 0 /100 WBCS — SIGNIFICANT CHANGE UP (ref 0–0)
PLATELET # BLD AUTO: 179 K/UL — SIGNIFICANT CHANGE UP (ref 130–400)
POTASSIUM SERPL-MCNC: 4 MMOL/L — SIGNIFICANT CHANGE UP (ref 3.5–5)
POTASSIUM SERPL-SCNC: 4 MMOL/L — SIGNIFICANT CHANGE UP (ref 3.5–5)
RBC # BLD: 4.68 M/UL — SIGNIFICANT CHANGE UP (ref 4.2–5.4)
RBC # FLD: 15 % — HIGH (ref 11.5–14.5)
SODIUM SERPL-SCNC: 138 MMOL/L — SIGNIFICANT CHANGE UP (ref 135–146)
WBC # BLD: 9.79 K/UL — SIGNIFICANT CHANGE UP (ref 4.8–10.8)
WBC # FLD AUTO: 9.79 K/UL — SIGNIFICANT CHANGE UP (ref 4.8–10.8)

## 2022-08-28 PROCEDURE — 71275 CT ANGIOGRAPHY CHEST: CPT | Mod: 26

## 2022-08-28 PROCEDURE — 99233 SBSQ HOSP IP/OBS HIGH 50: CPT

## 2022-08-28 RX ORDER — NIFEDIPINE 30 MG
30 TABLET, EXTENDED RELEASE 24 HR ORAL ONCE
Refills: 0 | Status: COMPLETED | OUTPATIENT
Start: 2022-08-28 | End: 2022-08-28

## 2022-08-28 RX ORDER — NIFEDIPINE 30 MG
60 TABLET, EXTENDED RELEASE 24 HR ORAL
Refills: 0 | Status: DISCONTINUED | OUTPATIENT
Start: 2022-08-29 | End: 2022-09-01

## 2022-08-28 RX ADMIN — Medication 30 MILLIGRAM(S): at 21:18

## 2022-08-28 RX ADMIN — Medication 60 MILLIGRAM(S): at 05:37

## 2022-08-28 RX ADMIN — Medication 1 TABLET(S): at 11:50

## 2022-08-28 RX ADMIN — POLYETHYLENE GLYCOL 3350 17 GRAM(S): 17 POWDER, FOR SOLUTION ORAL at 11:51

## 2022-08-28 RX ADMIN — Medication 90 MILLIGRAM(S): at 05:36

## 2022-08-28 NOTE — PROGRESS NOTE ADULT - ATTENDING COMMENTS
Pt is a 67y woman without a significant medical history who presented initially with abdominal and flank pain, found to have spontaneous nontraumatic hemoperitoneum with CTA on admission showing active extravasation from celiac branch. S/p IR Celiac Arteriogram demonstrating pseudoaneurysms in multiple small to medium-sized vessels, including a pseudoaneurysm of the right gastric artery which was likely the actively extravasating vessel, and prophylactic coil embolization was performed.     # Spontaneous non-traumatic hemoperitoneum 2/2 Right Gastric Artery Pseudoaneurysm with extravasation  # Medium vessel vasculitis   # Multiple pseudoaneurysms  # Hemorrhagic Shock (resolved)  # Acute Blood Loss Anemia   - s/p IR guided coil embolization of R Gastric Pseudoaneurysm   - s/p 5  - Pulse-dose steroids x3 days (today is day 3)  - F/u rheumatologic labs  - C/s rheum for SMA pseudoaneurysms i/s/o medium vessel vasculitis, appreciate recs    #HTN   - BP poorly controlled, possibly related to steroids/ stress/ anxiety   - will try split dosing for nifedipine. Will give 30mg PO Nifedipine this evening, and then switch to PO 60mg BID and monitor for BP control.     # Misc  - DVT Prophylaxis: Compression Device Sequential  - GI Prophylaxis: Not indicated  - Diet: Diet, DASH/TLC  - Code Status: Full Pt is a 67y woman without a significant medical history who presented initially with abdominal and flank pain, found to have spontaneous nontraumatic hemoperitoneum with CTA on admission showing active extravasation from celiac branch. S/p IR Celiac Arteriogram demonstrating pseudoaneurysms in multiple small to medium-sized vessels, including a pseudoaneurysm of the right gastric artery which was likely the actively extravasating vessel, and prophylactic coil embolization was performed.     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 Nichole, mildly elevated CRP, mildly decreased C3, indeterminate atypical ANCA  - As per Rheum Attending Dr. Nichole, no need for Rituxin, continue Prednisone 60mg until follow up in clinic, and decrease dose q 2 weeks   - Family is requesting a screening CTA Chest to eval for any other pseudoaneurysms/ active manifestations of vasculitus. CTA H/N completed and unremarkable.     #HTN   - BP poorly controlled, possibly related to steroids/ stress/ anxiety   - will try split dosing for nifedipine. Will give 30mg PO Nifedipine this evening, and then switch to PO 60mg BID and monitor for BP control.     - DVT Prophylaxis: hold AC given hemmorhagic shock on admission, pt ambulates well around the unit.       #Progress Note Handoff:  Pending (specify):  CTA Chest and then likely dc tomorrow   Family discussion: d/w daughter at bedside ( works in ENT dept )   Disposition: Home__x_/SNF___/Other________/Unknown at this time________        Gracia Vang, DO       Total time spent to complete patient's bedside assessment, review medical chart, discuss medical plan of care with covering medical team was more than 35 minutes  with >50% of time spent face to face with patient, discussion with patient/family and/or coordination of care

## 2022-08-28 NOTE — PROGRESS NOTE ADULT - SUBJECTIVE AND OBJECTIVE BOX
Hospital Day:  5d    Subjective:    Patient is a 67y old  Female who presents with a chief complaint of Hemoperitoneum. Overnight patient BP went up to 201/91. Patient was asymptomatic. Patient was given Nifedipine 10 and Valium. Patient BP went down to systolic 155 after an hour. Patient seen sleeping in bed.       Admitted to medicine for a primary diagnosis of hemoperitoneum     Past Medical Hx:   No pertinent past medical history      Past Sx:  No significant past surgical history      Allergies:  No Known Allergies    Current Meds:   Standng Meds:  chlorhexidine 2% Cloths 1 Application(s) Topical <User Schedule>  NIFEdipine XL 90 milliGRAM(s) Oral daily  polyethylene glycol 3350 17 Gram(s) Oral daily  predniSONE   Tablet 60 milliGRAM(s) Oral daily  trimethoprim   80 mG/sulfamethoxazole 400 mG 1 Tablet(s) Oral daily    PRN Meds:  acetaminophen     Tablet .. 650 milliGRAM(s) Oral every 6 hours PRN Temp greater or equal to 38C (100.4F), Mild Pain (1 - 3)    HOME MEDICATIONS:      Vital Signs:   T(F): 97.4 (08-27-22 @ 20:00), Max: 97.6 (08-27-22 @ 08:00)  HR: 77 (08-28-22 @ 00:35) (53 - 97)  BP: 159/78 (08-28-22 @ 00:35) (120/70 - 201/91)  RR: 18 (08-27-22 @ 20:00) (14 - 20)  SpO2: 99% (08-27-22 @ 12:00) (96% - 100%)      08-26-22 @ 07:01  -  08-27-22 @ 07:00  --------------------------------------------------------  IN: 390 mL / OUT: 1600 mL / NET: -1210 mL        Physical Exam:   GENERAL: NAD  HEENT: NCAT  CHEST/LUNG: CTAB  HEART: Regular rate and rhythm; s1 s2 appreciated, No murmurs, rubs, or gallops  ABDOMEN: Soft, Nontender, Nondistended; Bowel sounds present  EXTREMITIES: No LE edema b/l  SKIN: no rashes, no new lesions  NERVOUS SYSTEM:  Alert & Oriented X3  LINES/CATHETERS:        Labs:                         11.2   7.29  )-----------( 132      ( 27 Aug 2022 04:37 )             32.9     Neutophil% 68.9, Lymphocyte% 21.3, Monocyte% 8.5, Bands% 0.5 08-27-22 @ 04:37    27 Aug 2022 04:37    145    |  108    |  20     ----------------------------<  92     3.8     |  24     |  0.5      Ca    9.2        27 Aug 2022 04:37  Mg     1.9       27 Aug 2022 04:37    TPro  5.6    /  Alb  3.7    /  TBili  0.9    /  DBili  x      /  AST  13     /  ALT  13     /  AlkPhos  60     27 Aug 2022 04:37                             Hospital Day:  5d    Subjective:    Patient is a 67y old  Female who presents with a chief complaint of Hemoperitoneum. Overnight patient BP went up to 201/91. Patient was asymptomatic. Patient was given Nifedipine 10 and Valium. Patient BP went down to systolic 155 after an hour. Patient seen sleeping in bed.       Admitted to medicine for a primary diagnosis of hemoperitoneum     Past Medical Hx:   No pertinent past medical history      Past Sx:  No significant past surgical history      Allergies:  No Known Allergies    Current Meds:   Standng Meds:  chlorhexidine 2% Cloths 1 Application(s) Topical <User Schedule>  NIFEdipine XL 90 milliGRAM(s) Oral daily  polyethylene glycol 3350 17 Gram(s) Oral daily  predniSONE   Tablet 60 milliGRAM(s) Oral daily  trimethoprim   80 mG/sulfamethoxazole 400 mG 1 Tablet(s) Oral daily    PRN Meds:  acetaminophen     Tablet .. 650 milliGRAM(s) Oral every 6 hours PRN Temp greater or equal to 38C (100.4F), Mild Pain (1 - 3)    HOME MEDICATIONS:      Vital Signs:   T(F): 97.4 (08-27-22 @ 20:00), Max: 97.6 (08-27-22 @ 08:00)  HR: 77 (08-28-22 @ 00:35) (53 - 97)  BP: 159/78 (08-28-22 @ 00:35) (120/70 - 201/91)  RR: 18 (08-27-22 @ 20:00) (14 - 20)  SpO2: 99% (08-27-22 @ 12:00) (96% - 100%)      08-26-22 @ 07:01  -  08-27-22 @ 07:00  --------------------------------------------------------  IN: 390 mL / OUT: 1600 mL / NET: -1210 mL        Physical Exam:   GENERAL: NAD  HEENT: NCAT  CHEST/LUNG: CTAB  HEART: Regular rate and rhythm; s1 s2 appreciated, No murmurs, rubs, or gallops  ABDOMEN: Soft, Nontender, Nondistended; Bowel sounds present  EXTREMITIES: No LE edema b/l  SKIN: no rashes, no new lesions  NERVOUS SYSTEM:  Alert & Oriented X3        Labs:                         11.2   7.29  )-----------( 132      ( 27 Aug 2022 04:37 )             32.9     Neutophil% 68.9, Lymphocyte% 21.3, Monocyte% 8.5, Bands% 0.5 08-27-22 @ 04:37    27 Aug 2022 04:37    145    |  108    |  20     ----------------------------<  92     3.8     |  24     |  0.5      Ca    9.2        27 Aug 2022 04:37  Mg     1.9       27 Aug 2022 04:37    TPro  5.6    /  Alb  3.7    /  TBili  0.9    /  DBili  x      /  AST  13     /  ALT  13     /  AlkPhos  60     27 Aug 2022 04:37

## 2022-08-28 NOTE — PROGRESS NOTE ADULT - ASSESSMENT
KEN ROBERSON is a 67y woman without a significant medical history who presented initially with abdominal and flank pain, and is now in the critical care unit for spontaneous hemoperitoneum.  She underwent coil placement with IR and is now being monitored post-procedurally.     # Medium vessel vasculitis  # Spontaneous non-traumatic hemoperitoneum  # Celiac Artery Rupture  # SMA pseudoaneurysms  # Hemorrhagic Shock (resolved)  - Increase nifedipine for HTN  - Pulse-dose steroids x3 days (today is day 3)  - F/u rheumatologic labs  - C/s rheum for SMA pseudoaneurysms i/s/o medium vessel vasculitis, appreciate recs    #HTN   - Nifedipine raised from 60 to 90 given elevated trend of BP on nifedipine 60    # Misc  - DVT Prophylaxis: Compression Device Sequential  - GI Prophylaxis: Not indicated  - Diet: Diet, DASH/TLC  - Code Status: Full

## 2022-08-29 LAB
ANION GAP SERPL CALC-SCNC: 15 MMOL/L — HIGH (ref 7–14)
BLD GP AB SCN SERPL QL: SIGNIFICANT CHANGE UP
BUN SERPL-MCNC: 19 MG/DL — SIGNIFICANT CHANGE UP (ref 10–20)
CALCIUM SERPL-MCNC: 9.5 MG/DL — SIGNIFICANT CHANGE UP (ref 8.5–10.1)
CCP IGG SERPL-ACNC: 20 UNITS — HIGH
CHLORIDE SERPL-SCNC: 103 MMOL/L — SIGNIFICANT CHANGE UP (ref 98–110)
CO2 SERPL-SCNC: 24 MMOL/L — SIGNIFICANT CHANGE UP (ref 17–32)
CREAT SERPL-MCNC: 0.6 MG/DL — LOW (ref 0.7–1.5)
CRYOGLOB SERPL-MCNC: NEGATIVE — SIGNIFICANT CHANGE UP
EGFR: 97 ML/MIN/1.73M2 — SIGNIFICANT CHANGE UP
GLUCOSE BLDC GLUCOMTR-MCNC: 101 MG/DL — HIGH (ref 70–99)
GLUCOSE BLDC GLUCOMTR-MCNC: 119 MG/DL — HIGH (ref 70–99)
GLUCOSE BLDC GLUCOMTR-MCNC: 186 MG/DL — HIGH (ref 70–99)
GLUCOSE SERPL-MCNC: 194 MG/DL — HIGH (ref 70–99)
HCT VFR BLD CALC: 39.8 % — SIGNIFICANT CHANGE UP (ref 37–47)
HGB BLD-MCNC: 13.6 G/DL — SIGNIFICANT CHANGE UP (ref 12–16)
IGA FLD-MCNC: 84 MG/DL — SIGNIFICANT CHANGE UP (ref 84–499)
MAGNESIUM SERPL-MCNC: 2 MG/DL — SIGNIFICANT CHANGE UP (ref 1.8–2.4)
MCHC RBC-ENTMCNC: 29.2 PG — SIGNIFICANT CHANGE UP (ref 27–31)
MCHC RBC-ENTMCNC: 34.2 G/DL — SIGNIFICANT CHANGE UP (ref 32–37)
MCV RBC AUTO: 85.6 FL — SIGNIFICANT CHANGE UP (ref 81–99)
NRBC # BLD: 0 /100 WBCS — SIGNIFICANT CHANGE UP (ref 0–0)
PLATELET # BLD AUTO: 176 K/UL — SIGNIFICANT CHANGE UP (ref 130–400)
POTASSIUM SERPL-MCNC: 3.7 MMOL/L — SIGNIFICANT CHANGE UP (ref 3.5–5)
POTASSIUM SERPL-SCNC: 3.7 MMOL/L — SIGNIFICANT CHANGE UP (ref 3.5–5)
RBC # BLD: 4.65 M/UL — SIGNIFICANT CHANGE UP (ref 4.2–5.4)
RBC # FLD: 15.3 % — HIGH (ref 11.5–14.5)
RF+CCP IGG SER-IMP: ABNORMAL
SARS-COV-2 RNA SPEC QL NAA+PROBE: SIGNIFICANT CHANGE UP
SODIUM SERPL-SCNC: 142 MMOL/L — SIGNIFICANT CHANGE UP (ref 135–146)
WBC # BLD: 8.74 K/UL — SIGNIFICANT CHANGE UP (ref 4.8–10.8)
WBC # FLD AUTO: 8.74 K/UL — SIGNIFICANT CHANGE UP (ref 4.8–10.8)

## 2022-08-29 PROCEDURE — 93010 ELECTROCARDIOGRAM REPORT: CPT

## 2022-08-29 PROCEDURE — 99233 SBSQ HOSP IP/OBS HIGH 50: CPT

## 2022-08-29 RX ADMIN — Medication 60 MILLIGRAM(S): at 05:04

## 2022-08-29 RX ADMIN — Medication 60 MILLIGRAM(S): at 17:12

## 2022-08-29 RX ADMIN — CHLORHEXIDINE GLUCONATE 1 APPLICATION(S): 213 SOLUTION TOPICAL at 05:06

## 2022-08-29 RX ADMIN — Medication 1 TABLET(S): at 12:16

## 2022-08-29 NOTE — DIETITIAN INITIAL EVALUATION ADULT - NS FNS REASON FOR WEIGHT CHANG
some difficulty with chewing, also patient with restrictive diet - only 1 meal/day/decreased po intake/other (specify)

## 2022-08-29 NOTE — PROGRESS NOTE ADULT - ATTENDING COMMENTS
Pt is a 67y woman without a significant medical history who presented initially with abdominal and flank pain, found to have spontaneous nontraumatic hemoperitoneum with CTA on admission showing active extravasation from celiac branch. S/p IR Celiac Arteriogram demonstrating pseudoaneurysms in multiple small to medium-sized vessels, including a pseudoaneurysm of the right gastric artery which was likely the actively extravasating vessel, and prophylactic coil embolization was performed.     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 Nichole, mildly elevated CRP, mildly decreased C3, indeterminate atypical ANCA  - As per Rheum Attending Dr. Nichole, no need for Rituxin, continue Prednisone 60mg until follow up in clinic, and decrease dose q 2 weeks     #Multiple Segmental/Subsegmental PE   - noted on screening CTA Chest , pt is clinically stable and completely asymptomatic   - as per Rheumatologist, case studies do document PE's in the setting of active vasculitic processes   - as per discussion with IR and Rheumatology, plan for either AC and IVC filter in the future if she fails, or IVC filter now steroid trial to decrease active vasculitis and then AC in the future.   - Will discuss both options with pt and daughter and decide on course of action     #HTN   - BP poorly controlled, possibly related to steroids/ stress/ anxiety   - will try split dosing for nifedipine, PO 60mg BID and monitor for BP control- improved today     - DVT Prophylaxis: hold AC given hemmorhagic shock on admission, pt ambulates well around the unit.       #Progress Note Handoff:  Pending (specify): pending decision by pt and family regarding IVC vs AC on dc.   Family discussion: d/w daughter at bedside ( works in ENT dept )   Disposition: Home__x_/SNF___/Other________/Unknown at this time________        Gracia Vang, DO       Total time spent to complete patient's bedside assessment, review medical chart, discuss medical plan of care with covering medical team was more than 35 minutes  with >50% of time spent face to face with patient, discussion with patient/family and/or coordination of care.

## 2022-08-29 NOTE — PROGRESS NOTE ADULT - ASSESSMENT
67y woman without a significant medical history who presented initially with abdominal and flank pain admitted for spontaneous hemoperitoneum 2/2 gastric artery pseudoaneurysm bleed.     # Medium vessel vasculitis  # Spontaneous non-traumatic hemoperitoneum  # Celiac Artery Rupture  # SMA pseudoaneurysms  # Hemorrhagic Shock (resolved)  - s/p code fusion in the ED - 5u prbc, 1u plt, 1u plasma  - Pulse-dose prednisone 60mg POD  - CTA chest - extensive b/l segmental and subsegmental PE  - IR following no acute intervention at this time  - Rheumatology consulted - Dr. Nichole following   - CRP 13.9, decreased C3, intermediate atypical ANCA  - rituxin not needed, c/w prednisone until f/u clinic appointment, decrease q2 weeks  - C/s rheum for SMA pseudoaneurysms i/s/o medium vessel vasculitis, appreciate recs  - Discuss with attending and family for possibility of starting anticoagulation for acute PE.    #HTN   - c/w nifedipine 60mg    #Misc  - DVT Prophylaxis: SCD  - Diet: dash/tlc  - GI Prophylaxis: not indicated   - Activity: IAT   - IV Fluids: none  - Code Status: full

## 2022-08-29 NOTE — DIETITIAN INITIAL EVALUATION ADULT - PERTINENT LABORATORY DATA
08-29    142  |  103  |  19  ----------------------------<  194<H>  3.7   |  24  |  0.6<L>    Ca    9.5      29 Aug 2022 08:43  Mg     2.0     08-29    POCT Blood Glucose.: 186 mg/dL (08-29-22 @ 10:55)

## 2022-08-29 NOTE — DIETITIAN NUTRITION RISK NOTIFICATION - TREATMENT: THE FOLLOWING DIET HAS BEEN RECOMMENDED
Diet, DASH/TLC:   Sodium & Cholesterol Restricted  Free Water Flush Instructions:  PLEASE HAVE FOOD CUT UP BEFORE SERVING AT ALL MEALS***  Supplement Feeding Modality:  Oral  Ensure Max Cans or Servings Per Day:  1       Frequency:  Three Times a day (08-29-22 @ 16:14) [Pending Verification By Attending]  Diet, DASH/TLC:   Sodium & Cholesterol Restricted (08-24-22 @ 08:58) [Active]    Patient with severe malnutrition in the context of social/environmental circumstances as evidenced by energy intake </=50% x >/= 1 month and >5% weight loss x 1 month

## 2022-08-29 NOTE — DIETITIAN INITIAL EVALUATION ADULT - COLLABORATION WITH OTHER PROVIDERS
Interventions: meals and snacks, medical food supplements, coordination of care  Monitoring/Evaluation: diet order, energy intake, labs (see above), weight, skin status, NFPF

## 2022-08-29 NOTE — DIETITIAN INITIAL EVALUATION ADULT - ADD RECOMMEND
1) Continue current diet order - please have foods cut up at meals for ease of patient eating  2) Recommend Ensure Max supplement 3x/day (150 kcal, 30 gm Protein each)  3) Obtain new weight when possible  4) Elevated BG likely related to prednisone use - continue to monitor BG    Patient is at high nutrition risk, RD

## 2022-08-29 NOTE — DIETITIAN INITIAL EVALUATION ADULT - OTHER INFO
Patient is 66 yo woman without significant medical hx. Presented with abdominal and flank pain. Admitted for spontaneous hemoperitoneum 2/2 gastric artery pseudoaneurysm bleed.  #Medium vessel vasculitis  #Spontaneous non-traumatic hemoperitoneum  #Celiac Artery Rupture  #SMA pseudoaneurysms  #Hemorrhagic Shock (resolved)  #HTN

## 2022-08-29 NOTE — DIETITIAN INITIAL EVALUATION ADULT - ORAL INTAKE PTA/DIET HISTORY
PTA patient reports only eating one meal/day - dinner. She was maintaining a restrictive diet for weight loss/maintenance. She was also taking multivitamin, green tea extract, calcium and chromium for supplementation. She had some dental work done ~1 month ago. She was drinking Ensure shakes at that time as she was experiencing pain and soreness after her dental work.  - 145 lbs (x1 month ago). NKFA, intolerances.     In house - patient would prefer her food cut-up and also softer foods. Agreeable to receive Ensure. Likes soups being served.

## 2022-08-29 NOTE — PROGRESS NOTE ADULT - SUBJECTIVE AND OBJECTIVE BOX
KEN ROBERSON 67y Female  MRN#: [id number]   Hospital Day: 6d    SUBJECTIVE  Patient is a 67y old Female who presents with a chief complaint of Hemoperitoneum (29 Aug 2022 12:50)  Currently admitted to medicine with the primary diagnosis of Intraabdominal hemorrhage      INTERVAL HPI AND OVERNIGHT EVENTS:  Patient was examined and seen at bedside. This morning she is resting comfortably in bed and reports no issues or overnight events. Patient reporting having her first BM since admission, no bleeding or melena was noted. Patiet had new CTA findings of b/l extensive PE. Patient was informed in the AM. She does not endorse any difficulty breathing, chest pain or tachycardia. Vitals stable.       OBJECTIVE  PAST MEDICAL & SURGICAL HISTORY  No pertinent past medical history    No significant past surgical history      ALLERGIES:  No Known Allergies    MEDICATIONS:  STANDING MEDICATIONS  chlorhexidine 2% Cloths 1 Application(s) Topical <User Schedule>  NIFEdipine XL 60 milliGRAM(s) Oral two times a day  polyethylene glycol 3350 17 Gram(s) Oral daily  predniSONE   Tablet 60 milliGRAM(s) Oral daily  trimethoprim   80 mG/sulfamethoxazole 400 mG 1 Tablet(s) Oral daily    PRN MEDICATIONS  acetaminophen     Tablet .. 650 milliGRAM(s) Oral every 6 hours PRN      VITAL SIGNS: Last 24 Hours  T(C): 36.1 (29 Aug 2022 12:54), Max: 36.1 (29 Aug 2022 12:54)  T(F): 97 (29 Aug 2022 12:54), Max: 97 (29 Aug 2022 12:54)  HR: 76 (29 Aug 2022 12:54) (63 - 80)  BP: 120/59 (29 Aug 2022 12:54) (120/59 - 170/81)  BP(mean): --  RR: 18 (29 Aug 2022 12:54) (18 - 18)  SpO2: 98% (29 Aug 2022 05:00) (98% - 98%)    LABS:                        13.6   8.74  )-----------( 176      ( 29 Aug 2022 08:43 )             39.8     08-29    142  |  103  |  19  ----------------------------<  194<H>  3.7   |  24  |  0.6<L>    Ca    9.5      29 Aug 2022 08:43  Mg     2.0     08-29                    RADIOLOGY:  CTA Chest 8/28:  1.  Acute bilateral segmental and subsegmental pulmonary emboli.   Evaluation for right heart strain limited due to significant cardiac   motion.  2.  No evidence of aortic dissection.  3.  Tubular arterially enhancing structure visualized in the left hepatic   lobe described above, possibly an aneurysmal distal branch of the left   hepatic artery when viewed in conjunction with celiac arteriogram   8/23/2022.  4.  Partially imaged residual hemorrhagic ascites.    CT Chest 8/25: Small b/l PE with adjacent atelectasis  CTA head/neck 8/25:   No cervical or intracranial aneurysm or pseudoaneurysm. No evidence of   major vascular stenosis or occlusion.    CTA AP 8/23:   Mildly increased large volume hemoperitoneum with prominent active   extravasation in the subhepatic region originating from a distal celiac   branch in the hepatic territory. A second focus of extravasation is not   seen on the current exam.    Unchanged apparent thickening of the transverse through sigmoid colon and   areas of the small bowel. In the setting of hypotension,   ischemic/hypoperfusion etiology should be considered.    Otherwise unchanged.      PHYSICAL EXAM:  CONSTITUTIONAL: No acute distress, well-developed, well-groomed, AAOx3  PULMONARY: Clear to auscultation bilaterally; no wheezes, rales, or rhonchi  CARDIOVASCULAR: Regular rate and rhythm; no murmurs, rubs, or gallops  GASTROINTESTINAL: Soft, non-tender, non-distended; bowel sounds present  MUSCULOSKELETAL: 2+ peripheral pulses; no clubbing, no cyanosis, no edema  SKIN: No rashes or lesions; warm and dry

## 2022-08-29 NOTE — DIETITIAN INITIAL EVALUATION ADULT - PERSON TAUGHT/METHOD
importance of adequate kcal, protein intake; importance/benefits of oral nutrition supplements/verbal instruction/patient instructed

## 2022-08-29 NOTE — CONSULT NOTE ADULT - SUBJECTIVE AND OBJECTIVE BOX
INTERVENTIONAL RADIOLOGY CONSULT:     Procedure Requested:     HPI:  68yo F w/ no significant pmhx presented to ED with left flank pain that started around 9pm last night. Patient was relaxing/watching TV when she suddenly felt this and presented to the ED. Initially patient was hypertensive and there was an initial concern for possible kidney stone. However CT a/p showed hemoperitoneum with active extravesation of celiac artery. Patient denied any past trauma or falls. Denies being on any medications including blood thinners. Denies all other complaints except her left flank/back pain.     Patient then became hemodynamically unstable BP 79/40 and became dizzy and diaphoretic. Code fusion was called. Patient received 4 total prbcs and will get 1u of ffp and platelets. IR was called and are planning for emergent embolization. Vascular also on board.      labs: hgb 13.7 -> 12.3, coags wnl, lactate 2.5 -> 1.4  imaging:   - CT a/p IV con: Large volume dense ascites compatible with hemoperitoneum. Active contrast extravasation is seen in the subhepatic region, likely arising from a distal celiac branch vessel in the hepatic territory. A possible second smaller region of active extravasation is seen adjacent to a vessel in the region of the pancreatic tail (4-48, 4-43). A dual source of bleeding is not excluded, and a CTA may be considered for further evaluation.  - CT a/p angio: Mildly increased large volume hemoperitoneum with prominent active extravasation in the subhepatic region originating from a distal celiac branch in the hepatic territory. A second focus of extravasation is not seen on the current exam.   (23 Aug 2022 06:30)      PAST MEDICAL & SURGICAL HISTORY:  No pertinent past medical history      No significant past surgical history          MEDICATIONS  (STANDING):  chlorhexidine 2% Cloths 1 Application(s) Topical <User Schedule>  NIFEdipine XL 60 milliGRAM(s) Oral two times a day  polyethylene glycol 3350 17 Gram(s) Oral daily  predniSONE   Tablet 60 milliGRAM(s) Oral daily  trimethoprim   80 mG/sulfamethoxazole 400 mG 1 Tablet(s) Oral daily    MEDICATIONS  (PRN):  acetaminophen     Tablet .. 650 milliGRAM(s) Oral every 6 hours PRN Temp greater or equal to 38C (100.4F), Mild Pain (1 - 3)      Allergies    No Known Allergies    Intolerances          FAMILY HISTORY:  No pertinent family history in first degree relatives        Physical Exam:   Vital Signs Last 24 Hrs  T(C): 35.6 (29 Aug 2022 05:00), Max: 35.8 (28 Aug 2022 22:00)  T(F): 96 (29 Aug 2022 05:00), Max: 96.4 (28 Aug 2022 22:00)  HR: 63 (29 Aug 2022 05:00) (63 - 80)  BP: 170/81 (29 Aug 2022 05:00) (124/66 - 170/81)  BP(mean): --  RR: 18 (29 Aug 2022 05:00) (18 - 18)  SpO2: 98% (29 Aug 2022 05:00) (98% - 98%)          Labs:                         13.6   8.74  )-----------( 176      ( 29 Aug 2022 08:43 )             39.8     08-29    142  |  103  |  19  ----------------------------<  194<H>  3.7   |  24  |  0.6<L>    Ca    9.5      29 Aug 2022 08:43  Mg     2.0     08-29          Pertinent labs:                      13.6   8.74  )-----------( 176      ( 29 Aug 2022 08:43 )             39.8       08-29    142  |  103  |  19  ----------------------------<  194<H>  3.7   |  24  |  0.6<L>    Ca    9.5      29 Aug 2022 08:43  Mg     2.0     08-29            Radiology & Additional Studies:     Radiology imaging reviewed.       ASSESSMENT AND PLAN:    67y woman without a significant medical history who presented initially with abdominal and flank pain, and was admitted to the critical care unit for spontaneous hemoperitoneum. She underwent coil embolization of the right gastric artery on 8/23/22. Upon further work-up for vasculitis, CT angiogram of the chest on 8/28/22 demonstrated bilateral acute segmental and subsegmental emboli, without overt evident of right heart strain. Patient is currently asymptomatic. IR was consulted to evaluate for possible thrombectomy.    Plan:  Given lack of symptoms or right heart strain, there is no indication for intervention at this time. Recommend continued medical management.      Thank you for the courtesy of this consult, please call [phone] with any further questions.

## 2022-08-29 NOTE — CHART NOTE - NSCHARTNOTEFT_GEN_A_CORE
Radiology called to report the prelim results of the CTA => showing extensive and bilateral pulmonary embolisms, right heart strain could not be assessed  Patient currently stable, not requiring any oxygen.    Given her initial presentation, the hospitalist on call was informed with the case. Decision was made to not start any anticoagulation now, rheumatology need to be informed in the AM and assess the risk/benefit ratio in regards to starting AC. Radiology called to report the prelim results of the CTA => showing extensive and bilateral pulmonary embolisms, right heart strain could not be assessed  Patient currently stable, not requiring any oxygen. Most likely going with a chronic process. EKG ordered.    Given her initial presentation, the hospitalist on call was informed with the case. Decision was made to not start any anticoagulation now, rheumatology need to be informed in the AM and assess the risk/benefit ratio in regards to starting AC.

## 2022-08-30 DIAGNOSIS — Z71.89 OTHER SPECIFIED COUNSELING: ICD-10-CM

## 2022-08-30 DIAGNOSIS — I26.99 OTHER PULMONARY EMBOLISM WITHOUT ACUTE COR PULMONALE: ICD-10-CM

## 2022-08-30 DIAGNOSIS — Z51.5 ENCOUNTER FOR PALLIATIVE CARE: ICD-10-CM

## 2022-08-30 DIAGNOSIS — F41.9 ANXIETY DISORDER, UNSPECIFIED: ICD-10-CM

## 2022-08-30 DIAGNOSIS — I77.6 ARTERITIS, UNSPECIFIED: ICD-10-CM

## 2022-08-30 LAB
ANION GAP SERPL CALC-SCNC: 10 MMOL/L — SIGNIFICANT CHANGE UP (ref 7–14)
BASOPHILS # BLD AUTO: 0.02 K/UL — SIGNIFICANT CHANGE UP (ref 0–0.2)
BASOPHILS NFR BLD AUTO: 0.2 % — SIGNIFICANT CHANGE UP (ref 0–1)
BLD GP AB SCN SERPL QL: SIGNIFICANT CHANGE UP
BUN SERPL-MCNC: 14 MG/DL — SIGNIFICANT CHANGE UP (ref 10–20)
CALCIUM SERPL-MCNC: 9.1 MG/DL — SIGNIFICANT CHANGE UP (ref 8.5–10.1)
CHLORIDE SERPL-SCNC: 103 MMOL/L — SIGNIFICANT CHANGE UP (ref 98–110)
CO2 SERPL-SCNC: 27 MMOL/L — SIGNIFICANT CHANGE UP (ref 17–32)
CREAT SERPL-MCNC: 0.5 MG/DL — LOW (ref 0.7–1.5)
EGFR: 101 ML/MIN/1.73M2 — SIGNIFICANT CHANGE UP
EOSINOPHIL # BLD AUTO: 0.25 K/UL — SIGNIFICANT CHANGE UP (ref 0–0.7)
EOSINOPHIL NFR BLD AUTO: 3.1 % — SIGNIFICANT CHANGE UP (ref 0–8)
GAMMA INTERFERON BACKGROUND BLD IA-ACNC: 0.01 IU/ML — SIGNIFICANT CHANGE UP
GLUCOSE BLDC GLUCOMTR-MCNC: 101 MG/DL — HIGH (ref 70–99)
GLUCOSE BLDC GLUCOMTR-MCNC: 129 MG/DL — HIGH (ref 70–99)
GLUCOSE BLDC GLUCOMTR-MCNC: 172 MG/DL — HIGH (ref 70–99)
GLUCOSE BLDC GLUCOMTR-MCNC: 97 MG/DL — SIGNIFICANT CHANGE UP (ref 70–99)
GLUCOSE SERPL-MCNC: 94 MG/DL — SIGNIFICANT CHANGE UP (ref 70–99)
HCT VFR BLD CALC: 38 % — SIGNIFICANT CHANGE UP (ref 37–47)
HGB BLD-MCNC: 12.6 G/DL — SIGNIFICANT CHANGE UP (ref 12–16)
IMM GRANULOCYTES NFR BLD AUTO: 1.6 % — HIGH (ref 0.1–0.3)
LYMPHOCYTES # BLD AUTO: 1.82 K/UL — SIGNIFICANT CHANGE UP (ref 1.2–3.4)
LYMPHOCYTES # BLD AUTO: 22.2 % — SIGNIFICANT CHANGE UP (ref 20.5–51.1)
M TB IFN-G BLD-IMP: NEGATIVE — SIGNIFICANT CHANGE UP
M TB IFN-G CD4+ BCKGRND COR BLD-ACNC: 0 IU/ML — SIGNIFICANT CHANGE UP
M TB IFN-G CD4+CD8+ BCKGRND COR BLD-ACNC: 0 IU/ML — SIGNIFICANT CHANGE UP
MAGNESIUM SERPL-MCNC: 1.9 MG/DL — SIGNIFICANT CHANGE UP (ref 1.8–2.4)
MCHC RBC-ENTMCNC: 28.6 PG — SIGNIFICANT CHANGE UP (ref 27–31)
MCHC RBC-ENTMCNC: 33.2 G/DL — SIGNIFICANT CHANGE UP (ref 32–37)
MCV RBC AUTO: 86.4 FL — SIGNIFICANT CHANGE UP (ref 81–99)
MONOCYTES # BLD AUTO: 0.71 K/UL — HIGH (ref 0.1–0.6)
MONOCYTES NFR BLD AUTO: 8.7 % — SIGNIFICANT CHANGE UP (ref 1.7–9.3)
NEUTROPHILS # BLD AUTO: 5.26 K/UL — SIGNIFICANT CHANGE UP (ref 1.4–6.5)
NEUTROPHILS NFR BLD AUTO: 64.2 % — SIGNIFICANT CHANGE UP (ref 42.2–75.2)
NRBC # BLD: 0 /100 WBCS — SIGNIFICANT CHANGE UP (ref 0–0)
PLATELET # BLD AUTO: 174 K/UL — SIGNIFICANT CHANGE UP (ref 130–400)
POTASSIUM SERPL-MCNC: 4.4 MMOL/L — SIGNIFICANT CHANGE UP (ref 3.5–5)
POTASSIUM SERPL-SCNC: 4.4 MMOL/L — SIGNIFICANT CHANGE UP (ref 3.5–5)
QUANT TB PLUS MITOGEN MINUS NIL: 9.03 IU/ML — SIGNIFICANT CHANGE UP
RBC # BLD: 4.4 M/UL — SIGNIFICANT CHANGE UP (ref 4.2–5.4)
RBC # FLD: 15.2 % — HIGH (ref 11.5–14.5)
SODIUM SERPL-SCNC: 140 MMOL/L — SIGNIFICANT CHANGE UP (ref 135–146)
WBC # BLD: 8.19 K/UL — SIGNIFICANT CHANGE UP (ref 4.8–10.8)
WBC # FLD AUTO: 8.19 K/UL — SIGNIFICANT CHANGE UP (ref 4.8–10.8)

## 2022-08-30 PROCEDURE — 99233 SBSQ HOSP IP/OBS HIGH 50: CPT

## 2022-08-30 PROCEDURE — 99223 1ST HOSP IP/OBS HIGH 75: CPT

## 2022-08-30 RX ORDER — ALPRAZOLAM 0.25 MG
0.25 TABLET ORAL EVERY 6 HOURS
Refills: 0 | Status: DISCONTINUED | OUTPATIENT
Start: 2022-08-30 | End: 2022-09-01

## 2022-08-30 RX ADMIN — Medication 1 TABLET(S): at 11:35

## 2022-08-30 RX ADMIN — Medication 60 MILLIGRAM(S): at 06:18

## 2022-08-30 RX ADMIN — Medication 0.25 MILLIGRAM(S): at 12:18

## 2022-08-30 RX ADMIN — Medication 60 MILLIGRAM(S): at 17:34

## 2022-08-30 NOTE — PROGRESS NOTE ADULT - ASSESSMENT
67y woman without a significant medical history who presented initially with abdominal and flank pain admitted for spontaneous hemoperitoneum 2/2 gastric artery pseudoaneurysm bleed.     # Medium vessel vasculitis  # Spontaneous non-traumatic hemoperitoneum  # Celiac Artery Rupture  # SMA pseudoaneurysms  # Hemorrhagic Shock (resolved)  - s/p code fusion in the ED - 5u prbc, 1u plt, 1u plasma  - Pulse-dose prednisone 60mg POD  - CTA chest - extensive b/l segmental and subsegmental PE  - IR following - Dr. Corbin confirmed IVC filter 8/31  - Venous duplex b/l LE pending  - NPO MN  - Rheumatology consulted - Dr. Nichole following   - CRP 13.9, decreased C3, intermediate atypical ANCA  - rituxin not needed, c/w prednisone until f/u clinic appointment, decrease q2 weeks  - o/p fu for steroid taper and trial of anticoagulation post IVC filter and steroid course     #Anxiety  - patient overwhelmed with new diagnosis endorsed feeling anxious/depressed  - palliative care on board for counselling and education   - will f/u recs    #HTN   - c/w nifedipine 60mg    #Misc  - DVT Prophylaxis: SCD  - Diet: dash/tlc  - GI Prophylaxis: not indicated   - Activity: IAT   - IV Fluids: none  - Code Status: full

## 2022-08-30 NOTE — PROGRESS NOTE ADULT - SUBJECTIVE AND OBJECTIVE BOX
Interval history: On 8/28, pt had a CTA chest which demonstrated multiple acute bilateral segmental and subsegmental PEs. Pt has been feeling well, denies any current or past SOB or chest pain. She had a bowel movement today, denies blood in her stool or abdominal pain.    Physical exam: GEN: Pleasant, AAO woman sitting in chair in NAD  SKIN: No rashes  PULM: Clear to auscultation b/l  CV: Regular rate and rhythm, no murmurs  EXT: No LE edema b/l

## 2022-08-30 NOTE — PROGRESS NOTE ADULT - ASSESSMENT
Concern for vasculitis of the celiac axis: with multiple pseudoaneurysms and vascular irregularities concerning for small to medium vessel vasculitis involving multiple arteries, including the right gastric artery and the superior mesenteric artery. Aside from acute-onset abdominal pain in the setting of pt's acute bleed, she has had no other symptoms to suggest any particular type of vasculitis, and her bloodwork demonstrated borderline CCP of 20, decreased C3 to 77, normal ESR and only slightly elevated CRP, overall not pointing to any specific subtype of vasculitis. The differential for pt's findings remains a localized GI vasculitis, versus an initial presentation of a systemic vasculitis such as ANCA vasculitis (which is not always ANCA positive). Pt was also found to have acute b/l PEs on CTA chest, which have been reported in the setting of various types of vasculitis including ANCA vasculitis, IgA vasculitis and vasculitis secondary to systemic connective tissue diseases, thought to possibly occur due to a prothrombotic state secondary to inflammation.   - s/p Solumedrol 1000 mg IV q day 8/24-8/26, continue prednisone 60 mg q day  - Continue Bactrim 1 SS tablet daily for PCP prophylaxis  - Pt supposed to go for IVC filter placement with IR tomorrow, agree with deferring anticoagulation for now given concern for active vasculitis and high risk of recurrent bleeding

## 2022-08-30 NOTE — PROGRESS NOTE ADULT - NUTRITIONAL ASSESSMENT
This patient has been assessed with a concern for Malnutrition and has been determined to have a diagnosis/diagnoses of Severe protein-calorie malnutrition and Underweight (BMI < 19).    This patient is being managed with:   Diet NPO after Midnight-     NPO Start Date: 30-Aug-2022   NPO Start Time: 23:59  Entered: Aug 30 2022 10:08AM    Diet DASH/TLC-  Sodium & Cholesterol Restricted  Free Water Flush Instructions:  PLEASE HAVE FOOD CUT UP BEFORE SERVING AT ALL MEALS***  Supplement Feeding Modality:  Oral  Ensure Max Cans or Servings Per Day:  1       Frequency:  Three Times a day  Entered: Aug 29 2022  4:13PM

## 2022-08-30 NOTE — CONSULT NOTE ADULT - SUBJECTIVE AND OBJECTIVE BOX
HPI:  66yo F w/ no significant pmhx presented to ED with left flank pain that started around 9pm last night. Patient was relaxing/watching TV when she suddenly felt this and presented to the ED. Initially patient was hypertensive and there was an initial concern for possible kidney stone. However CT a/p showed hemoperitoneum with active extravesation of celiac artery. Patient denied any past trauma or falls. Denies being on any medications including blood thinners. Denies all other complaints except her left flank/back pain.      Patient then became hemodynamically unstable BP 79/40 and became dizzy and diaphoretic. Code fusion was called. Patient received 4 total prbcs and will get 1u of ffp and platelets. IR was called and are planning for emergent embolization. Vascular also on board.      labs: hgb 13.7 -> 12.3, coags wnl, lactate 2.5 -> 1.4  imaging:   - CT a/p IV con: Large volume dense ascites compatible with hemoperitoneum. Active contrast extravasation is seen in the subhepatic region, likely arising from a distal celiac branch vessel in the hepatic territory. A possible second smaller region of active extravasation is seen adjacent to a vessel in the region of the pancreatic tail (4-48, 4-43). A dual source of bleeding is not excluded, and a CTA may be considered for further evaluation.  - CT a/p angio: Mildly increased large volume hemoperitoneum with prominent active extravasation in the subhepatic region originating from a distal celiac branch in the hepatic territory. A second focus of extravasation is not seen on the current exam.   (23 Aug 2022 06:30)    PERTINENT PM/SXH:   No pertinent past medical history      No significant past surgical history      FAMILY HISTORY:  No pertinent family history in first degree relatives      ITEMS NOT CHECKED ARE NOT PRESENT    SOCIAL HISTORY:   Significant other/partner[X ]  Children[X ]  Pentecostalism/Spirituality:  Substance hx:  [ ]   Tobacco hx:  [ ]   Alcohol hx: [ ]   Living Situation: [ X]Home  [ ]Long term care  [ ]Rehab [ ]Other  Home Services: [ ] HHA [ ] Mary RN [ ] Hospice  Occupation:  Home Opioid hx:  [ ] Y [ ] N [X ] I-Stop Reference No: This report was requested by: Felicita Batista | Reference #: [id number]    ADVANCE DIRECTIVES:    MOLST  [ ]  Living Will  [ ]   DECISION MAKER(s):  [X ] Health Care Proxy(s)  [ ] Surrogate(s)  [ ] Guardian           Name(s): Phone Number(s): Spouse0 Nathaniel may     BASELINE (I)ADL(s) (prior to admission):  [place]: [X ]Total  [ ] Moderate [ ]Dependent  Palliative Performance Status Version 2:       100  %    [email or web address]    Allergies    No Known Allergies    Intolerances    MEDICATIONS  (STANDING):  chlorhexidine 2% Cloths 1 Application(s) Topical <User Schedule>  NIFEdipine XL 60 milliGRAM(s) Oral two times a day  polyethylene glycol 3350 17 Gram(s) Oral daily  predniSONE   Tablet 60 milliGRAM(s) Oral daily  trimethoprim   80 mG/sulfamethoxazole 400 mG 1 Tablet(s) Oral daily    MEDICATIONS  (PRN):  acetaminophen     Tablet .. 650 milliGRAM(s) Oral every 6 hours PRN Temp greater or equal to 38C (100.4F), Mild Pain (1 - 3)    PRESENT SYMPTOMS:   Pain: [ ]yes [ ]no  QOL impact -   Location -                    Aggravating factors -  Quality -  Radiation -  Timing-  Severity (0-10 scale):  Minimal acceptable level (0-10 scale):     PAIN AD Score:     [email or web address]/cog/[email or web address] (press ctrl +  left click to view)    Dyspnea:                           [ ]Mild [ ]Moderate [ ]Severe  Anxiety:                             [ ]Mild [ ]Moderate [ ]Severe  Fatigue:                             [ ]Mild [ ]Moderate [ ]Severe  Nausea:                             [ ]Mild [ ]Moderate [ ]Severe  Loss of appetite:              [ ]Mild [ ]Moderate [ ]Severe  Constipation:                    [ ]Mild [ ]Moderate [ ]Severe    Other Symptoms:  [ ]All other review of systems negative     Palliative Performance Status Version 2:         %    [email or web address]    PHYSICAL EXAM:  Vital Signs Last 24 Hrs  T(C): 35.7 (30 Aug 2022 05:05), Max: 36.7 (29 Aug 2022 20:00)  T(F): 96.3 (30 Aug 2022 05:05), Max: 98 (29 Aug 2022 20:00)  HR: 69 (30 Aug 2022 05:05) (69 - 76)  BP: 133/75 (30 Aug 2022 05:05) (114/72 - 133/75)  BP(mean): --  RR: 18 (30 Aug 2022 05:05) (18 - 18)  SpO2: 98% (29 Aug 2022 17:00) (98% - 98%)    GENERAL:  [ ]Alert  [ ]Oriented x   [ ]Lethargic  [ ]Cachexia  [ ]Unarousable  [ ]Verbal  [ ]Non-Verbal  Behavioral:   [ ] Anxiety  [ ] Delirium [ ] Agitation [ ] Other  HEENT:  [ ]Normal   [ ]Dry mouth   [ ]ET Tube/Trach  [ ]Oral lesions  PULMONARY:   [ ]Clear [ ]Tachypnea  [ ]Audible excessive secretions   [ ]Rhonchi        [ ]Right [ ]Left [ ]Bilateral  [ ]Crackles        [ ]Right [ ]Left [ ]Bilateral  [ ]Wheezing     [ ]Right [ ]Left [ ]Bilateral  [ ]Diminished breath sounds [ ]right [ ]left [ ]bilateral  CARDIOVASCULAR:    [ ]Regular [ ]Irregular [ ]Tachy  [ ]Matt [ ]Murmur [ ]Other  GASTROINTESTINAL:  [ ]Soft  [ ]Distended   [ ]+BS  [ ]Non tender [ ]Tender  [ ]PEG [ ]OGT/ NGT  Last BM:   GENITOURINARY:  [ ]Normal [ ] Incontinent   [ ]Oliguria/Anuria   [ ]Rodriguez  MUSCULOSKELETAL:   [ ]Normal   [ ]Weakness  [ ]Bed/Wheelchair bound [ ]Edema  NEUROLOGIC:   [ ]No focal deficits  [ ]Cognitive impairment  [ ]Dysphagia [ ]Dysarthria [ ]Paresis [ ]Other   SKIN:   [ ]Normal    [ ]Rash  [ ]Pressure ulcer(s)       Present on admission [ ]y [ ]n      LABS:                        12.6   8.19  )-----------( 174      ( 30 Aug 2022 06:37 )             38.0   08-30    140  |  103  |  14  ----------------------------<  94  4.4   |  27  |  0.5<L>    Ca    9.1      30 Aug 2022 06:37  Mg     1.9     08-30          RADIOLOGY & ADDITIONAL STUDIES:    < from: CT Angio Chest Aorta w/wo IV Cont (08.28.22 @ 22:17) >    IMPRESSION:    1.  Acute bilateral segmental and subsegmental pulmonary emboli.   Evaluation for right heart strain limited due to significant cardiac   motion.  2.  No evidence of aortic dissection.  3.  Tubular arterially enhancing structure visualized in the left hepatic   lobe described above, possibly an aneurysmal distal branch of the left   hepatic artery when viewed in conjunction with celiac arteriogram   8/23/2022.  4.  Partially imaged residual hemorrhagic ascites.    Findings discussed on 8/28/2022 at 11:15 PM with internal medicine team.    --- End of Report ---        < end of copied text >      REFERRALS:   [ ]Chaplaincy  [ ]Hospice  [ ]Child Life  [ ]Social Work  [ ]Case management [ ]Holistic Therapy     Goals of Care Document:    HPI:  68yo F w/ no significant pmhx presented to ED with left flank pain that started around 9pm last night. Patient was relaxing/watching TV when she suddenly felt this and presented to the ED. Initially patient was hypertensive and there was an initial concern for possible kidney stone. However CT a/p showed hemoperitoneum with active extravesation of celiac artery. Patient denied any past trauma or falls. Denies being on any medications including blood thinners. Denies all other complaints except her left flank/back pain.      Patient then became hemodynamically unstable BP 79/40 and became dizzy and diaphoretic. Code fusion was called. Patient received 4 total prbcs and will get 1u of ffp and platelets. IR was called and are planning for emergent embolization. Vascular also on board.      labs: hgb 13.7 -> 12.3, coags wnl, lactate 2.5 -> 1.4  imaging:   - CT a/p IV con: Large volume dense ascites compatible with hemoperitoneum. Active contrast extravasation is seen in the subhepatic region, likely arising from a distal celiac branch vessel in the hepatic territory. A possible second smaller region of active extravasation is seen adjacent to a vessel in the region of the pancreatic tail (4-48, 4-43). A dual source of bleeding is not excluded, and a CTA may be considered for further evaluation.  - CT a/p angio: Mildly increased large volume hemoperitoneum with prominent active extravasation in the subhepatic region originating from a distal celiac branch in the hepatic territory. A second focus of extravasation is not seen on the current exam.   (23 Aug 2022 06:30)    PERTINENT PM/SXH:   No pertinent past medical history      No significant past surgical history      FAMILY HISTORY:  No pertinent family history in first degree relatives       ITEMS NOT CHECKED ARE NOT PRESENT    SOCIAL HISTORY:   Significant other/partner[X ]  Children[X ]  Mosque/Spirituality:  Substance hx:  [ ]   Tobacco hx:  [ ]   Alcohol hx: [ ]   Living Situation: [ X]Home  [ ]Long term care  [ ]Rehab [ ]Other  Home Services: [ ] HHA [ ] Mary RN [ ] Hospice  Occupation:  Home Opioid hx:  [ ] Y [ ] N [X ] I-Stop Reference No: This report was requested by: Felicita Batista | Reference #: [id number]    ADVANCE DIRECTIVES:    MOLST  [ ]  Living Will  [ ]   DECISION MAKER(s):  [X ] Health Care Proxy(s)  [ ] Surrogate(s)  [ ] Guardian           Name(s): Phone Number(s): Spouse0 Nathaniel may     BASELINE (I)ADL(s) (prior to admission):  [place]: [X ]Total  [ ] Moderate [ ]Dependent  Palliative Performance Status Version 2:       100  %    [email or web address]    Allergies    No Known Allergies    Intolerances    MEDICATIONS  (STANDING):  chlorhexidine 2% Cloths 1 Application(s) Topical <User Schedule>  NIFEdipine XL 60 milliGRAM(s) Oral two times a day  polyethylene glycol 3350 17 Gram(s) Oral daily  predniSONE   Tablet 60 milliGRAM(s) Oral daily  trimethoprim   80 mG/sulfamethoxazole 400 mG 1 Tablet(s) Oral daily    MEDICATIONS  (PRN):  acetaminophen     Tablet .. 650 milliGRAM(s) Oral every 6 hours PRN Temp greater or equal to 38C (100.4F), Mild Pain (1 - 3)    PRESENT SYMPTOMS:   Pain: [ ]yes [ ]no  QOL impact -   Location -                    Aggravating factors -  Quality -  Radiation -  Timing-  Severity (0-10 scale):  Minimal acceptable level (0-10 scale):     PAIN AD Score:     [email or web address]/[email or web address] (press ctrl +  left click to view)    Dyspnea:                           [ ]Mild [ ]Moderate [ ]Severe  Anxiety:                             [ ]Mild [ ]Moderate [ ]Severe  Fatigue:                             [ ]Mild [ ]Moderate [ ]Severe  Nausea:                             [ ]Mild [ ]Moderate [ ]Severe  Loss of appetite:              [ ]Mild [ ]Moderate [ ]Severe  Constipation:                    [ ]Mild [ ]Moderate [ ]Severe    Other Symptoms:  [ ]All other review of systems negative     Palliative Performance Status Version 2:         %    [email or web address]    PHYSICAL EXAM:  Vital Signs Last 24 Hrs  T(C): 35.6 (30 Aug 2022 14:00), Max: 36.7 (29 Aug 2022 20:00)  T(F): 96 (30 Aug 2022 14:00), Max: 98 (29 Aug 2022 20:00)  HR: 83 (30 Aug 2022 14:00) (69 - 83)  BP: 132/71 (30 Aug 2022 14:00) (114/72 - 133/75)  BP(mean): --  RR: 18 (30 Aug 2022 14:00) (18 - 18)  SpO2: 98% (29 Aug 2022 17:00) (98% - 98%)    Parameters below as of 29 Aug 2022 17:00  Patient On (Oxygen Delivery Method): room air     I&O's Summary    29 Aug 2022 07:01  -  30 Aug 2022 07:00  --------------------------------------------------------  IN: 800 mL / OUT: 0 mL / NET: 800 mL      GENERAL:  [ ]Alert  [ ]Oriented x   [ ]Lethargic  [ ]Cachexia  [ ]Unarousable  [X ]Verbal  [ ]Non-Verbal  Behavioral:   [ X] Anxiety  [ ] Delirium [ ] Agitation [ ] Calm [ ] Other  HEENT:  [X ]Normal   [ ]Dry mouth   [ ]ET Tube/Trach  [ ]Oral lesions  PULMONARY:   [ ]Clear [ ]Tachypnea  [ ]Audible excessive secretions [X ] No labored breathing  [ ]Rhonchi        [ ]Right [ ]Left [ ]Bilateral  [ ]Crackles        [ ]Right [ ]Left [ ]Bilateral  [ ]Wheezing     [ ]Right [ ]Left [ ]Bilateral  [ ]Diminished breath sounds [ ]right [ ]left [ ]bilateral  CARDIOVASCULAR:    [ ]Regular [ ]Irregular [ ]Tachy  [ ]Matt [ ]Murmur [ ]Other  GASTROINTESTINAL:  [ ]Soft  [ ]Distended  [X ] Not distended [ ]+BS  [ ]Non tender [ ]Tender  [ ]PEG [ ]OGT/ NGT  Last BM:   GENITOURINARY:  [ X]Normal [ ] Incontinent   [ ]Oliguria/Anuria   [ ]Rodriguez  MUSCULOSKELETAL:   [ ]Normal   [ ]Weakness  [ ]Bed/Wheelchair bound [ ]Edema  NEUROLOGIC:   [X ]No focal deficits  [ ]Cognitive impairment  [ ]Dysphagia [ ]Dysarthria [ ]Paresis [ ]Other   SKIN:   [ ]Normal   [X ] Nonjaundiced [ ]Rash  [ ]Pressure ulcer(s)       Present on admission [ ]y [ ]n      LABS: reviewed                        12.6   8.19  )-----------( 174      ( 30 Aug 2022 06:37 )             38.0   08-30    140  |  103  |  14  ----------------------------<  94  4.4   |  27  |  0.5<L>    Ca    9.1      30 Aug 2022 06:37  Mg     1.9     08-30          RADIOLOGY & ADDITIONAL STUDIES:    < from: CT Angio Chest Aorta w/wo IV Cont (08.28.22 @ 22:17) >    IMPRESSION:    1.  Acute bilateral segmental and subsegmental pulmonary emboli.   Evaluation for right heart strain limited due to significant cardiac   motion.  2.  No evidence of aortic dissection.  3.  Tubular arterially enhancing structure visualized in the left hepatic   lobe described above, possibly an aneurysmal distal branch of the left   hepatic artery when viewed in conjunction with celiac arteriogram   8/23/2022.  4.  Partially imaged residual hemorrhagic ascites.    Findings discussed on 8/28/2022 at 11:15 PM with internal medicine team.    --- End of Report ---        < end of copied text >      REFERRALS:   [ ]Chaplaincy  [ ]Hospice  [ ]Child Life  [ ]Social Work  [ ]Case management [ ]Holistic Therapy     Goals of Care Document:

## 2022-08-30 NOTE — CONSULT NOTE ADULT - NS ATTEND AMEND GEN_ALL_CORE FT
Agree with above, patient with anxiety in setting of recent diagnosis, open to social work, chaplaincy, and trial of xanax. Will follow  ______________  Perry Young MD  Palliative Medicine  [facility]   of Geriatric and Palliative Medicine  [phone]

## 2022-08-30 NOTE — PROGRESS NOTE ADULT - SUBJECTIVE AND OBJECTIVE BOX
KEN ROBERSON 67y Female  MRN#: [id number]   Hospital Day: 7d    SUBJECTIVE  Patient is a 67y old Female who presents with a chief complaint of Hemoperitoneum (30 Aug 2022 11:34)  Currently admitted to medicine with the primary diagnosis of Intraabdominal hemorrhage      INTERVAL HPI AND OVERNIGHT EVENTS:  Patient was examined and seen at bedside. This morning she is resting comfortably in bed with daughter bedside. Patient has no active complaints, is walking around, using IS, having appropriate BMs/Urination. Spoke with patient and daughter about IVC stent placement. Scheduled for tomorrow with Dr. Corbin. Vitals stable, no events overnight.     OBJECTIVE  PAST MEDICAL & SURGICAL HISTORY  No pertinent past medical history    No significant past surgical history      ALLERGIES:  No Known Allergies    MEDICATIONS:  STANDING MEDICATIONS  chlorhexidine 2% Cloths 1 Application(s) Topical <User Schedule>  NIFEdipine XL 60 milliGRAM(s) Oral two times a day  polyethylene glycol 3350 17 Gram(s) Oral daily  predniSONE   Tablet 60 milliGRAM(s) Oral daily  trimethoprim   80 mG/sulfamethoxazole 400 mG 1 Tablet(s) Oral daily    PRN MEDICATIONS  acetaminophen     Tablet .. 650 milliGRAM(s) Oral every 6 hours PRN  ALPRAZolam 0.25 milliGRAM(s) Oral every 6 hours PRN      VITAL SIGNS: Last 24 Hours  T(C): 35.7 (30 Aug 2022 05:05), Max: 36.7 (29 Aug 2022 20:00)  T(F): 96.3 (30 Aug 2022 05:05), Max: 98 (29 Aug 2022 20:00)  HR: 69 (30 Aug 2022 05:05) (69 - 73)  BP: 133/75 (30 Aug 2022 05:05) (114/72 - 133/75)  BP(mean): --  RR: 18 (30 Aug 2022 05:05) (18 - 18)  SpO2: 98% (29 Aug 2022 17:00) (98% - 98%)    LABS:                        12.6   8.19  )-----------( 174      ( 30 Aug 2022 06:37 )             38.0     08-30    140  |  103  |  14  ----------------------------<  94  4.4   |  27  |  0.5<L>    Ca    9.1      30 Aug 2022 06:37  Mg     1.9     08-30                    RADIOLOGY:  CTA Chest 8/28:  1.  Acute bilateral segmental and subsegmental pulmonary emboli.   Evaluation for right heart strain limited due to significant cardiac   motion.  2.  No evidence of aortic dissection.  3.  Tubular arterially enhancing structure visualized in the left hepatic   lobe described above, possibly an aneurysmal distal branch of the left   hepatic artery when viewed in conjunction with celiac arteriogram   8/23/2022.  4.  Partially imaged residual hemorrhagic ascites.    CT Chest 8/25: Small b/l PE with adjacent atelectasis  CTA head/neck 8/25:   No cervical or intracranial aneurysm or pseudoaneurysm. No evidence of   major vascular stenosis or occlusion.    CTA AP 8/23:   Mildly increased large volume hemoperitoneum with prominent active   extravasation in the subhepatic region originating from a distal celiac   branch in the hepatic territory. A second focus of extravasation is not   seen on the current exam.    Unchanged apparent thickening of the transverse through sigmoid colon and   areas of the small bowel. In the setting of hypotension,   ischemic/hypoperfusion etiology should be considered.    Otherwise unchanged.      PHYSICAL EXAM:  CONSTITUTIONAL: No acute distress, well-developed, well-groomed, AAOx3  PULMONARY: Clear to auscultation bilaterally; no wheezes, rales, or rhonchi  CARDIOVASCULAR: Regular rate and rhythm; no murmurs, rubs, or gallops  GASTROINTESTINAL: Soft, non-tender, non-distended; bowel sounds present  MUSCULOSKELETAL: 2+ peripheral pulses; no clubbing, no cyanosis, no edema  SKIN: No rashes or lesions; warm and dry

## 2022-08-30 NOTE — CONSULT NOTE ADULT - CONSULT REASON
Suspected vasculitis
hepatic branch extravasation
Hemoperitoneum
need help with palliative for counselling the pt as pt was teraful with newly diagnosed autoimmune disease
Incidentally noted acute pulmonary emboli

## 2022-08-30 NOTE — CONSULT NOTE ADULT - PROBLEM SELECTOR RECOMMENDATION 5
- will follow  ______________  Perry Young MD  Palliative Medicine  [facility]   of Geriatric and Palliative Medicine  [phone]

## 2022-08-30 NOTE — PROGRESS NOTE ADULT - ATTENDING COMMENTS
Pt is a 67y woman without a significant medical history who presented initially with abdominal and flank pain, found to have spontaneous nontraumatic hemoperitoneum with CTA on admission showing active extravasation from celiac branch. S/p IR Celiac Arteriogram demonstrating pseudoaneurysms in multiple small to medium-sized vessels, including a pseudoaneurysm of the right gastric artery which was likely the actively extravasating vessel, and prophylactic coil embolization was performed.     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 Nichole, mildly elevated CRP, mildly decreased C3, indeterminate atypical ANCA  - As per Rheum Attending Dr. Nichole, no need for Rituxin, continue Prednisone 60mg until follow up in clinic, and decrease dose q 2 weeks     #Multiple Segmental/Subsegmental PE   - noted on screening CTA Chest , pt is clinically stable and completely asymptomatic   - as per Rheumatologist, case studies do document PE's in the setting of active vasculitic processes   - after extensive discussion and shared decision making with pt and daughter plan is for IVC filter tomorrow, continuation of steroids to control vasculitis and hopefully decrease risk of bleeding with AC, and then initiation of AC as an outpatient with subsequent removal of IVC filter.   - Dr. Corbin will perform IVC tomorrow   - Dr Nichole in agreement with this course of action, and will follow up with pt closely in clinic    #HTN - improved   - Nifedipine PO 60mg BID with improved BP control     - DVT Prophylaxis: hold AC given hemmorhagic shock on admission, pt ambulates well around the unit.       #Progress Note Handoff:  Pending (specify): IVC filter tomorrow and then ok for discharge with close rheum follow up   Family discussion: d/w daughter at bedside  Disposition: Home__x_/SNF___/Other________/Unknown at this time________        Gracia Vang, DO       Total time spent to complete patient's bedside assessment, review medical chart, discuss medical plan of care with covering medical team was more than 35 minutes  with >50% of time spent face to face with patient, discussion with patient/family and/or coordination of care.

## 2022-08-30 NOTE — CHART NOTE - NSCHARTNOTEFT_GEN_A_CORE
PALLIATIVE MEDICINE INTERDISCIPLINARY TEAM NOTE    Provider:  [  x ]Social Work   [   ]          [  x ] Initial visit [   ] Follow up    Family or contact name / phone #   Met with: [x   ] Patient  [ x  ] Family:  daughterJenise, was at bedside  [   ] Other:    Primary Language: [ x  ] English [   ] Other*:                      *Interpretation provided by:    SUPPORT DIAGNOSES            (Check all that apply)  [   ] Psychosocial spiritual assessment (PSSA)  [   ] EOL issues  [   ] Cultural / spiritual concerns  [x   ] Pain / suffering  [   ] Dementia / AMS  [   ] Other:  [x   ] AD issues  [   ] Grief / loss / sadness  [   ] Discharge issues  [x   ] Distress / coping    PSYCHOSOCIAL ASSESSMENT OF PATIENT         (Check all that apply)  [ x  ] Initial Assessment            [   ] Reassessment          [   ] Not Applicable this visit    Pain/suffering acuity:  anxiety  [   ] None to mild (0-3)           [   ] Moderate (4-6)        [  x ] High (7-10)    Mental Status:  [  x ] Alert/oriented (x3)          [   ] Confused/Altered(x2/x1)         [   ] Non-resp    Functional status:  [   ] Independent w ADLs      [x   ] Needs Assistance             [   ] Bedbound/Full Care    Coping:  [   ] Coping well                     [  x ] Coping w/difficulty            [   ] Poor coping    Support system:  [  x ] Strong                              [   ] Adequate                        [   ] Inadequate      Past history and medications for:   none    [ ] Anxiety       [ ] Depression    [ ] Sleep disorders     SPIRITUAL ASSESSMENT  Alevism/Spiritual practice: ___________________________    Role of organized Tenriism:  [   ] Important                     [   ] Some (fam tradition, cultural)               [   ] None    Effects on medical care:  [   ] Yes, _____________________________________                         [   ] None    Cultural/Temple need:  [   ] Yes, _____________________________________                         [   ] None    Refer to Pastoral Care:  [   ] Yes           [   ] No, not at this time    SERVICE PROVIDED  [   ]PSSA                                                                             [   ]Discharge support / facilitation  [ x  ]AD / goals of care counseling                                  [   ]EOL / death / bereavement counseling  [  x ]Counseling / support                                                [   ] Family meeting  [   ]Prayer / sacrament / ritual                                      [   ] Referral   [   ]Other                                                                       NOTE and Plan of Care (PoC):    Patient is a 67 year old female.  Patient was admitted on 8/23/22, dx:  intraabdominal hemorrhage.      Patient was pleasant when approached and easily engaged.  Daughter, Jenise, was at bedside.  Reviewed role of Palliative Care and provided information regarding AD.  Patient discussed that she is anxious about procedure she is having tomorrow.  Patient discussed that she is having difficulty adjusting to news of illness.  Provided information regarding prn medication to tx anxiety.  Patient wanted to try same.  Writer informed Felicita Batista NP.  Demonstrated breathing techniques for anxiety, provided CBT and meditation videos.  Support rendered.

## 2022-08-30 NOTE — CONSULT NOTE ADULT - ASSESSMENT
67yFemale with no significant PMH admitted with intraabdominal hemorrhage 2/2 gastric artery peudoaneurysm  bleed. Pt diagnosed with medium vessel vasculitis, multiple PEs, - started on prednisone and rheumatology following for recommendations.       MEDD (morphine equivalent daily dose):      See Recs below.    Please call [phone] with questions or concerns 24/7.   We will continue to follow.     Discussed with primary MD.

## 2022-08-30 NOTE — CONSULT NOTE ADULT - PROBLEM SELECTOR RECOMMENDATION 4
- discussed with patient regarding role of palliative care  - patient endorsed being very nervous and fearful of her diagnosis, upcoming IVC filter placement, etc.  - she was agreeable to social work and chaplaincy involvement

## 2022-08-31 LAB
% ALBUMIN: 59.4 % — SIGNIFICANT CHANGE UP
% ALPHA 1: 5.7 % — SIGNIFICANT CHANGE UP
% ALPHA 2: 11.4 % — SIGNIFICANT CHANGE UP
% BETA: 10.1 % — SIGNIFICANT CHANGE UP
% GAMMA: 13.4 % — SIGNIFICANT CHANGE UP
ALBUMIN SERPL ELPH-MCNC: 3.3 G/DL — LOW (ref 3.6–5.5)
ALBUMIN/GLOB SERPL ELPH: 1.4 RATIO — SIGNIFICANT CHANGE UP
ALPHA1 GLOB SERPL ELPH-MCNC: 0.3 G/DL — SIGNIFICANT CHANGE UP (ref 0.1–0.4)
ALPHA2 GLOB SERPL ELPH-MCNC: 0.6 G/DL — SIGNIFICANT CHANGE UP (ref 0.5–1)
ANION GAP SERPL CALC-SCNC: 11 MMOL/L — SIGNIFICANT CHANGE UP (ref 7–14)
ANION GAP SERPL CALC-SCNC: 9 MMOL/L — SIGNIFICANT CHANGE UP (ref 7–14)
APTT BLD: 24.5 SEC — LOW (ref 27–39.2)
B-GLOBULIN SERPL ELPH-MCNC: 0.6 G/DL — SIGNIFICANT CHANGE UP (ref 0.5–1)
BASOPHILS # BLD AUTO: 0.02 K/UL — SIGNIFICANT CHANGE UP (ref 0–0.2)
BASOPHILS # BLD AUTO: 0.04 K/UL — SIGNIFICANT CHANGE UP (ref 0–0.2)
BASOPHILS NFR BLD AUTO: 0.2 % — SIGNIFICANT CHANGE UP (ref 0–1)
BASOPHILS NFR BLD AUTO: 0.4 % — SIGNIFICANT CHANGE UP (ref 0–1)
BLD GP AB SCN SERPL QL: SIGNIFICANT CHANGE UP
BUN SERPL-MCNC: 17 MG/DL — SIGNIFICANT CHANGE UP (ref 10–20)
BUN SERPL-MCNC: 20 MG/DL — SIGNIFICANT CHANGE UP (ref 10–20)
CALCIUM SERPL-MCNC: 8.7 MG/DL — SIGNIFICANT CHANGE UP (ref 8.5–10.1)
CALCIUM SERPL-MCNC: 9.5 MG/DL — SIGNIFICANT CHANGE UP (ref 8.5–10.1)
CHLORIDE SERPL-SCNC: 106 MMOL/L — SIGNIFICANT CHANGE UP (ref 98–110)
CHLORIDE SERPL-SCNC: 107 MMOL/L — SIGNIFICANT CHANGE UP (ref 98–110)
CO2 SERPL-SCNC: 24 MMOL/L — SIGNIFICANT CHANGE UP (ref 17–32)
CO2 SERPL-SCNC: 27 MMOL/L — SIGNIFICANT CHANGE UP (ref 17–32)
CREAT SERPL-MCNC: 0.5 MG/DL — LOW (ref 0.7–1.5)
CREAT SERPL-MCNC: 0.6 MG/DL — LOW (ref 0.7–1.5)
EGFR: 101 ML/MIN/1.73M2 — SIGNIFICANT CHANGE UP
EGFR: 97 ML/MIN/1.73M2 — SIGNIFICANT CHANGE UP
EOSINOPHIL # BLD AUTO: 0.14 K/UL — SIGNIFICANT CHANGE UP (ref 0–0.7)
EOSINOPHIL # BLD AUTO: 0.18 K/UL — SIGNIFICANT CHANGE UP (ref 0–0.7)
EOSINOPHIL NFR BLD AUTO: 1.4 % — SIGNIFICANT CHANGE UP (ref 0–8)
EOSINOPHIL NFR BLD AUTO: 1.9 % — SIGNIFICANT CHANGE UP (ref 0–8)
GAMMA GLOBULIN: 0.8 G/DL — SIGNIFICANT CHANGE UP (ref 0.6–1.6)
GLUCOSE BLDC GLUCOMTR-MCNC: 102 MG/DL — HIGH (ref 70–99)
GLUCOSE BLDC GLUCOMTR-MCNC: 104 MG/DL — HIGH (ref 70–99)
GLUCOSE BLDC GLUCOMTR-MCNC: 147 MG/DL — HIGH (ref 70–99)
GLUCOSE SERPL-MCNC: 117 MG/DL — HIGH (ref 70–99)
GLUCOSE SERPL-MCNC: 88 MG/DL — SIGNIFICANT CHANGE UP (ref 70–99)
HCT VFR BLD CALC: 36.1 % — LOW (ref 37–47)
HCT VFR BLD CALC: 42.9 % — SIGNIFICANT CHANGE UP (ref 37–47)
HGB BLD-MCNC: 12.3 G/DL — SIGNIFICANT CHANGE UP (ref 12–16)
HGB BLD-MCNC: 14.2 G/DL — SIGNIFICANT CHANGE UP (ref 12–16)
IMM GRANULOCYTES NFR BLD AUTO: 1.7 % — HIGH (ref 0.1–0.3)
IMM GRANULOCYTES NFR BLD AUTO: 2.4 % — HIGH (ref 0.1–0.3)
INR BLD: 1.04 RATIO — SIGNIFICANT CHANGE UP (ref 0.65–1.3)
INTERPRETATION SERPL IFE-IMP: SIGNIFICANT CHANGE UP
LYMPHOCYTES # BLD AUTO: 1.03 K/UL — LOW (ref 1.2–3.4)
LYMPHOCYTES # BLD AUTO: 1.86 K/UL — SIGNIFICANT CHANGE UP (ref 1.2–3.4)
LYMPHOCYTES # BLD AUTO: 10.5 % — LOW (ref 20.5–51.1)
LYMPHOCYTES # BLD AUTO: 20.1 % — LOW (ref 20.5–51.1)
MAGNESIUM SERPL-MCNC: 2.1 MG/DL — SIGNIFICANT CHANGE UP (ref 1.8–2.4)
MAGNESIUM SERPL-MCNC: 2.1 MG/DL — SIGNIFICANT CHANGE UP (ref 1.8–2.4)
MCHC RBC-ENTMCNC: 28.6 PG — SIGNIFICANT CHANGE UP (ref 27–31)
MCHC RBC-ENTMCNC: 29.4 PG — SIGNIFICANT CHANGE UP (ref 27–31)
MCHC RBC-ENTMCNC: 33.1 G/DL — SIGNIFICANT CHANGE UP (ref 32–37)
MCHC RBC-ENTMCNC: 34.1 G/DL — SIGNIFICANT CHANGE UP (ref 32–37)
MCV RBC AUTO: 86.2 FL — SIGNIFICANT CHANGE UP (ref 81–99)
MCV RBC AUTO: 86.3 FL — SIGNIFICANT CHANGE UP (ref 81–99)
MONOCYTES # BLD AUTO: 0.44 K/UL — SIGNIFICANT CHANGE UP (ref 0.1–0.6)
MONOCYTES # BLD AUTO: 0.81 K/UL — HIGH (ref 0.1–0.6)
MONOCYTES NFR BLD AUTO: 4.5 % — SIGNIFICANT CHANGE UP (ref 1.7–9.3)
MONOCYTES NFR BLD AUTO: 8.7 % — SIGNIFICANT CHANGE UP (ref 1.7–9.3)
NEUTROPHILS # BLD AUTO: 6.21 K/UL — SIGNIFICANT CHANGE UP (ref 1.4–6.5)
NEUTROPHILS # BLD AUTO: 7.96 K/UL — HIGH (ref 1.4–6.5)
NEUTROPHILS NFR BLD AUTO: 67.2 % — SIGNIFICANT CHANGE UP (ref 42.2–75.2)
NEUTROPHILS NFR BLD AUTO: 81 % — HIGH (ref 42.2–75.2)
NRBC # BLD: 0 /100 WBCS — SIGNIFICANT CHANGE UP (ref 0–0)
NRBC # BLD: 0 /100 WBCS — SIGNIFICANT CHANGE UP (ref 0–0)
PLATELET # BLD AUTO: 174 K/UL — SIGNIFICANT CHANGE UP (ref 130–400)
PLATELET # BLD AUTO: 233 K/UL — SIGNIFICANT CHANGE UP (ref 130–400)
POTASSIUM SERPL-MCNC: 3.9 MMOL/L — SIGNIFICANT CHANGE UP (ref 3.5–5)
POTASSIUM SERPL-MCNC: 4.5 MMOL/L — SIGNIFICANT CHANGE UP (ref 3.5–5)
POTASSIUM SERPL-SCNC: 3.9 MMOL/L — SIGNIFICANT CHANGE UP (ref 3.5–5)
POTASSIUM SERPL-SCNC: 4.5 MMOL/L — SIGNIFICANT CHANGE UP (ref 3.5–5)
PROT PATTERN SERPL ELPH-IMP: SIGNIFICANT CHANGE UP
PROT SERPL-MCNC: 5.6 G/DL — LOW (ref 6–8.3)
PROTHROM AB SERPL-ACNC: 11.9 SEC — SIGNIFICANT CHANGE UP (ref 9.95–12.87)
RBC # BLD: 4.19 M/UL — LOW (ref 4.2–5.4)
RBC # BLD: 4.97 M/UL — SIGNIFICANT CHANGE UP (ref 4.2–5.4)
RBC # FLD: 15.3 % — HIGH (ref 11.5–14.5)
RBC # FLD: 15.4 % — HIGH (ref 11.5–14.5)
SODIUM SERPL-SCNC: 140 MMOL/L — SIGNIFICANT CHANGE UP (ref 135–146)
SODIUM SERPL-SCNC: 144 MMOL/L — SIGNIFICANT CHANGE UP (ref 135–146)
WBC # BLD: 9.26 K/UL — SIGNIFICANT CHANGE UP (ref 4.8–10.8)
WBC # BLD: 9.83 K/UL — SIGNIFICANT CHANGE UP (ref 4.8–10.8)
WBC # FLD AUTO: 9.26 K/UL — SIGNIFICANT CHANGE UP (ref 4.8–10.8)
WBC # FLD AUTO: 9.83 K/UL — SIGNIFICANT CHANGE UP (ref 4.8–10.8)

## 2022-08-31 PROCEDURE — 99233 SBSQ HOSP IP/OBS HIGH 50: CPT

## 2022-08-31 PROCEDURE — 37191 INS ENDOVAS VENA CAVA FILTR: CPT

## 2022-08-31 PROCEDURE — 99232 SBSQ HOSP IP/OBS MODERATE 35: CPT

## 2022-08-31 PROCEDURE — 93970 EXTREMITY STUDY: CPT | Mod: 26

## 2022-08-31 RX ADMIN — POLYETHYLENE GLYCOL 3350 17 GRAM(S): 17 POWDER, FOR SOLUTION ORAL at 12:42

## 2022-08-31 RX ADMIN — Medication 60 MILLIGRAM(S): at 18:02

## 2022-08-31 RX ADMIN — Medication 60 MILLIGRAM(S): at 05:10

## 2022-08-31 RX ADMIN — CHLORHEXIDINE GLUCONATE 1 APPLICATION(S): 213 SOLUTION TOPICAL at 05:11

## 2022-08-31 RX ADMIN — Medication 1 TABLET(S): at 12:42

## 2022-08-31 NOTE — PROGRESS NOTE ADULT - ASSESSMENT
67y woman without a significant medical history who presented initially with abdominal and flank pain admitted for spontaneous hemoperitoneum 2/2 gastric artery pseudoaneurysm bleed.     # Medium vessel vasculitis  # Spontaneous non-traumatic hemoperitoneum  # Celiac Artery Rupture  # SMA pseudoaneurysms  # Hemorrhagic Shock (resolved)  - s/p code fusion in the ED - 5u prbc, 1u plt, 1u plasma  - Pulse-dose prednisone 60mg POD  - CTA chest - extensive b/l segmental and subsegmental PE  - Vascular consulted regarding starting AC- Vascular rec that due to high risk of bleeding continue to delay initiation of AC   - IR following - Dr. Corbin confirmed IVC filter 8/31  - Venous duplex b/l LE pending  - Rheumatology consulted - Dr. Nichole following   - CRP 13.9, decreased C3, intermediate atypical ANCA  - rituxin not needed, c/w prednisone until f/u clinic appointment, decrease q2 weeks  - o/p fu for steroid taper and trial of anticoagulation post IVC filter and steroid course     #Anxiety  - patient overwhelmed with new diagnosis endorsed feeling anxious/depressed  - palliative care on board for counselling and education     #HTN   - c/w nifedipine 60mg    #Misc  - DVT Prophylaxis: SCD  - Diet: dash/tlc  - GI Prophylaxis: not indicated   - Activity: IAT   - IV Fluids: none  - Code Status: full

## 2022-08-31 NOTE — PROGRESS NOTE ADULT - SUBJECTIVE AND OBJECTIVE BOX
HPI:  68yo F w/ no significant pmhx presented to ED with left flank pain that started around 9pm last night. Patient was relaxing/watching TV when she suddenly felt this and presented to the ED. Initially patient was hypertensive and there was an initial concern for possible kidney stone. However CT a/p showed hemoperitoneum with active extravesation of celiac artery. Patient denied any past trauma or falls. Denies being on any medications including blood thinners. Denies all other complaints except her left flank/back pain.     Patient then became hemodynamically unstable BP 79/40 and became dizzy and diaphoretic. Code fusion was called. Patient received 4 total prbcs and will get 1u of ffp and platelets. IR was called and are planning for emergent embolization. Vascular also on board.      labs: hgb 13.7 -> 12.3, coags wnl, lactate 2.5 -> 1.4  imaging:   - CT a/p IV con: Large volume dense ascites compatible with hemoperitoneum. Active contrast extravasation is seen in the subhepatic region, likely arising from a distal celiac branch vessel in the hepatic territory. A possible second smaller region of active extravasation is seen adjacent to a vessel in the region of the pancreatic tail (4-48, 4-43). A dual source of bleeding is not excluded, and a CTA may be considered for further evaluation.  - CT a/p angio: Mildly increased large volume hemoperitoneum with prominent active extravasation in the subhepatic region originating from a distal celiac branch in the hepatic territory. A second focus of extravasation is not seen on the current exam.   (23 Aug 2022 06:30)    Currently admitted to medicine with the primary diagnosis of Intraabdominal hemorrhage       Today is hospital day 8d.     INTERVAL HPI / OVERNIGHT EVENTS:  Patient was examined and seen at bedside. This morning she is resting comfortably in bed and reports no new issues or overnight events. No abdominal pain. ROS negative    ROS: Otherwise unremarkable     PAST MEDICAL & SURGICAL HISTORY  No pertinent past medical history    No significant past surgical history      ALLERGIES  No Known Allergies    MEDICATIONS  STANDING MEDICATIONS  chlorhexidine 2% Cloths 1 Application(s) Topical <User Schedule>  NIFEdipine XL 60 milliGRAM(s) Oral two times a day  polyethylene glycol 3350 17 Gram(s) Oral daily  predniSONE   Tablet 60 milliGRAM(s) Oral daily  trimethoprim   80 mG/sulfamethoxazole 400 mG 1 Tablet(s) Oral daily    PRN MEDICATIONS  acetaminophen     Tablet .. 650 milliGRAM(s) Oral every 6 hours PRN  ALPRAZolam 0.25 milliGRAM(s) Oral every 6 hours PRN    VITALS:  T(F): 96.1  HR: 85  BP: 124/76  RR: 18  SpO2: --    PHYSICAL EXAM  GEN: NAD, Resting comfortably in bed  PULM: Clear to auscultation bilaterally, No wheezes  CVS: Regular rate and rhythm, S1-S2, no murmurs  ABD: Soft, non-tender, non-distended, no guarding  EXT: No edema  NEURO: A&Ox3, no focal deficits    LABS                        14.2   9.83  )-----------( 233      ( 31 Aug 2022 07:59 )             42.9     08-31    144  |  106  |  17  ----------------------------<  117<H>  4.5   |  27  |  0.6<L>    Ca    9.5      31 Aug 2022 07:59  Mg     2.1     08-31      PT/INR - ( 31 Aug 2022 01:28 )   PT: 11.90 sec;   INR: 1.04 ratio         PTT - ( 31 Aug 2022 01:28 )  PTT:24.5 sec              RADIOLOGY

## 2022-08-31 NOTE — PROGRESS NOTE ADULT - SUBJECTIVE AND OBJECTIVE BOX
HPI:  68yo F w/ no significant pmhx presented to ED with left flank pain that started around 9pm last night. Patient was relaxing/watching TV when she suddenly felt this and presented to the ED. Initially patient was hypertensive and there was an initial concern for possible kidney stone. However CT a/p showed hemoperitoneum with active extravesation of celiac artery. Patient denied any past trauma or falls. Denies being on any medications including blood thinners. Denies all other complaints except her left flank/back pain.     Patient then became hemodynamically unstable BP 79/40 and became dizzy and diaphoretic. Code fusion was called. Patient received 4 total prbcs and will get 1u of ffp and platelets. IR was called and are planning for emergent embolization. Vascular also on board.      labs: hgb 13.7 -> 12.3, coags wnl, lactate 2.5 -> 1.4  imaging:   - CT a/p IV con: Large volume dense ascites compatible with hemoperitoneum. Active contrast extravasation is seen in the subhepatic region, likely arising from a distal celiac branch vessel in the hepatic territory. A possible second smaller region of active extravasation is seen adjacent to a vessel in the region of the pancreatic tail (4-48, 4-43). A dual source of bleeding is not excluded, and a CTA may be considered for further evaluation.  - CT a/p angio: Mildly increased large volume hemoperitoneum with prominent active extravasation in the subhepatic region originating from a distal celiac branch in the hepatic territory. A second focus of extravasation is not seen on the current exam.   (23 Aug 2022 06:30)     INTERVAL EVENTS:    ADVANCE DIRECTIVES:    MOLST  [ ]  Living Will  [ ]   DECISION MAKER(s):  [ ] Health Care Proxy(s)  [X ] Surrogate(s)  [ ] Guardian           Name(s): Phone Number(s):  and daughter     BASELINE (I)ADL(s) (prior to admission):  [place]: [ X]Total  [ ] Moderate [ ]Dependent  Palliative Performance Status Version 2:     100    %    http://npcrc.org/files/news/palliative_performance_scale_ppsv2.pdf    Allergies    No Known Allergies    Intolerances    MEDICATIONS  (STANDING):  chlorhexidine 2% Cloths 1 Application(s) Topical <User Schedule>  NIFEdipine XL 60 milliGRAM(s) Oral two times a day  polyethylene glycol 3350 17 Gram(s) Oral daily  predniSONE   Tablet 60 milliGRAM(s) Oral daily  trimethoprim   80 mG/sulfamethoxazole 400 mG 1 Tablet(s) Oral daily    MEDICATIONS  (PRN):  acetaminophen     Tablet .. 650 milliGRAM(s) Oral every 6 hours PRN Temp greater or equal to 38C (100.4F), Mild Pain (1 - 3)  ALPRAZolam 0.25 milliGRAM(s) Oral every 6 hours PRN Anxiety/panic    PRESENT SYMPTOMS:  Pain: [ ]yes [ X]no  QOL impact -   Location -                    Aggravating factors -  Quality -  Radiation -  Timing-  Severity (0-10 scale):  Minimal acceptable level (0-10 scale):     Dyspnea:                           [ ]Mild [ ]Moderate [ ]Severe  Anxiety:                             [ ]Mild [ ]Moderate [X ]Severe - she reports anxiety however it is a bit better than yesterday. she is trying to use some coping skills introduced to her by palliative social work and the . she found the PRN xanax very helpful.   Fatigue:                             [ ]Mild [ ]Moderate [ ]Severe  Nausea:                             [ ]Mild [ ]Moderate [ ]Severe  Loss of appetite:              [ ]Mild [ ]Moderate [ ]Severe  Constipation:                    [ ]Mild [ ]Moderate [ ]Severe    Other Symptoms:  [X ]All other review of systems negative     Palliative Performance Status Version 2:        100 %    http://npcrc.org/files/news/palliative_performance_scale_ppsv2.pdf    PHYSICAL EXAM:  Vital Signs Last 24 Hrs  T(C): 35.6 (31 Aug 2022 13:32), Max: 36.4 (31 Aug 2022 04:56)  T(F): 96.1 (31 Aug 2022 13:32), Max: 97.6 (31 Aug 2022 04:56)  HR: 85 (31 Aug 2022 13:32) (65 - 85)  BP: 124/76 (31 Aug 2022 13:32) (124/76 - 157/74)  BP(mean): --  RR: 18 (31 Aug 2022 13:32) (18 - 18)  SpO2: --    GENERAL:  [ X]Alert  [X ]Oriented x  3 [ ]Lethargic  [ ]Cachexia  [ ]Unarousable  [ X]Verbal  [ ]Non-Verbal  Behavioral:   [ ] Anxiety  [ ] Delirium [ ] Agitation [X ] Calm   HEENT:  [ X]Normal   [ ]Dry mouth   [ ]ET Tube/Trach  [ ]Oral lesions  PULMONARY:   [X ]Clear [ ]Tachypnea  [ ]Audible excessive secretions   [ ]Rhonchi        [ ]Right [ ]Left [ ]Bilateral  [ ]Crackles        [ ]Right [ ]Left [ ]Bilateral  [ ]Wheezing     [ ]Right [ ]Left [ ]Bilateral  [ ]Diminished breath sounds [ ]right [ ]left [ ]bilateral  CARDIOVASCULAR:    [X ]Regular [ ]Irregular [ ]Tachy  [ ]Matt [ ]Murmur [ ]Other  GASTROINTESTINAL:  [ X]Soft  [ ]Distended   [ ]+BS  [ ]Non tender [ ]Tender  [ ]PEG [ ]OGT/ NGT  Last BM:   GENITOURINARY:  [ X]Normal [ ] Incontinent   [ ]Oliguria/Anuria   [ ]Rodriguez  MUSCULOSKELETAL:   [X ]Normal   [ ]Weakness  [ ]Bed/Wheelchair bound [ ]Edema  NEUROLOGIC:   [X ]No focal deficits  [ ]Cognitive impairment  [ ]Dysphagia [ ]Dysarthria [ ]Paresis [ ]Other   SKIN:   [ X]Normal    [ ]Rash  [ ]Pressure ulcer(s)       Present on admission [ ]y [ ]n      LABS:                        14.2   9.83  )-----------( 233      ( 31 Aug 2022 07:59 )             42.9   08-31    144  |  106  |  17  ----------------------------<  117<H>  4.5   |  27  |  0.6<L>    Ca    9.5      31 Aug 2022 07:59  Mg     2.1     08-31    PT/INR - ( 31 Aug 2022 01:28 )   PT: 11.90 sec;   INR: 1.04 ratio         PTT - ( 31 Aug 2022 01:28 )  PTT:24.5 sec      RADIOLOGY & ADDITIONAL STUDIES:    < from: CT Angio Chest Aorta w/wo IV Cont (08.28.22 @ 22:17) >  IMPRESSION:    1.  Acute bilateral segmental and subsegmental pulmonary emboli.   Evaluation for right heart strain limited due to significant cardiac   motion.  2.  No evidence of aortic dissection.  3.  Tubular arterially enhancing structure visualized in the left hepatic   lobe described above, possibly an aneurysmal distal branch of the left   hepatic artery when viewed in conjunction with celiac arteriogram   8/23/2022.  4.  Partially imaged residual hemorrhagic ascites.    Findings discussed on 8/28/2022 at 11:15 PM with internal medicine team.    --- End of Report ---    < end of copied text >      REFERRALS:   [XChaplaincy  [ ]Hospice  [ ]Child Life  [ X]Social Work  [ ]Case management [ ]Holistic Therapy     Goals of Care Document:   - patients short term goal is to manage her acute anxiety and get use to her new normal          HPI:  68yo F w/ no significant pmhx presented to ED with left flank pain that started around 9pm last night. Patient was relaxing/watching TV when she suddenly felt this and presented to the ED. Initially patient was hypertensive and there was an initial concern for possible kidney stone. However CT a/p showed hemoperitoneum with active extravesation of celiac artery. Patient denied any past trauma or falls. Denies being on any medications including blood thinners. Denies all other complaints except her left flank/back pain.     Patient then became hemodynamically unstable BP 79/40 and became dizzy and diaphoretic. Code fusion was called. Patient received 4 total prbcs and will get 1u of ffp and platelets. IR was called and are planning for emergent embolization. Vascular also on board.      labs: hgb 13.7 -> 12.3, coags wnl, lactate 2.5 -> 1.4  imaging:   - CT a/p IV con: Large volume dense ascites compatible with hemoperitoneum. Active contrast extravasation is seen in the subhepatic region, likely arising from a distal celiac branch vessel in the hepatic territory. A possible second smaller region of active extravasation is seen adjacent to a vessel in the region of the pancreatic tail (4-48, 4-43). A dual source of bleeding is not excluded, and a CTA may be considered for further evaluation.  - CT a/p angio: Mildly increased large volume hemoperitoneum with prominent active extravasation in the subhepatic region originating from a distal celiac branch in the hepatic territory. A second focus of extravasation is not seen on the current exam.   (23 Aug 2022 06:30)     INTERVAL EVENTS:  8/31: patient comfortable, states xanax worked     ADVANCE DIRECTIVES:    MOLST  [ ]  Living Will  [ ]   DECISION MAKER(s):  [ ] Health Care Proxy(s)  [X ] Surrogate(s)  [ ] Guardian           Name(s): Phone Number(s):  and daughter     BASELINE (I)ADL(s) (prior to admission):  [place]: [ X]Total  [ ] Moderate [ ]Dependent  Palliative Performance Status Version 2:     100    %    [email or web address].org/files/news/palliative_performance_[email or web address]_[email or web address].pdf    Allergies    No Known Allergies    Intolerances    MEDICATIONS  (STANDING):  chlorhexidine 2% Cloths 1 Application(s) Topical <User Schedule>  NIFEdipine XL 60 milliGRAM(s) Oral two times a day  polyethylene glycol 3350 17 Gram(s) Oral daily  predniSONE   Tablet 60 milliGRAM(s) Oral daily  trimethoprim   80 mG/sulfamethoxazole 400 mG 1 Tablet(s) Oral daily    MEDICATIONS  (PRN):  acetaminophen     Tablet .. 650 milliGRAM(s) Oral every 6 hours PRN Temp greater or equal to 38C (100.4F), Mild Pain (1 - 3)  ALPRAZolam 0.25 milliGRAM(s) Oral every 6 hours PRN Anxiety/panic      PRESENT SYMPTOMS:  Pain: [ ]yes [ X]no  QOL impact -   Location -                    Aggravating factors -  Quality -  Radiation -  Timing-  Severity (0-10 scale):  Minimal acceptable level (0-10 scale):     Dyspnea:                           [ ]Mild [ ]Moderate [ ]Severe  Anxiety:                             [ ]Mild [ ]Moderate [X ]Severe - she reports anxiety however it is a bit better than yesterday. she is trying to use some coping skills introduced to her by palliative social work and the . she found the PRN xanax very helpful.   Fatigue:                             [ ]Mild [ ]Moderate [ ]Severe  Nausea:                             [ ]Mild [ ]Moderate [ ]Severe  Loss of appetite:              [ ]Mild [ ]Moderate [ ]Severe  Constipation:                    [ ]Mild [ ]Moderate [ ]Severe    Other Symptoms:  [X ]All other review of systems negative     Palliative Performance Status Version 2:        100 %    http://npcrc.org/files/news/palliative_performance_scale_[email or web address].pdf    PHYSICAL EXAM:  Vital Signs Last 24 Hrs  T(C): 35.6 (31 Aug 2022 13:32), Max: 36.4 (31 Aug 2022 04:56)  T(F): 96.1 (31 Aug 2022 13:32), Max: 97.6 (31 Aug 2022 04:56)  HR: 85 (31 Aug 2022 13:32) (65 - 85)  BP: 124/76 (31 Aug 2022 13:32) (124/76 - 157/74)  BP(mean): --  RR: 18 (31 Aug 2022 13:32) (18 - 18)  SpO2: --      GENERAL:  [ X]Alert  [X ]Oriented x  3 [ ]Lethargic  [ ]Cachexia  [ ]Unarousable  [ X]Verbal  [ ]Non-Verbal  Behavioral:   [ ] Anxiety  [ ] Delirium [ ] Agitation [X ] Calm   HEENT:  [ X]Normal   [ ]Dry mouth   [ ]ET Tube/Trach  [ ]Oral lesions  PULMONARY:   [X ]Clear [ ]Tachypnea  [ ]Audible excessive secretions   [ ]Rhonchi        [ ]Right [ ]Left [ ]Bilateral  [ ]Crackles        [ ]Right [ ]Left [ ]Bilateral  [ ]Wheezing     [ ]Right [ ]Left [ ]Bilateral  [ ]Diminished breath sounds [ ]right [ ]left [ ]bilateral  CARDIOVASCULAR:    [X ]Regular [ ]Irregular [ ]Tachy  [ ]Matt [ ]Murmur [ ]Other  GASTROINTESTINAL:  [ X]Soft  [ ]Distended   [ ]+BS  [ ]Non tender [ ]Tender  [ ]PEG [ ]OGT/ NGT  Last BM:   GENITOURINARY:  [ X]Normal [ ] Incontinent   [ ]Oliguria/Anuria   [ ]Rodriguez  MUSCULOSKELETAL:   [X ]Normal   [ ]Weakness  [ ]Bed/Wheelchair bound [ ]Edema  NEUROLOGIC:   [X ]No focal deficits  [ ]Cognitive impairment  [ ]Dysphagia [ ]Dysarthria [ ]Paresis [ ]Other   SKIN:   [ X]Normal    [ ]Rash  [ ]Pressure ulcer(s)       Present on admission [ ]y [ ]n      LABS: reviewed                        14.2   9.83  )-----------( 233      ( 31 Aug 2022 07:59 )             42.9   08-31    144  |  106  |  17  ----------------------------<  117<H>  4.5   |  27  |  0.6<L>    Ca    9.5      31 Aug 2022 07:59  Mg     2.1     08-31    PT/INR - ( 31 Aug 2022 01:28 )   PT: 11.90 sec;   INR: 1.04 ratio         PTT - ( 31 Aug 2022 01:28 )  PTT:24.5 sec      RADIOLOGY & ADDITIONAL STUDIES:    < from: CT Angio Chest Aorta w/wo IV Cont (08.28.22 @ 22:17) >  IMPRESSION:    1.  Acute bilateral segmental and subsegmental pulmonary emboli.   Evaluation for right heart strain limited due to significant cardiac   motion.  2.  No evidence of aortic dissection.  3.  Tubular arterially enhancing structure visualized in the left hepatic   lobe described above, possibly an aneurysmal distal branch of the left   hepatic artery when viewed in conjunction with celiac arteriogram   8/23/2022.  4.  Partially imaged residual hemorrhagic ascites.    Findings discussed on 8/28/2022 at 11:15 PM with internal medicine team.    --- End of Report ---    < end of copied text >      REFERRALS:   [XChaplaincy  [ ]Hospice  [ ]Child Life  [ X]Social Work  [ ]Case management [ ]Holistic Therapy     Goals of Care Document:   - patients short term goal is to manage her acute anxiety and get use to her new normal

## 2022-08-31 NOTE — PROCEDURE NOTE - PLAN
keep right leg straight for 4 hours    will follow up in IR office in 4 months with venous duplex prior

## 2022-08-31 NOTE — PROGRESS NOTE ADULT - ATTENDING COMMENTS
**My note supersedes any discrepancies that may be above in the resident's note***    67y woman without a significant medical history who presented initially with abdominal and flank pain admitted for spontaneous hemoperitoneum 2/2 gastric artery pseudoaneurysm bleed.     # Medium vessel vasculitis  # Spontaneous non-traumatic hemoperitoneum  # Celiac Artery Rupture  # SMA pseudoaneurysms  # Hemorrhagic Shock (resolved)  - s/p code fusion in the ED - 5u prbc, 1u plt, 1u plasma  - Pulse-dose prednisone 60mg POD  - Rheumatology consulted - Dr. Nichole following   - CRP 13.9, decreased C3, intermediate atypical ANCA  - rituxin not needed, c/w prednisone until f/u clinic appointment, decrease q2 weeks  - o/p fu for steroid taper and trial of anticoagulation post IVC filter and steroid course       # Acute Subsegmental PE  - incidentally found on - CTA chest - extensive b/l segmental and subsegmental PE  - currently HDS and satting well on RA with respiratory distress on exam  - CTA chest - extensive b/l segmental and subsegmental PE  - Vascular consulted regarding starting AC- Vascular rec that due to high risk of bleeding continue to delay initiation of AC   - IR following - Dr. Corbin confirmed IVC filter 8/31  - LE dupplex: Right- Deep venous thrombosis within the right peroneal vein.      #Anxiety  - patient overwhelmed with new diagnosis endorsed feeling anxious/depressed  - palliative care on board for counselling and education     #HTN   - c/w nifedipine 60mg      #Progress Note Handoff  Pending (specify):  n/a  Disposition: prepare for discharge tomorrow 9/1

## 2022-08-31 NOTE — PROGRESS NOTE ADULT - ASSESSMENT
67yFemale with no significant PMH admitted with intraabdominal hemorrhage 2/2 gastric artery peudoaneurysm  bleed. Pt diagnosed with medium vessel vasculitis, multiple PEs, - started on prednisone and rheumatology following for recommendations.     Pt is scheduled for IVC filter placement today and is feeling somewhat better about it. She is finding social work and  to be helpful with coping and will continue to PRN xanax as needed.     MEDD (morphine equivalent daily dose): 0      See Recs below.    Please call [phone] with questions or concerns 24/7.   We will continue to follow.     Discussed with primary MD.

## 2022-09-01 ENCOUNTER — TRANSCRIPTION ENCOUNTER (OUTPATIENT)
Age: 67
End: 2022-09-01

## 2022-09-01 VITALS
SYSTOLIC BLOOD PRESSURE: 150 MMHG | DIASTOLIC BLOOD PRESSURE: 64 MMHG | TEMPERATURE: 96 F | RESPIRATION RATE: 18 BRPM | HEART RATE: 80 BPM

## 2022-09-01 LAB
ANION GAP SERPL CALC-SCNC: 11 MMOL/L — SIGNIFICANT CHANGE UP (ref 7–14)
BASOPHILS # BLD AUTO: 0.03 K/UL — SIGNIFICANT CHANGE UP (ref 0–0.2)
BASOPHILS NFR BLD AUTO: 0.3 % — SIGNIFICANT CHANGE UP (ref 0–1)
BUN SERPL-MCNC: 22 MG/DL — HIGH (ref 10–20)
CALCIUM SERPL-MCNC: 9.3 MG/DL — SIGNIFICANT CHANGE UP (ref 8.5–10.1)
CHLORIDE SERPL-SCNC: 103 MMOL/L — SIGNIFICANT CHANGE UP (ref 98–110)
CO2 SERPL-SCNC: 27 MMOL/L — SIGNIFICANT CHANGE UP (ref 17–32)
CREAT SERPL-MCNC: 0.6 MG/DL — LOW (ref 0.7–1.5)
CRYOGLOB SERPL-MCNC: NEGATIVE — SIGNIFICANT CHANGE UP
EGFR: 97 ML/MIN/1.73M2 — SIGNIFICANT CHANGE UP
EOSINOPHIL # BLD AUTO: 0.22 K/UL — SIGNIFICANT CHANGE UP (ref 0–0.7)
EOSINOPHIL NFR BLD AUTO: 2.4 % — SIGNIFICANT CHANGE UP (ref 0–8)
GAMMA INTERFERON BACKGROUND BLD IA-ACNC: 0.02 IU/ML — SIGNIFICANT CHANGE UP
GLUCOSE BLDC GLUCOMTR-MCNC: 101 MG/DL — HIGH (ref 70–99)
GLUCOSE SERPL-MCNC: 135 MG/DL — HIGH (ref 70–99)
HCT VFR BLD CALC: 41.6 % — SIGNIFICANT CHANGE UP (ref 37–47)
HGB BLD-MCNC: 13.6 G/DL — SIGNIFICANT CHANGE UP (ref 12–16)
IMM GRANULOCYTES NFR BLD AUTO: 2 % — HIGH (ref 0.1–0.3)
LYMPHOCYTES # BLD AUTO: 1.96 K/UL — SIGNIFICANT CHANGE UP (ref 1.2–3.4)
LYMPHOCYTES # BLD AUTO: 21.1 % — SIGNIFICANT CHANGE UP (ref 20.5–51.1)
M TB IFN-G BLD-IMP: NEGATIVE — SIGNIFICANT CHANGE UP
M TB IFN-G CD4+ BCKGRND COR BLD-ACNC: 0 IU/ML — SIGNIFICANT CHANGE UP
M TB IFN-G CD4+CD8+ BCKGRND COR BLD-ACNC: -0.01 IU/ML — SIGNIFICANT CHANGE UP
MAGNESIUM SERPL-MCNC: 2.1 MG/DL — SIGNIFICANT CHANGE UP (ref 1.8–2.4)
MCHC RBC-ENTMCNC: 28.6 PG — SIGNIFICANT CHANGE UP (ref 27–31)
MCHC RBC-ENTMCNC: 32.7 G/DL — SIGNIFICANT CHANGE UP (ref 32–37)
MCV RBC AUTO: 87.6 FL — SIGNIFICANT CHANGE UP (ref 81–99)
MONOCYTES # BLD AUTO: 0.81 K/UL — HIGH (ref 0.1–0.6)
MONOCYTES NFR BLD AUTO: 8.7 % — SIGNIFICANT CHANGE UP (ref 1.7–9.3)
NEUTROPHILS # BLD AUTO: 6.1 K/UL — SIGNIFICANT CHANGE UP (ref 1.4–6.5)
NEUTROPHILS NFR BLD AUTO: 65.5 % — SIGNIFICANT CHANGE UP (ref 42.2–75.2)
NRBC # BLD: 0 /100 WBCS — SIGNIFICANT CHANGE UP (ref 0–0)
PLATELET # BLD AUTO: 237 K/UL — SIGNIFICANT CHANGE UP (ref 130–400)
POTASSIUM SERPL-MCNC: 5 MMOL/L — SIGNIFICANT CHANGE UP (ref 3.5–5)
POTASSIUM SERPL-SCNC: 5 MMOL/L — SIGNIFICANT CHANGE UP (ref 3.5–5)
QUANT TB PLUS MITOGEN MINUS NIL: 4.12 IU/ML — SIGNIFICANT CHANGE UP
RBC # BLD: 4.75 M/UL — SIGNIFICANT CHANGE UP (ref 4.2–5.4)
RBC # FLD: 15.3 % — HIGH (ref 11.5–14.5)
SODIUM SERPL-SCNC: 141 MMOL/L — SIGNIFICANT CHANGE UP (ref 135–146)
WBC # BLD: 9.31 K/UL — SIGNIFICANT CHANGE UP (ref 4.8–10.8)
WBC # FLD AUTO: 9.31 K/UL — SIGNIFICANT CHANGE UP (ref 4.8–10.8)

## 2022-09-01 PROCEDURE — 99232 SBSQ HOSP IP/OBS MODERATE 35: CPT

## 2022-09-01 PROCEDURE — 99497 ADVNCD CARE PLAN 30 MIN: CPT | Mod: 25

## 2022-09-01 PROCEDURE — 99239 HOSP IP/OBS DSCHRG MGMT >30: CPT

## 2022-09-01 RX ORDER — ALPRAZOLAM 0.25 MG
1 TABLET ORAL
Qty: 84 | Refills: 0
Start: 2022-09-01 | End: 2022-09-21

## 2022-09-01 RX ADMIN — Medication 60 MILLIGRAM(S): at 07:00

## 2022-09-01 RX ADMIN — Medication 1 TABLET(S): at 12:11

## 2022-09-01 RX ADMIN — CHLORHEXIDINE GLUCONATE 1 APPLICATION(S): 213 SOLUTION TOPICAL at 07:01

## 2022-09-01 RX ADMIN — Medication 60 MILLIGRAM(S): at 17:00

## 2022-09-01 NOTE — DISCHARGE NOTE PROVIDER - NSDCFUADDAPPT_GEN_ALL_CORE_FT
Dr. Jignesh Corbin - Interventional Radiologist   [address] [place] [id number] [phone]    Please schedule an appointment in ~4months

## 2022-09-01 NOTE — CHART NOTE - NSCHARTNOTESELECT_GEN_ALL_CORE
Event Note
Palliative Care SW Note/Event Note
Palliative Care SW Initial Assessment/Event Note
Palliative Care SW Note/Event Note
Transfer Note

## 2022-09-01 NOTE — PROGRESS NOTE ADULT - PROBLEM SELECTOR PLAN 2
new diagnosis   rheumatology following  currently on steroids  plan to FU outpatient for further management
new diagnosis   rheumatology following  currently on steroids  plan to FU outpatient for further management

## 2022-09-01 NOTE — DISCHARGE NOTE PROVIDER - DETAILS OF MALNUTRITION DIAGNOSIS/DIAGNOSES
This patient has been assessed with a concern for Malnutrition and was treated during this hospitalization for the following Nutrition diagnosis/diagnoses:     -  08/29/2022: Severe protein-calorie malnutrition   -  08/29/2022: Underweight (BMI < 19)

## 2022-09-01 NOTE — DISCHARGE NOTE PROVIDER - CARE PROVIDER_API CALL
[place] [address]  Phone: [phone]  Fax: [phone]  Follow Up Time: 2 weeks

## 2022-09-01 NOTE — DISCHARGE NOTE PROVIDER - NSDCMRMEDTOKEN_GEN_ALL_CORE_FT
COUNSELING: NIFEdipine 60 mg oral tablet, extended release: 1 tab(s) orally 2 times a day  predniSONE 20 mg oral tablet: 3 tab(s) orally once a day

## 2022-09-01 NOTE — DISCHARGE NOTE PROVIDER - ATTENDING DISCHARGE PHYSICAL EXAMINATION:
GEN: NAD, Resting comfortably in bed  PULM: Clear to auscultation bilaterally, No wheezes  CVS: Regular rate and rhythm, S1-S2, no murmurs  ABD: Soft, non-tender, non-distended, no guarding  EXT: No edema  NEURO: A&Ox3, no focal deficits

## 2022-09-01 NOTE — DISCHARGE NOTE PROVIDER - NSDCFUSCHEDAPPT_GEN_ALL_CORE_FT
Loree Nichole  [facility]  [address]  Scheduled Appointment: 09/06/2022

## 2022-09-01 NOTE — PROGRESS NOTE ADULT - CONVERSATION DETAILS
Spoke with patient at bedside. She is feeling better and less anxious. she is working on coping with her anxiety and may look for a therapist outside the hospital. Discussed her HCP form which she completed earlier today. Her daughters are her HCPs and help her with her health. Discussed advanced directives and MOLST form. Encouraged her to speak with family about her wishes  including if she would want intubation, CPR, PEG tubes, etc. She will review with her daughters when she goes home. Spoke with patient at bedside. She is feeling better and less anxious. she is working on coping with her anxiety and may look for a therapist outside the hospital. Discussed her HCP form which she completed earlier today. Her daughters are her HCPs and help her with her health. Discussed advanced directives and MOLST form. Encouraged her to speak with family about her wishes  including if she would want intubation, CPR, PEG tubes, etc. She will review with her daughters when she goes home. Blank MOLST form given.

## 2022-09-01 NOTE — PROGRESS NOTE ADULT - REASON FOR ADMISSION
Hemoperitoneum

## 2022-09-01 NOTE — PROGRESS NOTE ADULT - ASSESSMENT
67yFemale with no significant PMH admitted with intraabdominal hemorrhage 2/2 gastric artery peudoaneurysm  bleed. Pt diagnosed with medium vessel vasculitis, multiple PEs, - started on prednisone and rheumatology following for recommendations.     Pt is s/p IVC filter placement and feeling somewhat better about it. She is finding social work and  to be helpful with coping and will continue to PRN xanax as needed. HCp form completed today.     MEDD (morphine equivalent daily dose): 0      See Recs below.    Please call [phone] with questions or concerns 24/7.   We will continue to follow.     Discussed with primary MD.

## 2022-09-01 NOTE — PROGRESS NOTE ADULT - NS ATTEND AMEND GEN_ALL_CORE FT
Agree with above, patient comfortable, states xanax worked, met with palliative LCSW and  yesterday, will follow  ______________  Perry Young MD  Palliative Medicine  [facility]   of Geriatric and Palliative Medicine  [phone]
Agree with above, patient comfortable, states appreciating advice on anxiety coping techniques; discussed ACP, she will d/w her daughters, HCP filled out with palliative LCSW  ______________  Perry Young MD  Palliative Medicine  [facility]   of Geriatric and Palliative Medicine  [phone]

## 2022-09-01 NOTE — PROGRESS NOTE ADULT - PROBLEM SELECTOR PLAN 4
Full code  Will possibly complete HCP this week  Continue medical management  Palliative following for support and anxiety management  Will follow up
Full code  HCP completed today- Daughters are HCPs  Continue medical management  Palliative following for support and anxiety management  Pt to be discharged today

## 2022-09-01 NOTE — DISCHARGE NOTE NURSING/CASE MANAGEMENT/SOCIAL WORK - NSDCPEFALRISK_GEN_ALL_CORE
For information on Fall & Injury Prevention, visit: [email or web address] OR  [email or web address] OR  https://www.cdc.gov/steadi/patient.html

## 2022-09-01 NOTE — CHART NOTE - NSCHARTNOTEFT_GEN_A_CORE
Patient reported that the procedure went well and that she was being discharged today.  Patient requested to complete HCP.  Writer assisted with same.  Patient reported that she was feeling less anxious.

## 2022-09-01 NOTE — DISCHARGE NOTE PROVIDER - NSDCCPCAREPLAN_GEN_ALL_CORE_FT
PRINCIPAL DISCHARGE DIAGNOSIS  Diagnosis: Pseudoaneurysm  Assessment and Plan of Treatment: You were found to have bleeding in the abdomen that was caused by a psuedoanuerysm in the arteries. These arteries are inflammed which can cause swelling of the wall of the artery and bleeding. The interventional radiologist treated the bleed and also placed a filter in your large vein to prevent clots from spreasding to the lungs. If you notice any signs of bleeding at home or becaome short of breath, very fatigues, have heart palpatations or dizziness, please come back to the ED. You need to follow with the Rheumatologist Dr. Nichole to adjust your steroid dose for a complete taper and discuss starting blood thinners.

## 2022-09-01 NOTE — DISCHARGE NOTE NURSING/CASE MANAGEMENT/SOCIAL WORK - PATIENT PORTAL LINK FT
You can access the FollowMyHealth Patient Portal offered by [facility] by registering at the following website: [email or web address]. By joining [email or web address] FollowMyHealth portal, you will also be able to view your health information using other applications (apps) compatible with our system.

## 2022-09-01 NOTE — PROGRESS NOTE ADULT - PROVIDER SPECIALTY LIST ADULT
CCU
Internal Medicine
Intervent Radiology
Pulmonology
Surgery
CCU
Internal Medicine
Rheumatology
Rheumatology
Surgery
Critical Care
Critical Care
Palliative Care
Palliative Care

## 2022-09-01 NOTE — DISCHARGE NOTE PROVIDER - HOSPITAL COURSE
68yo F w/ no significant pmhx presented to ED with left flank pain that started around 9pm last night. Patient was relaxing/watching TV when she suddenly felt this and presented to the ED. Initially patient was hypertensive and there was an initial concern for possible kidney stone. However CT a/p showed hemoperitoneum with active extravasation of celiac artery. Patient denied any past trauma or falls. Denies being on any medications including blood thinners. Denies all other complaints except her left flank/back pain.    Patient then became hemodynamically unstable BP 79/40 and became dizzy and diaphoretic. Code fusion was called. Patient received 4 total pRBCs and will get 1u of FFP and platelets. IR was called and patient underwent     labs: hgb 13.7 -> 12.3, coags wnl, lactate 2.5 -> 1.4  imaging:   - CT a/p IV con: Large volume dense ascites compatible with hemoperitoneum. Active contrast extravasation is seen in the subhepatic region, likely arising from a distal celiac branch vessel in the hepatic territory. A possible second smaller region of active extravasation is seen adjacent to a vessel in the region of the pancreatic tail (4-48, 4-43). A dual source of bleeding is not excluded, and a CTA may be considered for further evaluation.  - CT a/p angio: Mildly increased large volume hemoperitoneum with prominent active extravasation in the subhepatic region originating from a distal celiac branch in the hepatic territory. A second focus of extravasation is not seen on the current exam.   68yo F w/ no significant pmhx presented to ED with left flank pain that started around 9pm last night. Patient was relaxing/watching TV when she suddenly felt this and presented to the ED. Initially patient was hypertensive and there was an initial concern for possible kidney stone. However CT a/p showed hemoperitoneum with active extravasation of celiac artery. Patient denied any past trauma or falls. Denies being on any medications including blood thinners. Denies all other complaints except her left flank/back pain.    labs: hgb 13.7 -> 12.3, coags wnl, lactate 2.5 -> 1.4  imaging:   - CT a/p IV con: Large volume dense ascites compatible with hemoperitoneum. Active contrast extravasation is seen in the subhepatic region, likely arising from a distal celiac branch vessel in the hepatic territory. A possible second smaller region of active extravasation is seen adjacent to a vessel in the region of the pancreatic tail (4-48, 4-43). A dual source of bleeding is not excluded, and a CTA may be considered for further evaluation.  - CT a/p angio: Mildly increased large volume hemoperitoneum with prominent active extravasation in the subhepatic region originating from a distal celiac branch in the hepatic territory. A second focus of extravasation is not seen on the current exam.    Patient then became hemodynamically unstable BP 79/40 and became dizzy and diaphoretic. Code fusion was called. Patient received 4 total pRBCs and will get 1u of FFP and platelets. IR was called and patient underwent IR guided coil embolization of the right gastric artery.     Patient was admitted to medicine for etiology workup of pseudoaneurysm. Rheumatology was consulted, preliminary AI workup grossly negative. Patient was started on pulse dose steroids. During investingation CTA revealed multiple pseudoaneurysms and extensive acute b/l subsegmental and submental PE in the lungs. Vascular and IR were consulted. Patient is not a candidate for anticoagulation at this time because risk of bleeding outweights benefits of anticoagulation. Prophylactic IVF filter placed by IR on 8/31 with Dr. Silva. Patient tolerated the procedure without complications. She will f/u op with Dr. Nichole in the rheumatology clinic to continue management of pseudoaneurysm and steroid taper. Once acute inflammatory process has resolved patient may begin anticoagulation trial.

## 2022-09-01 NOTE — PROGRESS NOTE ADULT - PROBLEM SELECTOR PLAN 5
Full code  HCP completed today- Daughters are HCPs  Continue medical management  Palliative following for support and anxiety management  Pt to be discharged today

## 2022-09-01 NOTE — PROGRESS NOTE ADULT - PROBLEM SELECTOR PLAN 3
2/2 situational stress and adjustment issues  pt at baseline is an anxious person   continue xanax 0.25 mg PO Q 6 H PRN   patient to look into therapist outside of hospital
2/2 situational stress and adjustment issues  pt at baseline is an anxious person   continue xanax 0.25 mg PO Q 6 H PRN   palliative social work to FU  palliative  to FU

## 2022-09-01 NOTE — PROGRESS NOTE ADULT - SUBJECTIVE AND OBJECTIVE BOX
HPI:  66yo F w/ no significant pmhx presented to ED with left flank pain that started around 9pm last night. Patient was relaxing/watching TV when she suddenly felt this and presented to the ED. Initially patient was hypertensive and there was an initial concern for possible kidney stone. However CT a/p showed hemoperitoneum with active extravesation of celiac artery. Patient denied any past trauma or falls. Denies being on any medications including blood thinners. Denies all other complaints except her left flank/back pain.     Patient then became hemodynamically unstable BP 79/40 and became dizzy and diaphoretic. Code fusion was called. Patient received 4 total prbcs and will get 1u of ffp and platelets. IR was called and are planning for emergent embolization. Vascular also on board.      labs: hgb 13.7 -> 12.3, coags wnl, lactate 2.5 -> 1.4  imaging:   - CT a/p IV con: Large volume dense ascites compatible with hemoperitoneum. Active contrast extravasation is seen in the subhepatic region, likely arising from a distal celiac branch vessel in the hepatic territory. A possible second smaller region of active extravasation is seen adjacent to a vessel in the region of the pancreatic tail (4-48, 4-43). A dual source of bleeding is not excluded, and a CTA may be considered for further evaluation.  - CT a/p angio: Mildly increased large volume hemoperitoneum with prominent active extravasation in the subhepatic region originating from a distal celiac branch in the hepatic territory. A second focus of extravasation is not seen on the current exam.   (23 Aug 2022 06:30)     INTERVAL EVENTS:  8/31: patient comfortable, states xanax worked     ADVANCE DIRECTIVES:    MOLST  [ ]  Living Will  [ ]   DECISION MAKER(s):  [X ] Health Care Proxy(s)  [ ] Surrogate(s)  [ ] Guardian           Name(s): Phone Number(s): daughter     BASELINE (I)ADL(s) (prior to admission):  [place]: [ X]Total  [ ] Moderate [ ]Dependent  Palliative Performance Status Version 2:     100    %    http://npcrc.org/files/news/palliative_performance_scale_[email or web address].pdf    Allergies    No Known Allergies    Intolerances    MEDICATIONS  (STANDING):  chlorhexidine 2% Cloths 1 Application(s) Topical <User Schedule>  NIFEdipine XL 60 milliGRAM(s) Oral two times a day  polyethylene glycol 3350 17 Gram(s) Oral daily  predniSONE   Tablet 60 milliGRAM(s) Oral daily  trimethoprim   80 mG/sulfamethoxazole 400 mG 1 Tablet(s) Oral daily    MEDICATIONS  (PRN):  acetaminophen     Tablet .. 650 milliGRAM(s) Oral every 6 hours PRN Temp greater or equal to 38C (100.4F), Mild Pain (1 - 3)  ALPRAZolam 0.25 milliGRAM(s) Oral every 6 hours PRN Anxiety/panic      PRESENT SYMPTOMS:  Pain: [ ]yes [ X]no  QOL impact -   Location -                    Aggravating factors -  Quality -  Radiation -  Timing-  Severity (0-10 scale):  Minimal acceptable level (0-10 scale):     Dyspnea:                           [ ]Mild [ ]Moderate [ ]Severe  Anxiety:                             [ ]Mild [ ]Moderate [X ]Severe - she reports anxiety however it is a bit better than yesterday. she is trying to use some coping skills introduced to her by palliative social work and the . she found the PRN xanax very helpful.   Fatigue:                             [ ]Mild [ ]Moderate [ ]Severe  Nausea:                             [ ]Mild [ ]Moderate [ ]Severe  Loss of appetite:              [ ]Mild [ ]Moderate [ ]Severe  Constipation:                    [ ]Mild [ ]Moderate [ ]Severe    Other Symptoms:  [X ]All other review of systems negative     Palliative Performance Status Version 2:        100 %    http://npcrc.org/files/news/palliative_performance_scale_[email or web address].pdf    PHYSICAL EXAM:  ICU Vital Signs Last 24 Hrs  T(C): 35.7 (01 Sep 2022 14:00), Max: 36.9 (01 Sep 2022 07:00)  T(F): 96.3 (01 Sep 2022 14:00), Max: 98.4 (01 Sep 2022 07:00)  HR: 80 (01 Sep 2022 14:00) (62 - 82)  BP: 150/64 (01 Sep 2022 14:00) (126/71 - 155/82)  RR: 18 (01 Sep 2022 14:00) (17 - 18)  SpO2: 99% (31 Aug 2022 22:29) (99% - 99%)    GENERAL:  [ X]Alert  [X ]Oriented x  3 [ ]Lethargic  [ ]Cachexia  [ ]Unarousable  [ X]Verbal  [ ]Non-Verbal  Behavioral:   [ ] Anxiety  [ ] Delirium [ ] Agitation [X ] Calm   HEENT:  [ X]Normal   [ ]Dry mouth   [ ]ET Tube/Trach  [ ]Oral lesions  PULMONARY:   [X ]Clear [ ]Tachypnea  [ ]Audible excessive secretions   [ ]Rhonchi        [ ]Right [ ]Left [ ]Bilateral  [ ]Crackles        [ ]Right [ ]Left [ ]Bilateral  [ ]Wheezing     [ ]Right [ ]Left [ ]Bilateral  [ ]Diminished breath sounds [ ]right [ ]left [ ]bilateral  CARDIOVASCULAR:    [X ]Regular [ ]Irregular [ ]Tachy  [ ]Matt [ ]Murmur [ ]Other  GASTROINTESTINAL:  [ X]Soft  [ ]Distended   [ ]+BS  [ ]Non tender [ ]Tender  [ ]PEG [ ]OGT/ NGT  Last BM:   GENITOURINARY:  [ X]Normal [ ] Incontinent   [ ]Oliguria/Anuria   [ ]Rodriguez  MUSCULOSKELETAL:   [X ]Normal   [ ]Weakness  [ ]Bed/Wheelchair bound [ ]Edema  NEUROLOGIC:   [X ]No focal deficits  [ ]Cognitive impairment  [ ]Dysphagia [ ]Dysarthria [ ]Paresis [ ]Other   SKIN:   [ X]Normal    [ ]Rash  [ ]Pressure ulcer(s)       Present on admission [ ]y [ ]n      LABS: reviewed                        14.2   9.83  )-----------( 233      ( 31 Aug 2022 07:59 )             42.9   08-31    144  |  106  |  17  ----------------------------<  117<H>  4.5   |  27  |  0.6<L>    Ca    9.5      31 Aug 2022 07:59  Mg     2.1     08-31    PT/INR - ( 31 Aug 2022 01:28 )   PT: 11.90 sec;   INR: 1.04 ratio         PTT - ( 31 Aug 2022 01:28 )  PTT:24.5 sec      RADIOLOGY & ADDITIONAL STUDIES:    < from: CT Angio Chest Aorta w/wo IV Cont (08.28.22 @ 22:17) >  IMPRESSION:    1.  Acute bilateral segmental and subsegmental pulmonary emboli.   Evaluation for right heart strain limited due to significant cardiac   motion.  2.  No evidence of aortic dissection.  3.  Tubular arterially enhancing structure visualized in the left hepatic   lobe described above, possibly an aneurysmal distal branch of the left   hepatic artery when viewed in conjunction with celiac arteriogram   8/23/2022.  4.  Partially imaged residual hemorrhagic ascites.    Findings discussed on 8/28/2022 at 11:15 PM with internal medicine team.    --- End of Report ---    < end of copied text >      REFERRALS:   [XChaplaincy  [ ]Hospice  [ ]Child Life  [ X]Social Work  [ ]Case management [ ]Holistic Therapy            HPI:  66yo F w/ no significant pmhx presented to ED with left flank pain that started around 9pm last night. Patient was relaxing/watching TV when she suddenly felt this and presented to the ED. Initially patient was hypertensive and there was an initial concern for possible kidney stone. However CT a/p showed hemoperitoneum with active extravesation of celiac artery. Patient denied any past trauma or falls. Denies being on any medications including blood thinners. Denies all other complaints except her left flank/back pain.     Patient then became hemodynamically unstable BP 79/40 and became dizzy and diaphoretic. Code fusion was called. Patient received 4 total prbcs and will get 1u of ffp and platelets. IR was called and are planning for emergent embolization. Vascular also on board.      labs: hgb 13.7 -> 12.3, coags wnl, lactate 2.5 -> 1.4  imaging:   - CT a/p IV con: Large volume dense ascites compatible with hemoperitoneum. Active contrast extravasation is seen in the subhepatic region, likely arising from a distal celiac branch vessel in the hepatic territory. A possible second smaller region of active extravasation is seen adjacent to a vessel in the region of the pancreatic tail (4-48, 4-43). A dual source of bleeding is not excluded, and a CTA may be considered for further evaluation.  - CT a/p angio: Mildly increased large volume hemoperitoneum with prominent active extravasation in the subhepatic region originating from a distal celiac branch in the hepatic territory. A second focus of extravasation is not seen on the current exam.   (23 Aug 2022 06:30)     INTERVAL EVENTS:  8/31: patient comfortable, states xanax worked   9/1: patient comfortable, states appreciation for assistance with anxiety, to be d/c today    ADVANCE DIRECTIVES:    MOLST  [ ]  Living Will  [ ]   DECISION MAKER(s):  [X ] Health Care Proxy(s)  [ ] Surrogate(s)  [ ] Guardian           Name(s): Phone Number(s): daughter     BASELINE (I)ADL(s) (prior to admission):  [place]: [ X]Total  [ ] Moderate [ ]Dependent  Palliative Performance Status Version 2:     100    %    http://npcrc.org/files/news/palliative_performance_scale_[email or web address].pdf    Allergies    No Known Allergies    Intolerances    MEDICATIONS  (STANDING):  chlorhexidine 2% Cloths 1 Application(s) Topical <User Schedule>  NIFEdipine XL 60 milliGRAM(s) Oral two times a day  polyethylene glycol 3350 17 Gram(s) Oral daily  predniSONE   Tablet 60 milliGRAM(s) Oral daily  trimethoprim   80 mG/sulfamethoxazole 400 mG 1 Tablet(s) Oral daily    MEDICATIONS  (PRN):  acetaminophen     Tablet .. 650 milliGRAM(s) Oral every 6 hours PRN Temp greater or equal to 38C (100.4F), Mild Pain (1 - 3)  ALPRAZolam 0.25 milliGRAM(s) Oral every 6 hours PRN Anxiety/panic    PRESENT SYMPTOMS:  Pain: [ ]yes [ X]no  QOL impact -   Location -                    Aggravating factors -  Quality -  Radiation -  Timing-  Severity (0-10 scale):  Minimal acceptable level (0-10 scale):     Dyspnea:                           [ ]Mild [ ]Moderate [ ]Severe  Anxiety:                             [ ]Mild [ ]Moderate [X ]Severe - she reports anxiety however it is a bit better than yesterday. she is trying to use some coping skills introduced to her by palliative social work and the . she found the PRN xanax very helpful.   Fatigue:                             [ ]Mild [ ]Moderate [ ]Severe  Nausea:                             [ ]Mild [ ]Moderate [ ]Severe  Loss of appetite:              [ ]Mild [ ]Moderate [ ]Severe  Constipation:                    [ ]Mild [ ]Moderate [ ]Severe    Other Symptoms:  [X ]All other review of systems negative     Palliative Performance Status Version 2:        100 %    [email or web address]/[email or web address]_[email or web address]    PHYSICAL EXAM:  Vital Signs Last 24 Hrs  T(C): 35.7 (01 Sep 2022 14:00), Max: 36.9 (01 Sep 2022 07:00)  T(F): 96.3 (01 Sep 2022 14:00), Max: 98.4 (01 Sep 2022 07:00)  HR: 80 (01 Sep 2022 14:00) (62 - 82)  BP: 150/64 (01 Sep 2022 14:00) (126/71 - 155/82)  BP(mean): --  RR: 18 (01 Sep 2022 14:00) (17 - 18)  SpO2: 99% (31 Aug 2022 22:29) (99% - 99%)    Parameters below as of 31 Aug 2022 22:29  Patient On (Oxygen Delivery Method): room air     I&O's Summary    31 Aug 2022 07:01  -  01 Sep 2022 07:00  --------------------------------------------------------  IN: 0 mL / OUT: 500 mL / NET: -500 mL      GENERAL:  [ ]Alert  [ ]Oriented x   [ ]Lethargic  [ ]Cachexia  [ ]Unarousable  [ X]Verbal  [ ]Non-Verbal  Behavioral:   [ ] Anxiety  [ ] Delirium [ ] Agitation [X ] Calm [ ] Other  HEENT:  [X ]Normal   [ ]Dry mouth   [ ]ET Tube/Trach  [ ]Oral lesions  PULMONARY:   [ ]Clear [ ]Tachypnea  [ ]Audible excessive secretions [X ] No labored breathing  [ ]Rhonchi        [ ]Right [ ]Left [ ]Bilateral  [ ]Crackles        [ ]Right [ ]Left [ ]Bilateral  [ ]Wheezing     [ ]Right [ ]Left [ ]Bilateral  [ ]Diminished breath sounds [ ]right [ ]left [ ]bilateral  CARDIOVASCULAR:    [ ]Regular [ ]Irregular [ ]Tachy  [ ]Matt [ ]Murmur [ ]Other  GASTROINTESTINAL:  [ ]Soft  [ ]Distended  [X ] Not distended [ ]+BS  [ ]Non tender [ ]Tender  [ ]PEG [ ]OGT/ NGT  Last BM:   GENITOURINARY:  [X ]Normal [ ] Incontinent   [ ]Oliguria/Anuria   [ ]Rodriguez  MUSCULOSKELETAL:   [ ]Normal   [ ]Weakness  [ ]Bed/Wheelchair bound [ ]Edema  NEUROLOGIC:   [X ]No focal deficits  [ ]Cognitive impairment  [ ]Dysphagia [ ]Dysarthria [ ]Paresis [ ]Other   SKIN:   [ ]Normal   [X ] Nonjaundiced [ ]Rash  [ ]Pressure ulcer(s)       Present on admission [ ]y [ ]n      LABS: reviewed                                   13.6   9.31  )-----------( 237      ( 01 Sep 2022 07:44 )             41.6     09-01    141  |  103  |  22<H>  ----------------------------<  135<H>  5.0   |  27  |  0.6<L>    Ca    9.3      01 Sep 2022 07:44  Mg     2.1     09-01        RADIOLOGY & ADDITIONAL STUDIES:    < from: CT Angio Chest Aorta w/wo IV Cont (08.28.22 @ 22:17) >  IMPRESSION:    1.  Acute bilateral segmental and subsegmental pulmonary emboli.   Evaluation for right heart strain limited due to significant cardiac   motion.  2.  No evidence of aortic dissection.  3.  Tubular arterially enhancing structure visualized in the left hepatic   lobe described above, possibly an aneurysmal distal branch of the left   hepatic artery when viewed in conjunction with celiac arteriogram   8/23/2022.  4.  Partially imaged residual hemorrhagic ascites.    Findings discussed on 8/28/2022 at 11:15 PM with internal medicine team.    --- End of Report ---    < end of copied text >      REFERRALS:   [X] Chaplaincy  [ ]Hospice  [ ]Child Life  [ X]Social Work  [ ]Case management [ ]Holistic Therapy

## 2022-09-01 NOTE — PROGRESS NOTE ADULT - PROBLEM SELECTOR PLAN 1
admitted with multiple PEs  plan for IVC filter placement today- high bleeding risk given active vasculitis
admitted with multiple PEs  s/p IVC filter placement high bleeding risk given active vasculitis

## 2022-09-02 RX ORDER — NIFEDIPINE 30 MG
1 TABLET, EXTENDED RELEASE 24 HR ORAL
Qty: 60 | Refills: 0
Start: 2022-09-02 | End: 2022-10-01

## 2022-09-06 ENCOUNTER — APPOINTMENT (OUTPATIENT)
Dept: RHEUMATOLOGY | Facility: CLINIC | Age: 67
End: 2022-09-06

## 2022-09-06 VITALS
HEIGHT: 67 IN | OXYGEN SATURATION: 99 % | BODY MASS INDEX: 20.4 KG/M2 | HEART RATE: 72 BPM | DIASTOLIC BLOOD PRESSURE: 73 MMHG | SYSTOLIC BLOOD PRESSURE: 144 MMHG | WEIGHT: 130 LBS

## 2022-09-06 DIAGNOSIS — K66.1 HEMOPERITONEUM: ICD-10-CM

## 2022-09-06 DIAGNOSIS — Z87.891 PERSONAL HISTORY OF NICOTINE DEPENDENCE: ICD-10-CM

## 2022-09-06 DIAGNOSIS — D62 ACUTE POSTHEMORRHAGIC ANEMIA: ICD-10-CM

## 2022-09-06 DIAGNOSIS — Z85.9 PERSONAL HISTORY OF MALIGNANT NEOPLASM, UNSPECIFIED: ICD-10-CM

## 2022-09-06 DIAGNOSIS — I10 ESSENTIAL (PRIMARY) HYPERTENSION: ICD-10-CM

## 2022-09-06 DIAGNOSIS — J98.11 ATELECTASIS: ICD-10-CM

## 2022-09-06 DIAGNOSIS — R58 HEMORRHAGE, NOT ELSEWHERE CLASSIFIED: ICD-10-CM

## 2022-09-06 DIAGNOSIS — I77.2 RUPTURE OF ARTERY: ICD-10-CM

## 2022-09-06 DIAGNOSIS — F41.9 ANXIETY DISORDER, UNSPECIFIED: ICD-10-CM

## 2022-09-06 DIAGNOSIS — Z85.820 PERSONAL HISTORY OF MALIGNANT MELANOMA OF SKIN: ICD-10-CM

## 2022-09-06 DIAGNOSIS — I72.8 ANEURYSM OF OTHER SPECIFIED ARTERIES: ICD-10-CM

## 2022-09-06 DIAGNOSIS — I95.9 HYPOTENSION, UNSPECIFIED: ICD-10-CM

## 2022-09-06 DIAGNOSIS — Z78.9 OTHER SPECIFIED HEALTH STATUS: ICD-10-CM

## 2022-09-06 DIAGNOSIS — E43 UNSPECIFIED SEVERE PROTEIN-CALORIE MALNUTRITION: ICD-10-CM

## 2022-09-06 DIAGNOSIS — I26.93 SINGLE SUBSEGMENTAL PULMONARY EMBOLISM WITHOUT ACUTE COR PULMONALE: ICD-10-CM

## 2022-09-06 DIAGNOSIS — R57.8 OTHER SHOCK: ICD-10-CM

## 2022-09-06 DIAGNOSIS — E83.42 HYPOMAGNESEMIA: ICD-10-CM

## 2022-09-06 PROCEDURE — 99204 OFFICE O/P NEW MOD 45 MIN: CPT

## 2022-09-06 PROCEDURE — 99214 OFFICE O/P EST MOD 30 MIN: CPT

## 2022-09-06 RX ORDER — PREDNISONE 50 MG/1
50 TABLET ORAL
Refills: 0 | Status: DISCONTINUED | COMMUNITY

## 2022-09-06 NOTE — HISTORY OF PRESENT ILLNESS
[FreeTextEntry1] : Pt presented to the hospital 8/22 with acute-onset severe L flank pain, and was found to have hemoperitoneum requiring coil embolization. 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 Pt had labs which demonstrated slightly elevated CRP to 13.9, slightly decreased C3 to 77, and borderline CCP of 20, but no other findings, either in terms of symptoms or labs, to point to any particular underlying etiology for pt's vasculitis. She also had a CTA chest which demonstrated bilateral segmental and subsegmental PEs, and an LE Doppler which demonstrated a R peroneal vein DVT, which were thought to be related to a prothrombotic state in the setting of pt's acute inflammation. Pt received 3 days of pulse-dose Solumedrol 8/24-8/26, and was then switched to 1 mg/kg prednisone and had an IVC filter placed. \par \par Since her discharge from the hospital, pt has been feeling well. She denies pain, swelling anywhere, diarrhea, blood in her stool, rashes, or SOB. She has been walking around and going about her ADLs without any issues. Pt was also started on prn nifedipine during her hospitalization because her blood pressure was noted to be intermittently elevated, but she only had one SBP reading in the 140s at home so far. \par \par Pt does note a long-standing history of "aches and pains" in her head and extremities which come and go. Also, pt's daughter notes that she needed a blood transfusion when she was born and had frequent infections as a child, but does not have any specific diagnoses related to these issues.\par \par Physical exam: GEN: AAO woman sitting on exam table in NAD\par SKIN: No rashes\par MOUTH: Moist mucous membranes, no oral ulcers, normal oral aperture\par PULM: Clear to auscultation b/l\par CV: Regular rate and rhythm, no murmurs\par MSK:\par Shoulders: Full ROM b/l\par Elbows: Full ROM b/l, no effusions\par Wrists: Full ROM b/l, no effusions\par Hands: no synovitis\par Hips: Full ROM b/l\par Knees: no effusions, full ROM b/l\par Ankles: no effusions, full ROM b/l\par Feet: no effusions, no TTP\par EXT: no LE edema b/l

## 2022-09-06 NOTE — ASSESSMENT
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 Aside from acute-onset abdominal pain in the setting of pt's acute bleed, she has had no other symptoms to suggest any particular type of vasculitis, and her bloodwork demonstrated borderline CCP of 20, decreased C3 to 77, normal ESR and only slightly elevated CRP, overall not pointing to any specific subtype of vasculitis. The differential for pt's findings remains a localized GI vasculitis, versus an initial presentation of a systemic vasculitis such as ANCA vasculitis (which is not always ANCA positive). Pt was also found to have acute b/l PEs on CTA chest, which have been reported in the setting of various types of vasculitis including ANCA vasculitis, IgA vasculitis and vasculitis secondary to systemic connective tissue diseases, thought to possibly occur due to a prothrombotic state secondary to inflammation. \par - Continue prednisone 60 mg daily until 9/10, then decrease to 50 mg daily\par - Restart Bactrim 1 SS tablet daily for PCP prophylaxis - it was discontinued during her hospitalization for unclear reasons\par - f/u repeat labs prior to next appt in 2 weeks, including repeat inflammatory markers, CCP, ANCA

## 2022-09-06 NOTE — REASON FOR VISIT
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

## 2022-09-13 ENCOUNTER — LABORATORY RESULT (OUTPATIENT)
Age: 67
End: 2022-09-13

## 2022-09-21 ENCOUNTER — APPOINTMENT (OUTPATIENT)
Dept: RHEUMATOLOGY | Facility: CLINIC | Age: 67
End: 2022-09-21

## 2022-09-21 VITALS
HEART RATE: 82 BPM | TEMPERATURE: 97.7 F | HEIGHT: 67 IN | SYSTOLIC BLOOD PRESSURE: 130 MMHG | WEIGHT: 130 LBS | DIASTOLIC BLOOD PRESSURE: 80 MMHG | BODY MASS INDEX: 20.4 KG/M2 | OXYGEN SATURATION: 98 %

## 2022-09-21 LAB
ALBUMIN SERPL ELPH-MCNC: 4.6 G/DL
ALP BLD-CCNC: 85 U/L
ALT SERPL-CCNC: 16 U/L
ANION GAP SERPL CALC-SCNC: 15 MMOL/L
APPEARANCE: CLEAR
AST SERPL-CCNC: 19 U/L
BACTERIA: NEGATIVE
BASOPHILS # BLD AUTO: 0.04 K/UL
BASOPHILS NFR BLD AUTO: 0.5 %
BILIRUB SERPL-MCNC: 0.9 MG/DL
BILIRUBIN URINE: NEGATIVE
BLOOD URINE: NEGATIVE
BUN SERPL-MCNC: 14 MG/DL
C3 SERPL-MCNC: 112 MG/DL
C4 SERPL-MCNC: 35 MG/DL
CALCIUM SERPL-MCNC: 9.7 MG/DL
CCP AB SER IA-ACNC: 17 UNITS
CHLORIDE SERPL-SCNC: 97 MMOL/L
CO2 SERPL-SCNC: 23 MMOL/L
COLOR: NORMAL
CREAT SERPL-MCNC: 0.7 MG/DL
CREAT SPEC-SCNC: 39 MG/DL
CREAT/PROT UR: 0.1 RATIO
CRP SERPL-MCNC: <3 MG/L
EGFR: 94 ML/MIN/1.73M2
EOSINOPHIL # BLD AUTO: 0.09 K/UL
EOSINOPHIL NFR BLD AUTO: 1.1 %
ERYTHROCYTE [SEDIMENTATION RATE] IN BLOOD BY WESTERGREN METHOD: 5 MM/HR
ESTIMATED AVERAGE GLUCOSE: 117 MG/DL
GLUCOSE QUALITATIVE U: NEGATIVE
GLUCOSE SERPL-MCNC: 103 MG/DL
HBA1C MFR BLD HPLC: 5.7 %
HCT VFR BLD CALC: 46.7 %
HGB BLD-MCNC: 14.7 G/DL
HYALINE CASTS: 1 /LPF
IMM GRANULOCYTES NFR BLD AUTO: 0.8 %
KETONES URINE: NEGATIVE
LEUKOCYTE ESTERASE URINE: NEGATIVE
LYMPHOCYTES # BLD AUTO: 1.64 K/UL
LYMPHOCYTES NFR BLD AUTO: 19.7 %
MAN DIFF?: NORMAL
MCHC RBC-ENTMCNC: 28.5 PG
MCHC RBC-ENTMCNC: 31.5 G/DL
MCV RBC AUTO: 90.5 FL
MICROSCOPIC-UA: NORMAL
MONOCYTES # BLD AUTO: 0.48 K/UL
MONOCYTES NFR BLD AUTO: 5.8 %
NEUTROPHILS # BLD AUTO: 6.02 K/UL
NEUTROPHILS NFR BLD AUTO: 72.1 %
NITRITE URINE: NEGATIVE
PH URINE: 6.5
PLATELET # BLD AUTO: 232 K/UL
POTASSIUM SERPL-SCNC: 4.4 MMOL/L
PROT SERPL-MCNC: 6.9 G/DL
PROT UR-MCNC: 5 MG/DLG/24H
PROTEIN URINE: NEGATIVE
RBC # BLD: 5.16 M/UL
RBC # FLD: 15.2 %
RED BLOOD CELLS URINE: 1 /HPF
RF+CCP IGG SER-IMP: NEGATIVE
SODIUM SERPL-SCNC: 135 MMOL/L
SPECIFIC GRAVITY URINE: 1.01
SQUAMOUS EPITHELIAL CELLS: 0 /HPF
UROBILINOGEN URINE: NORMAL
WBC # FLD AUTO: 8.34 K/UL
WHITE BLOOD CELLS URINE: 0 /HPF

## 2022-09-21 PROCEDURE — 99214 OFFICE O/P EST MOD 30 MIN: CPT

## 2022-09-21 NOTE — HISTORY OF PRESENT ILLNESS
[FreeTextEntry1] : Pt has generally been feeling well since her last appointment, with her main symptom being anxiety, for which she was prescribed lorazepam by her PMD but she has not started taking it yet. She has been more active around the house and has not noticed any pain or SOB. Denies rashes.\par \par Previous HPI: Pt presented to the hospital 8/22 with acute-onset severe L flank pain, and was found to have hemoperitoneum requiring coil embolization. 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 Pt had labs which demonstrated slightly elevated CRP to 13.9, slightly decreased C3 to 77, and borderline CCP of 20, but no other findings, either in terms of symptoms or labs, to point to any particular underlying etiology for pt's vasculitis. She also had a CTA chest which demonstrated bilateral segmental and subsegmental PEs, and an LE Doppler which demonstrated a R peroneal vein DVT, which were thought to be related to a prothrombotic state in the setting of pt's acute inflammation. Pt received 3 days of pulse-dose Solumedrol 8/24-8/26, and was then switched to 1 mg/kg prednisone and had an IVC filter placed. \par \par Since her discharge from the hospital, pt has been feeling well. She denies pain, swelling anywhere, diarrhea, blood in her stool, rashes, or SOB. She has been walking around and going about her ADLs without any issues. Pt was also started on prn nifedipine during her hospitalization because her blood pressure was noted to be intermittently elevated, but she only had one SBP reading in the 140s at home so far. \par \par Pt does note a long-standing history of "aches and pains" in her head and extremities which come and go. Also, pt's daughter notes that she needed a blood transfusion when she was born and had frequent infections as a child, but does not have any specific diagnoses related to these issues.\par \par Physical exam: GEN: AAO woman sitting on exam table in NAD\par SKIN: No rashes\par MOUTH: Moist mucous membranes, no oral ulcers, normal oral aperture\par ENT: No LAD\par PULM: Clear to auscultation b/l\par CV: Regular rate and rhythm, no murmurs\par MSK:\par Shoulders: Full ROM b/l\par Elbows: Full ROM b/l, no effusions\par Wrists: Full ROM b/l, no effusions\par Hands: no synovitis\par Hips: Full ROM b/l\par Knees: no effusions, full ROM b/l\par Ankles: no effusions, full ROM b/l\par Feet: no effusions, no TTP\par EXT: no LE edema b/l

## 2022-09-21 NOTE — ASSESSMENT
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 Aside from acute-onset abdominal pain in the setting of pt's acute bleed, she has had no other symptoms to suggest any particular type of vasculitis, and her bloodwork demonstrated borderline CCP of 20, decreased C3 to 77, normal ESR and only slightly elevated CRP, overall not pointing to any specific subtype of vasculitis. The differential for pt's findings remains a localized GI vasculitis, versus an initial presentation of a systemic vasculitis such as ANCA vasculitis (which is not always ANCA positive). Pt was also found to have acute b/l PEs on CTA chest on 8/29/2022, which have been reported in the setting of various types of vasculitis including ANCA vasculitis, IgA vasculitis and vasculitis secondary to systemic connective tissue diseases, thought to possibly occur due to a prothrombotic state secondary to inflammation. \par - Continue prednisone taper: 40 mg daily x 2 weeks starting tomorrow, then down to 30 mg daily\par - Continue Bactrim 1 SS tablet daily for PCP prophylaxis\par - f/u in 1 month. Asked pt to have labs done 1 week prior to her next appt.\par - ***Will f/u Epsten Chicas virus testing with next set of labs*** - per pt's daughter she had severe mononucleosis many years ago after giving birth to one of her children, and there are case reports of vasculitis and aneurysms associated with chronic EBV infection

## 2022-10-11 ENCOUNTER — LABORATORY RESULT (OUTPATIENT)
Age: 67
End: 2022-10-11

## 2022-10-20 ENCOUNTER — NON-APPOINTMENT (OUTPATIENT)
Age: 67
End: 2022-10-20

## 2022-10-20 LAB
ALBUMIN SERPL ELPH-MCNC: 4.4 G/DL
ALP BLD-CCNC: 77 U/L
ALT SERPL-CCNC: 18 U/L
ANION GAP SERPL CALC-SCNC: 16 MMOL/L
APPEARANCE: CLEAR
AST SERPL-CCNC: 16 U/L
BACTERIA: NEGATIVE
BASOPHILS # BLD AUTO: 0.06 K/UL
BASOPHILS NFR BLD AUTO: 0.7 %
BILIRUB SERPL-MCNC: 0.4 MG/DL
BILIRUBIN URINE: NEGATIVE
BLOOD URINE: NEGATIVE
BUN SERPL-MCNC: 18 MG/DL
C3 SERPL-MCNC: 117 MG/DL
C4 SERPL-MCNC: 40 MG/DL
CALCIUM SERPL-MCNC: 9.4 MG/DL
CHLORIDE SERPL-SCNC: 98 MMOL/L
CO2 SERPL-SCNC: 23 MMOL/L
COLOR: COLORLESS
CREAT SERPL-MCNC: 0.8 MG/DL
CREAT SPEC-SCNC: 26 MG/DL
CREAT/PROT UR: <0.2 RATIO
CRP SERPL-MCNC: 7 MG/L
EGFR: 80 ML/MIN/1.73M2
EOSINOPHIL # BLD AUTO: 0.13 K/UL
EOSINOPHIL NFR BLD AUTO: 1.6 %
ERYTHROCYTE [SEDIMENTATION RATE] IN BLOOD BY WESTERGREN METHOD: 3 MM/HR
GLUCOSE QUALITATIVE U: NEGATIVE
GLUCOSE SERPL-MCNC: 99 MG/DL
HCT VFR BLD CALC: 49.5 %
HGB BLD-MCNC: 15.9 G/DL
HYALINE CASTS: 0 /LPF
IMM GRANULOCYTES NFR BLD AUTO: 1.1 %
KETONES URINE: NEGATIVE
LEUKOCYTE ESTERASE URINE: NEGATIVE
LYMPHOCYTES # BLD AUTO: 2.49 K/UL
LYMPHOCYTES NFR BLD AUTO: 30.1 %
MAN DIFF?: NORMAL
MCHC RBC-ENTMCNC: 28.3 PG
MCHC RBC-ENTMCNC: 32.1 G/DL
MCV RBC AUTO: 88.1 FL
MICROSCOPIC-UA: NORMAL
MONOCYTES # BLD AUTO: 0.75 K/UL
MONOCYTES NFR BLD AUTO: 9.1 %
NEUTROPHILS # BLD AUTO: 4.74 K/UL
NEUTROPHILS NFR BLD AUTO: 57.4 %
NITRITE URINE: NEGATIVE
PH URINE: 6.5
PLATELET # BLD AUTO: 254 K/UL
POTASSIUM SERPL-SCNC: 4.5 MMOL/L
PROT SERPL-MCNC: 6.4 G/DL
PROT UR-MCNC: <5 MG/DLG/24H
PROTEIN URINE: NEGATIVE
RBC # BLD: 5.62 M/UL
RBC # FLD: 13.7 %
RED BLOOD CELLS URINE: 0 /HPF
SODIUM SERPL-SCNC: 137 MMOL/L
SPECIFIC GRAVITY URINE: 1.01
SQUAMOUS EPITHELIAL CELLS: 0 /HPF
UROBILINOGEN URINE: NORMAL
WBC # FLD AUTO: 8.26 K/UL
WHITE BLOOD CELLS URINE: 1 /HPF

## 2022-10-25 ENCOUNTER — APPOINTMENT (OUTPATIENT)
Dept: RHEUMATOLOGY | Facility: CLINIC | Age: 67
End: 2022-10-25

## 2022-10-25 VITALS
BODY MASS INDEX: 19.93 KG/M2 | WEIGHT: 127 LBS | HEIGHT: 67 IN | OXYGEN SATURATION: 99 % | HEART RATE: 82 BPM | SYSTOLIC BLOOD PRESSURE: 130 MMHG | TEMPERATURE: 97.5 F | DIASTOLIC BLOOD PRESSURE: 87 MMHG

## 2022-10-25 PROCEDURE — 99214 OFFICE O/P EST MOD 30 MIN: CPT

## 2022-10-25 NOTE — ASSESSMENT
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 Aside from acute-onset abdominal pain in the setting of pt's acute bleed, she has had no other symptoms to suggest any particular type of vasculitis, and her bloodwork demonstrated borderline CCP of 20, decreased C3 to 77, normal ESR and only slightly elevated CRP, overall not pointing to any specific subtype of vasculitis. The differential for pt's findings remains a localized GI vasculitis, versus an initial presentation of a systemic vasculitis such as ANCA vasculitis (which is not always ANCA positive). Pt was also found to have acute b/l PEs on CTA chest on 8/29/2022, which have been reported in the setting of various types of vasculitis including ANCA vasculitis, IgA vasculitis and vasculitis secondary to systemic connective tissue diseases, thought to possibly occur due to a prothrombotic state secondary to inflammation. With slightly increased CRP and C4 on recent labs, overall improved compared to pt's hospitalization but increased compared to pt's labs just prior to the last set.\par - f/u repeat inflammatory markers, complements, ANCA, UA, UPCR, CBC, CMP. If pt's labs come back with increased inflammation again, would consider sending pt for repeat CT chest/abd/pelvis with contrast\par - Continue prednisone taper: 20 mg daily starting tomorrow x 2 weeks, then 15 mg daily \par - Continue Bactrim 1 SS tablet daily for PCP prophylaxis until pt is down to 15 mg daily prednisone\par - f/u in 1 month\par - ***Will f/u Epsten Chicas virus testing with next set of labs*** - per pt's daughter she had severe mononucleosis many years ago after giving birth to one of her children, and there are case reports of vasculitis and aneurysms associated with chronic EBV infection

## 2022-10-25 NOTE — HISTORY OF PRESENT ILLNESS
[FreeTextEntry1] : After her last appt, pt had labs which demonstrated a slightly elevated CRP to 7 and slightly elevated C4 to 40, with other labs being negative including ESR, C3, ANCA, CMP, CBC, UA, UPCR. She reports feeling stable symptoms of bloating and gas with eating since she was discharged from the hospital but she has also been forcing herself to eat more after being recommended to do so during her hospitalization. + Stye on L eye which has been improving. Otherwise, pt has not noticed any other abnormalities.\par \par Previous HPI: Pt presented to the hospital 8/22 with acute-onset severe L flank pain, and was found to have hemoperitoneum requiring coil embolization. 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 Pt had labs which demonstrated slightly elevated CRP to 13.9, slightly decreased C3 to 77, and borderline CCP of 20, but no other findings, either in terms of symptoms or labs, to point to any particular underlying etiology for pt's vasculitis. She also had a CTA chest which demonstrated bilateral segmental and subsegmental PEs, and an LE Doppler which demonstrated a R peroneal vein DVT, which were thought to be related to a prothrombotic state in the setting of pt's acute inflammation. Pt received 3 days of pulse-dose Solumedrol 8/24-8/26, and was then switched to 1 mg/kg prednisone and had an IVC filter placed. \par \par Since her discharge from the hospital, pt has been feeling well. She denies pain, swelling anywhere, diarrhea, blood in her stool, rashes, or SOB. She has been walking around and going about her ADLs without any issues. Pt was also started on prn nifedipine during her hospitalization because her blood pressure was noted to be intermittently elevated, but she only had one SBP reading in the 140s at home so far. \par \par Pt does note a long-standing history of "aches and pains" in her head and extremities which come and go. Also, pt's daughter notes that she needed a blood transfusion when she was born and had frequent infections as a child, but does not have any specific diagnoses related to these issues.\par \par Physical exam: GEN: AAO woman sitting on exam table in NAD\par SKIN: No rashes\par MOUTH: Moist mucous membranes, no oral ulcers, normal oral aperture\par ENT: No LAD\par PULM: Clear to auscultation b/l\par CV: Regular rate and rhythm, no murmurs\par MSK:\par Shoulders: Full ROM b/l\par Elbows: Full ROM b/l, no effusions\par Wrists: Full ROM b/l, no effusions\par Hands: no synovitis\par Hips: Full ROM b/l\par Knees: no effusions, full ROM b/l\par Ankles: no effusions, full ROM b/l\par Feet: no effusions, no TTP\par EXT: no LE edema b/l

## 2022-11-01 ENCOUNTER — LABORATORY RESULT (OUTPATIENT)
Age: 67
End: 2022-11-01

## 2022-11-02 ENCOUNTER — NON-APPOINTMENT (OUTPATIENT)
Age: 67
End: 2022-11-02

## 2022-11-02 LAB
ALBUMIN SERPL ELPH-MCNC: 4.7 G/DL
ALP BLD-CCNC: 70 U/L
ALT SERPL-CCNC: 17 U/L
ANION GAP SERPL CALC-SCNC: 13 MMOL/L
APPEARANCE: CLEAR
AST SERPL-CCNC: 15 U/L
BACTERIA: NEGATIVE
BASOPHILS # BLD AUTO: 0.05 K/UL
BASOPHILS NFR BLD AUTO: 0.9 %
BILIRUB SERPL-MCNC: 0.5 MG/DL
BILIRUBIN URINE: NEGATIVE
BLOOD URINE: NEGATIVE
BUN SERPL-MCNC: 21 MG/DL
C3 SERPL-MCNC: 111 MG/DL
C4 SERPL-MCNC: 30 MG/DL
CALCIUM OXALATE CRYSTALS: ABNORMAL
CALCIUM SERPL-MCNC: 9.5 MG/DL
CHLORIDE SERPL-SCNC: 102 MMOL/L
CO2 SERPL-SCNC: 27 MMOL/L
COLOR: YELLOW
CREAT SERPL-MCNC: 0.8 MG/DL
CREAT SPEC-SCNC: 111 MG/DL
CREAT/PROT UR: 0.1 RATIO
CRP SERPL-MCNC: <3 MG/L
EGFR: 80 ML/MIN/1.73M2
EOSINOPHIL # BLD AUTO: 0.02 K/UL
EOSINOPHIL NFR BLD AUTO: 0.4 %
ERYTHROCYTE [SEDIMENTATION RATE] IN BLOOD BY WESTERGREN METHOD: 7 MM/HR
GLUCOSE QUALITATIVE U: NEGATIVE
GLUCOSE SERPL-MCNC: 101 MG/DL
HCT VFR BLD CALC: 49.6 %
HGB BLD-MCNC: 15.7 G/DL
HYALINE CASTS: 1 /LPF
IMM GRANULOCYTES NFR BLD AUTO: 0.6 %
KETONES URINE: NEGATIVE
LEUKOCYTE ESTERASE URINE: NEGATIVE
LYMPHOCYTES # BLD AUTO: 1.85 K/UL
LYMPHOCYTES NFR BLD AUTO: 34.1 %
MAN DIFF?: NORMAL
MCHC RBC-ENTMCNC: 28.2 PG
MCHC RBC-ENTMCNC: 31.7 G/DL
MCV RBC AUTO: 89.2 FL
MICROSCOPIC-UA: NORMAL
MONOCYTES # BLD AUTO: 0.48 K/UL
MONOCYTES NFR BLD AUTO: 8.9 %
NEUTROPHILS # BLD AUTO: 2.99 K/UL
NEUTROPHILS NFR BLD AUTO: 55.1 %
NITRITE URINE: NEGATIVE
PH URINE: 6
PLATELET # BLD AUTO: 200 K/UL
POTASSIUM SERPL-SCNC: 4.6 MMOL/L
PROT SERPL-MCNC: 6.5 G/DL
PROT UR-MCNC: 11 MG/DLG/24H
PROTEIN URINE: NORMAL
RBC # BLD: 5.56 M/UL
RBC # FLD: 13.4 %
RED BLOOD CELLS URINE: 1 /HPF
SODIUM SERPL-SCNC: 142 MMOL/L
SPECIFIC GRAVITY URINE: 1.03
SQUAMOUS EPITHELIAL CELLS: 0 /HPF
UROBILINOGEN URINE: NORMAL
WBC # FLD AUTO: 5.42 K/UL
WHITE BLOOD CELLS URINE: 1 /HPF

## 2022-11-22 ENCOUNTER — APPOINTMENT (OUTPATIENT)
Dept: RHEUMATOLOGY | Facility: CLINIC | Age: 67
End: 2022-11-22

## 2022-11-22 PROCEDURE — 99214 OFFICE O/P EST MOD 30 MIN: CPT

## 2022-11-22 RX ORDER — AZITHROMYCIN 250 MG/1
250 TABLET, FILM COATED ORAL
Qty: 6 | Refills: 0 | Status: DISCONTINUED | COMMUNITY
Start: 2022-06-26

## 2022-11-22 NOTE — HISTORY OF PRESENT ILLNESS
[FreeTextEntry1] : Pt has been feeling generally well since her last visit, aside from an intermittent toothache which has overall improved, and R low back pain on and off. She is on 15 mg prednisone currently and has not noticed any changes in her symptoms with decreasing the prednisone to this dose.\par \par Previous HPI: Pt presented to the hospital 8/22 with acute-onset severe L flank pain, and was found to have hemoperitoneum requiring coil embolization. 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 Pt had labs which demonstrated slightly elevated CRP to 13.9, slightly decreased C3 to 77, and borderline CCP of 20, but no other findings, either in terms of symptoms or labs, to point to any particular underlying etiology for pt's vasculitis. She also had a CTA chest which demonstrated bilateral segmental and subsegmental PEs, and an LE Doppler which demonstrated a R peroneal vein DVT, which were thought to be related to a prothrombotic state in the setting of pt's acute inflammation. Pt received 3 days of pulse-dose Solumedrol 8/24-8/26, and was then switched to 1 mg/kg prednisone and had an IVC filter placed. \par \par Since her discharge from the hospital, pt has been feeling well. She denies pain, swelling anywhere, diarrhea, blood in her stool, rashes, or SOB. She has been walking around and going about her ADLs without any issues. Pt was also started on prn nifedipine during her hospitalization because her blood pressure was noted to be intermittently elevated, but she only had one SBP reading in the 140s at home so far. \par \par Pt does note a long-standing history of "aches and pains" in her head and extremities which come and go. Also, pt's daughter notes that she needed a blood transfusion when she was born and had frequent infections as a child, but does not have any specific diagnoses related to these issues.\par \par Physical exam: GEN: AAO woman sitting on exam table in NAD\par SKIN: No rashes\par MOUTH: Moist mucous membranes, no oral ulcers, normal oral aperture\par ENT: No LAD\par PULM: Clear to auscultation b/l\par CV: Regular rate and rhythm, no murmurs\par MSK:\par Shoulders: Full ROM b/l\par Elbows: Full ROM b/l, no effusions\par Wrists: Full ROM b/l, no effusions\par Hands: no synovitis\par Hips: Full ROM b/l\par Knees: no effusions, full ROM b/l\par Ankles: no effusions, full ROM b/l\par Feet: no effusions, no TTP\par EXT: no LE edema b/l

## 2022-11-22 NOTE — ASSESSMENT
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 Aside from acute-onset abdominal pain in the setting of pt's acute bleed, she has had no other symptoms to suggest any particular type of vasculitis, and her bloodwork demonstrated borderline CCP of 20, decreased C3 to 77, normal ESR and only slightly elevated CRP, overall not pointing to any specific subtype of vasculitis. The differential for pt's findings remains a localized GI vasculitis, versus an initial presentation of a systemic vasculitis such as ANCA vasculitis (which is not always ANCA positive). Pt was also found to have acute b/l PEs on CTA chest on 8/29/2022, which have been reported in the setting of various types of vasculitis including ANCA vasculitis, IgA vasculitis and vasculitis secondary to systemic connective tissue diseases, thought to possibly occur due to a prothrombotic state secondary to inflammation. \par - Continue prednisone taper: 10 mg daily x 2 weeks starting 11/24, then 7.5 mg daily x 2 weeks\par - Referred pt for repeat CTA chest/abdomen/pelvis to follow up on any signs of progression of her vasculitis, especially as she is due to have her IVC filter taken out within the next month and may need anticoagulation\par - f/u repeat CBC, CMP, ESR, CRP, UA, ANCA and complements, as well as EBV testing as pt's daughter she had severe mononucleosis many years ago after giving birth to one of her children, and there are case reports of vasculitis and aneurysms associated with chronic EBV infection\par \par f/u in 1 month

## 2022-11-25 ENCOUNTER — LABORATORY RESULT (OUTPATIENT)
Age: 67
End: 2022-11-25

## 2022-11-30 ENCOUNTER — NON-APPOINTMENT (OUTPATIENT)
Age: 67
End: 2022-11-30

## 2022-11-30 LAB
ALBUMIN SERPL ELPH-MCNC: 4.8 G/DL
ALP BLD-CCNC: 74 U/L
ALT SERPL-CCNC: 18 U/L
ANION GAP SERPL CALC-SCNC: 15 MMOL/L
APPEARANCE: CLEAR
AST SERPL-CCNC: 16 U/L
BACTERIA: NEGATIVE
BASOPHILS # BLD AUTO: 0.05 K/UL
BASOPHILS NFR BLD AUTO: 0.8 %
BILIRUB SERPL-MCNC: 0.6 MG/DL
BILIRUBIN URINE: NEGATIVE
BLOOD URINE: NEGATIVE
BUN SERPL-MCNC: 21 MG/DL
C3 SERPL-MCNC: 103 MG/DL
C4 SERPL-MCNC: 25 MG/DL
CALCIUM SERPL-MCNC: 10.1 MG/DL
CHLORIDE SERPL-SCNC: 102 MMOL/L
CO2 SERPL-SCNC: 28 MMOL/L
COLOR: NORMAL
CREAT SERPL-MCNC: 0.7 MG/DL
CREAT SPEC-SCNC: 48 MG/DL
CREAT/PROT UR: 0.1 RATIO
CRP SERPL-MCNC: <3 MG/L
EGFR: 94 ML/MIN/1.73M2
EOSINOPHIL # BLD AUTO: 0.09 K/UL
EOSINOPHIL NFR BLD AUTO: 1.5 %
ERYTHROCYTE [SEDIMENTATION RATE] IN BLOOD BY WESTERGREN METHOD: 3 MM/HR
ESTIMATED AVERAGE GLUCOSE: 120 MG/DL
GLUCOSE QUALITATIVE U: NEGATIVE
GLUCOSE SERPL-MCNC: 97 MG/DL
HBA1C MFR BLD HPLC: 5.8 %
HCT VFR BLD CALC: 50.4 %
HETEROPH AB SER QL: NEGATIVE
HGB BLD-MCNC: 16.6 G/DL
HYALINE CASTS: 0 /LPF
IMM GRANULOCYTES NFR BLD AUTO: 0.5 %
KETONES URINE: NEGATIVE
LEUKOCYTE ESTERASE URINE: NEGATIVE
LYMPHOCYTES # BLD AUTO: 2.12 K/UL
LYMPHOCYTES NFR BLD AUTO: 34.2 %
MAN DIFF?: NORMAL
MCHC RBC-ENTMCNC: 28.9 PG
MCHC RBC-ENTMCNC: 32.9 G/DL
MCV RBC AUTO: 87.8 FL
MICROSCOPIC-UA: NORMAL
MONOCYTES # BLD AUTO: 0.59 K/UL
MONOCYTES NFR BLD AUTO: 9.5 %
NEUTROPHILS # BLD AUTO: 3.31 K/UL
NEUTROPHILS NFR BLD AUTO: 53.5 %
NITRITE URINE: NEGATIVE
PH URINE: 7
PLATELET # BLD AUTO: 185 K/UL
POTASSIUM SERPL-SCNC: 4 MMOL/L
PROT SERPL-MCNC: 6.9 G/DL
PROT UR-MCNC: 6 MG/DLG/24H
PROTEIN URINE: NEGATIVE
RBC # BLD: 5.74 M/UL
RBC # FLD: 13 %
RED BLOOD CELLS URINE: 1 /HPF
SODIUM SERPL-SCNC: 145 MMOL/L
SPECIFIC GRAVITY URINE: 1.02
SQUAMOUS EPITHELIAL CELLS: 0 /HPF
UROBILINOGEN URINE: NORMAL
WBC # FLD AUTO: 6.19 K/UL
WHITE BLOOD CELLS URINE: 1 /HPF

## 2022-12-16 ENCOUNTER — OUTPATIENT (OUTPATIENT)
Dept: OUTPATIENT SERVICES | Facility: HOSPITAL | Age: 67
LOS: 1 days | Discharge: HOME | End: 2022-12-16

## 2022-12-16 DIAGNOSIS — I26.99 OTHER PULMONARY EMBOLISM WITHOUT ACUTE COR PULMONALE: ICD-10-CM

## 2022-12-16 PROCEDURE — 74174 CTA ABD&PLVS W/CONTRAST: CPT | Mod: 26

## 2022-12-16 PROCEDURE — 71275 CT ANGIOGRAPHY CHEST: CPT | Mod: 26

## 2022-12-19 ENCOUNTER — NON-APPOINTMENT (OUTPATIENT)
Age: 67
End: 2022-12-19

## 2022-12-30 ENCOUNTER — OUTPATIENT (OUTPATIENT)
Dept: OUTPATIENT SERVICES | Facility: HOSPITAL | Age: 67
LOS: 1 days | Discharge: HOME | End: 2022-12-30
Payer: MEDICARE

## 2022-12-30 DIAGNOSIS — I26.99 OTHER PULMONARY EMBOLISM WITHOUT ACUTE COR PULMONALE: ICD-10-CM

## 2022-12-30 PROCEDURE — 93970 EXTREMITY STUDY: CPT | Mod: 26

## 2023-01-05 ENCOUNTER — APPOINTMENT (OUTPATIENT)
Dept: INTERVENTIONAL RADIOLOGY/VASCULAR | Facility: CLINIC | Age: 68
End: 2023-01-05
Payer: MEDICARE

## 2023-01-05 VITALS
SYSTOLIC BLOOD PRESSURE: 158 MMHG | DIASTOLIC BLOOD PRESSURE: 88 MMHG | BODY MASS INDEX: 19.93 KG/M2 | HEART RATE: 88 BPM | WEIGHT: 127 LBS | HEIGHT: 67 IN | TEMPERATURE: 97.2 F

## 2023-01-05 PROCEDURE — 99213 OFFICE O/P EST LOW 20 MIN: CPT

## 2023-01-09 ENCOUNTER — OUTPATIENT (OUTPATIENT)
Dept: OUTPATIENT SERVICES | Facility: HOSPITAL | Age: 68
LOS: 1 days | Discharge: HOME | End: 2023-01-09
Payer: MEDICARE

## 2023-01-09 VITALS
RESPIRATION RATE: 16 BRPM | TEMPERATURE: 99 F | DIASTOLIC BLOOD PRESSURE: 70 MMHG | HEART RATE: 78 BPM | SYSTOLIC BLOOD PRESSURE: 140 MMHG | HEIGHT: 68 IN | WEIGHT: 130.07 LBS | OXYGEN SATURATION: 100 %

## 2023-01-09 DIAGNOSIS — I26.99 OTHER PULMONARY EMBOLISM WITHOUT ACUTE COR PULMONALE: ICD-10-CM

## 2023-01-09 DIAGNOSIS — Z01.818 ENCOUNTER FOR OTHER PREPROCEDURAL EXAMINATION: ICD-10-CM

## 2023-01-09 DIAGNOSIS — Z95.828 PRESENCE OF OTHER VASCULAR IMPLANTS AND GRAFTS: Chronic | ICD-10-CM

## 2023-01-09 DIAGNOSIS — Z98.890 OTHER SPECIFIED POSTPROCEDURAL STATES: Chronic | ICD-10-CM

## 2023-01-09 DIAGNOSIS — K55.059 ACUTE (REVERSIBLE) ISCHEMIA OF INTESTINE, PART AND EXTENT UNSPECIFIED: Chronic | ICD-10-CM

## 2023-01-09 LAB
ALBUMIN SERPL ELPH-MCNC: 4.8 G/DL — SIGNIFICANT CHANGE UP (ref 3.5–5.2)
ALP SERPL-CCNC: 70 U/L — SIGNIFICANT CHANGE UP (ref 30–115)
ALT FLD-CCNC: 22 U/L — SIGNIFICANT CHANGE UP (ref 0–41)
ANION GAP SERPL CALC-SCNC: 11 MMOL/L — SIGNIFICANT CHANGE UP (ref 7–14)
APTT BLD: 33.1 SEC — SIGNIFICANT CHANGE UP (ref 27–39.2)
AST SERPL-CCNC: 18 U/L — SIGNIFICANT CHANGE UP (ref 0–41)
BASOPHILS # BLD AUTO: 0.04 K/UL — SIGNIFICANT CHANGE UP (ref 0–0.2)
BASOPHILS NFR BLD AUTO: 0.8 % — SIGNIFICANT CHANGE UP (ref 0–1)
BILIRUB SERPL-MCNC: 0.6 MG/DL — SIGNIFICANT CHANGE UP (ref 0.2–1.2)
BUN SERPL-MCNC: 16 MG/DL — SIGNIFICANT CHANGE UP (ref 10–20)
CALCIUM SERPL-MCNC: 10.2 MG/DL — SIGNIFICANT CHANGE UP (ref 8.4–10.5)
CHLORIDE SERPL-SCNC: 100 MMOL/L — SIGNIFICANT CHANGE UP (ref 98–110)
CO2 SERPL-SCNC: 27 MMOL/L — SIGNIFICANT CHANGE UP (ref 17–32)
CREAT SERPL-MCNC: 0.6 MG/DL — LOW (ref 0.7–1.5)
EGFR: 97 ML/MIN/1.73M2 — SIGNIFICANT CHANGE UP
EOSINOPHIL # BLD AUTO: 0.04 K/UL — SIGNIFICANT CHANGE UP (ref 0–0.7)
EOSINOPHIL NFR BLD AUTO: 0.8 % — SIGNIFICANT CHANGE UP (ref 0–8)
GLUCOSE SERPL-MCNC: 83 MG/DL — SIGNIFICANT CHANGE UP (ref 70–99)
HCT VFR BLD CALC: 49.8 % — HIGH (ref 37–47)
HGB BLD-MCNC: 16.4 G/DL — HIGH (ref 12–16)
IMM GRANULOCYTES NFR BLD AUTO: 0.2 % — SIGNIFICANT CHANGE UP (ref 0.1–0.3)
INR BLD: 0.98 RATIO — SIGNIFICANT CHANGE UP (ref 0.65–1.3)
LYMPHOCYTES # BLD AUTO: 1.37 K/UL — SIGNIFICANT CHANGE UP (ref 1.2–3.4)
LYMPHOCYTES # BLD AUTO: 26.8 % — SIGNIFICANT CHANGE UP (ref 20.5–51.1)
MCHC RBC-ENTMCNC: 28.6 PG — SIGNIFICANT CHANGE UP (ref 27–31)
MCHC RBC-ENTMCNC: 32.9 G/DL — SIGNIFICANT CHANGE UP (ref 32–37)
MCV RBC AUTO: 86.8 FL — SIGNIFICANT CHANGE UP (ref 81–99)
MONOCYTES # BLD AUTO: 0.34 K/UL — SIGNIFICANT CHANGE UP (ref 0.1–0.6)
MONOCYTES NFR BLD AUTO: 6.6 % — SIGNIFICANT CHANGE UP (ref 1.7–9.3)
NEUTROPHILS # BLD AUTO: 3.32 K/UL — SIGNIFICANT CHANGE UP (ref 1.4–6.5)
NEUTROPHILS NFR BLD AUTO: 64.8 % — SIGNIFICANT CHANGE UP (ref 42.2–75.2)
NRBC # BLD: 0 /100 WBCS — SIGNIFICANT CHANGE UP (ref 0–0)
PLATELET # BLD AUTO: 200 K/UL — SIGNIFICANT CHANGE UP (ref 130–400)
POTASSIUM SERPL-MCNC: 4.8 MMOL/L — SIGNIFICANT CHANGE UP (ref 3.5–5)
POTASSIUM SERPL-SCNC: 4.8 MMOL/L — SIGNIFICANT CHANGE UP (ref 3.5–5)
PROT SERPL-MCNC: 6.9 G/DL — SIGNIFICANT CHANGE UP (ref 6–8)
PROTHROM AB SERPL-ACNC: 11.2 SEC — SIGNIFICANT CHANGE UP (ref 9.95–12.87)
RBC # BLD: 5.74 M/UL — HIGH (ref 4.2–5.4)
RBC # FLD: 12.9 % — SIGNIFICANT CHANGE UP (ref 11.5–14.5)
SODIUM SERPL-SCNC: 138 MMOL/L — SIGNIFICANT CHANGE UP (ref 135–146)
WBC # BLD: 5.12 K/UL — SIGNIFICANT CHANGE UP (ref 4.8–10.8)
WBC # FLD AUTO: 5.12 K/UL — SIGNIFICANT CHANGE UP (ref 4.8–10.8)

## 2023-01-09 PROCEDURE — 93010 ELECTROCARDIOGRAM REPORT: CPT

## 2023-01-09 NOTE — H&P PST ADULT - LYMPHATIC
Pt discharged by PA. Pt provided with discharge instructions Rx and instructions on follow up care. Pt out of ED ambulatory without difficulty accompanied by family.
Pt reports pain has resolved.
No lymphadedenopathy

## 2023-01-09 NOTE — H&P PST ADULT - CARDIOVASCULAR
normal/regular rate and rhythm/S1 S2 present/no gallops/no rub/no murmur negative normal/regular rate and rhythm/S1 S2 present/no gallops/no rub/no murmur/no JVD/normal PMI/no pedal edema/irregular rate and rhythm/vascular

## 2023-01-09 NOTE — H&P PST ADULT - REASON FOR ADMISSION
Case Type: OP   Suite: Interventional Radiology  Proceduralist: Jignesh Corbin  Confirmed Surgery Date Time: 01-   PAST Date Time: 01- - 0:00  Procedure: Inferior Vena Cava Filter Removal  Laterality: N/A  Length of Procedure: 60 Minutes  Anesthesia Type: Local Standby  Covid Testing 1/14/2023

## 2023-01-09 NOTE — H&P PST ADULT - MUSCULOSKELETAL
negative normal/ROM intact/normal gait/strength 5/5 bilateral upper extremities/strength 5/5 bilateral lower extremities normal/ROM intact/no joint swelling/no joint erythema/normal gait/strength 5/5 bilateral upper extremities/strength 5/5 bilateral lower extremities

## 2023-01-09 NOTE — H&P PST ADULT - NSICDXPASTMEDICALHX_GEN_ALL_CORE_FT
PAST MEDICAL HISTORY:  Skin cancer      PAST MEDICAL HISTORY:  H/O vasculitis     History of pseudoaneurysm     Skin cancer

## 2023-01-09 NOTE — H&P PST ADULT - NEUROLOGICAL
normal/cranial nerves II-XII intact/sensation intact/responds to pain/responds to verbal commands negative normal/cranial nerves II-XII intact/sensation intact/responds to pain/responds to verbal commands/deep reflexes intact

## 2023-01-09 NOTE — H&P PST ADULT - NSICDXFAMILYHX_GEN_ALL_CORE_FT
FAMILY HISTORY:  Father  Still living? Unknown  Family history of diabetes mellitus (DM), Age at diagnosis: Age Unknown    Mother  Still living? Unknown  Family history of diabetes mellitus (DM), Age at diagnosis: Age Unknown    Sibling  Still living? No  Family history of diabetes mellitus (DM), Age at diagnosis: Age Unknown  FHx: brain aneurysm, Age at diagnosis: Age Unknown

## 2023-01-09 NOTE — H&P PST ADULT - NSICDXPASTSURGICALHX_GEN_ALL_CORE_FT
PAST SURGICAL HISTORY:  Pearl filter in place     S/P excision of lipoma     S/P skin cancer resection      PAST SURGICAL HISTORY:  Kendall filter in place     Mesenteric arterial embolization     S/P excision of lipoma     S/P skin cancer resection

## 2023-01-09 NOTE — H&P PST ADULT - HISTORY OF PRESENT ILLNESS
KEN ROBERSON is a 66 yo female with PMH of AAA     Patient denies any cp, sob, palpitations, fever, cough, URI, abdominal pains, N/V, UTI, Rashes or open wounds.  As per patient exercise tolerance of 4 fos walks with out sob  Patient denies any s/s covid 19 and reports no contact with known positive people. Patient has appointment for repeat covid testing pre op and instructed to continue to self monitor and report any concerns to MD. Pt will continue to practice self isolation and  exposure control measures pre op.  Anesthesia Alert  NO--Difficult Airway  NO--History of neck surgery or radiation  NO--Limited ROM of neck  NO--History of Malignant hyperthermia  NO--Personal or family history of Pseudocholinesterase deficiency  NO--Prior Anesthesia Complication  NO--Latex Allergy  NO--Loose teeth  NO--History of Rheumatoid Arthritis  NO--CARRIE  NO--Risk of Bleedings  Pt instructed to stop vitamins/supplements/herbal medications for one week prior to surgery  As per patient this is the complete medical, surgical history and medications.      KEN ROBERSON is a 66 yo female with no significant PMH who developed sudden onset of left flank pains on 08/23/2022 while watching TV and admitted to [facility] and CT abdomen and pelvis revealed Hemoperitoneum with active extravasation and Hypotensive S/P Code fusion called and received 4 units PRBC,1Unit  FFP 1 U Platelets given and at IR CT Guided coil embolization of the right gastric artery was done. Patient also R/O vasculitis, F/U CTA revealed multiple pseudoaneurysm and extensive acute B/L subsegmental and submental PE in the lungs S/P prophylactic IVC filter done at IR on 08/31/2022.   On 09/1/2022 Patient was D/C'd home with Nifedipine and prednisone.   On 12/10/2022  f/u CTA of chest and Pulmonary  artery shown no evidence of PE or vasculitis and patient scheduled for to remove IVC filter.   Patient denies any cp, sob, palpitations, fever, cough, URI, abdominal pains, N/V, UTI, Rashes or open wounds.  As per patient exercise tolerance of 4 fos walks with out sob  Patient denies any s/s covid 19 and reports no contact with known positive people. Patient has appointment for repeat covid testing pre op and instructed to continue to self monitor and report any concerns to MD. Pt will continue to practice self isolation and  exposure control measures pre op.  Anesthesia Alert  NO--Difficult Airway  NO--History of neck surgery or radiation  NO--Limited ROM of neck  NO--History of Malignant hyperthermia  NO--Personal or family history of Pseudocholinesterase deficiency  NO--Prior Anesthesia Complication  NO--Latex Allergy  NO--Loose teeth. Partial dentures   NO--History of Rheumatoid Arthritis  NO--CARRIE  NO--Risk of Bleedings  Pt instructed to stop vitamins/supplements/herbal medications for one week prior to surgery  As per patient this is the complete medical, surgical history and medications.

## 2023-01-12 NOTE — ASSESSMENT
[FreeTextEntry1] : 68 yo F with history of DVT, PE and IVC filter placement 8/31/22. Patient presents for planned removal of IVC filter. She states she has been doing well and moving around normally. She denies any symptoms of DVT or PE and most recent US in 12/31/22 was negative for any residual thrombus. \par \par Plan for removal of IVC filter in outpatient office\par COVID test prior to procedure\par Discussed plan with patient and she understands and is agreeable to IVC filter removal at this time

## 2023-01-12 NOTE — HISTORY OF PRESENT ILLNESS
[FreeTextEntry1] : 68 yo F with history of DVT, PE and active bleeding from celiac artery presenting for follow up. Patient s/p IVC filter placement on 8/31/22. She states that she has been doing well, walking around and performing her normal activities. Patient denies any SOB, CP, leg swelling, palpitations, abdominal pain, blood in stool or urine or fatigue. Patient with venous duplex 12/31/22, which showed complete resolution of venous thrombus. Patient now here for IVC filter removal. She states she is not currently on any anticoagulation but thinks she may be starting Eliquis once filter is removed.

## 2023-01-12 NOTE — REVIEW OF SYSTEMS
[Fever] : no fever [Chills] : no chills [Feeling Poorly] : not feeling poorly [Feeling Tired] : not feeling tired [Eyesight Problems] : no eyesight problems [Cough] : no cough [SOB on Exertion] : no shortness of breath during exertion [Abdominal Pain] : no abdominal pain [Vomiting] : no vomiting [Constipation] : no constipation [Diarrhea] : no diarrhea [Melena] : no melena [Dizziness] : no dizziness [Fainting] : no fainting [Limb Weakness] : no limb weakness [Difficulty Walking] : no difficulty walking

## 2023-01-12 NOTE — PHYSICAL EXAM
[Alert] : alert [No Acute Distress] : no acute distress [Well Nourished] : well nourished [Well Developed] : well developed [PERRL] : pupils equal, round and reactive to light [EOMI] : extra occular movement intact [Normal Hearing] : hearing was normal [No Respiratory Distress] : no respiratory distress [Normal Rate and Effort] : normal respiratory rhythm and effort [No Accessory Muscle Use] : no accessory muscle use [Normal Rate] : heart rate was normal  [Regular Rhythm] : with a regular rhythm [Normal Gait] : normal gait [No Motor Deficits] : the motor exam was normal [Oriented x3] : oriented to person, place, and time [Normal Insight/Judgement] : insight and judgment were intact [Normal Affect] : the affect was normal [Normal Mood] : the mood was normal [de-identified] : No lower extremity swelling or discoloration

## 2023-01-17 ENCOUNTER — INPATIENT (INPATIENT)
Facility: HOSPITAL | Age: 68
LOS: 0 days | Discharge: HOME | End: 2023-01-18
Attending: INTERNAL MEDICINE | Admitting: INTERNAL MEDICINE
Payer: MEDICARE

## 2023-01-17 VITALS
HEART RATE: 77 BPM | RESPIRATION RATE: 22 BRPM | TEMPERATURE: 98 F | SYSTOLIC BLOOD PRESSURE: 180 MMHG | OXYGEN SATURATION: 100 % | HEIGHT: 67 IN | WEIGHT: 130.07 LBS | DIASTOLIC BLOOD PRESSURE: 73 MMHG

## 2023-01-17 DIAGNOSIS — K55.059 ACUTE (REVERSIBLE) ISCHEMIA OF INTESTINE, PART AND EXTENT UNSPECIFIED: Chronic | ICD-10-CM

## 2023-01-17 DIAGNOSIS — Z98.890 OTHER SPECIFIED POSTPROCEDURAL STATES: Chronic | ICD-10-CM

## 2023-01-17 DIAGNOSIS — Z95.828 PRESENCE OF OTHER VASCULAR IMPLANTS AND GRAFTS: Chronic | ICD-10-CM

## 2023-01-17 PROCEDURE — 75825 VEIN X-RAY TRUNK: CPT | Mod: 26

## 2023-01-17 PROCEDURE — 99223 1ST HOSP IP/OBS HIGH 75: CPT

## 2023-01-17 PROCEDURE — 93010 ELECTROCARDIOGRAM REPORT: CPT

## 2023-01-17 PROCEDURE — 36010 PLACE CATHETER IN VEIN: CPT | Mod: RT

## 2023-01-17 RX ORDER — NIFEDIPINE 30 MG
60 TABLET, EXTENDED RELEASE 24 HR ORAL
Refills: 0 | Status: DISCONTINUED | OUTPATIENT
Start: 2023-01-17 | End: 2023-01-18

## 2023-01-17 RX ORDER — ENOXAPARIN SODIUM 100 MG/ML
40 INJECTION SUBCUTANEOUS EVERY 24 HOURS
Refills: 0 | Status: DISCONTINUED | OUTPATIENT
Start: 2023-01-17 | End: 2023-01-18

## 2023-01-17 RX ORDER — NIFEDIPINE 30 MG
60 TABLET, EXTENDED RELEASE 24 HR ORAL
Refills: 0 | Status: DISCONTINUED | OUTPATIENT
Start: 2023-01-17 | End: 2023-01-17

## 2023-01-17 NOTE — ASU PATIENT PROFILE, ADULT - FALL HARM RISK - UNIVERSAL INTERVENTIONS
Bed in lowest position, wheels locked, appropriate side rails in place/Call bell, personal items and telephone in reach/Instruct patient to call for assistance before getting out of bed or chair/Non-slip footwear when patient is out of bed/[place] to call system/Physically safe environment - no spills, clutter or unnecessary equipment/Purposeful Proactive Rounding/Room/bathroom lighting operational, light cord in reach

## 2023-01-17 NOTE — CONSULT NOTE ADULT - SUBJECTIVE AND OBJECTIVE BOX
HISTORY OF PRESENT ILLNESS:   68yo F hx of vasculitis, hemoperitoneum, DVT/PE s/p IVC filter presented for IVC filter retrieval. Patient noted significant bradycardia HR 10-20 after procedure started. Patient did not     PAST MEDICAL & SURGICAL HISTORY  Skin cancer    History of pseudoaneurysm    H/O vasculitis    S/P skin cancer resection    S/P excision of lipoma    [place] filter in place    Mesenteric arterial embolization        FAMILY HISTORY:  FAMILY HISTORY:  FHx: brain aneurysm (Sibling)    Family history of diabetes mellitus (DM) (Sibling, Father, Mother)        SOCIAL HISTORY:  []smoker  []Alcohol  []Drug    ROS:  Negative except as mentioned in HPI    ALLERGIES:  No Known Allergies      MEDICATIONS:  MEDICATIONS  (STANDING):    MEDICATIONS  (PRN):      HOME MEDICATIONS:  Home Medications:  predniSONE 5 mg oral tablet: 1 tab(s) orally once a day (09 Jan 2023 15:27)      VITALS:   T(F): 97.8 (01-17 @ 14:05), Max: 97.8 (01-17 @ 14:05)  HR: 65 (01-17 @ 14:05) (63 - 77)  BP: 150/85 (01-17 @ 14:05) (148/70 - 180/73)  BP(mean): --  RR: 18 (01-17 @ 14:05) (18 - 22)  SpO2: 99% (01-17 @ 14:05) (99% - 100%)    I&O's Summary      PHYSICAL EXAM:  GEN: Not in acute distress  HEENT: NCAT, PERRL, EOMI  LUNGS: Clear to auscultation bilaterally   CARDIOVASCULAR: RRR, S1/S2 present, no murmurs, rubs or gallops, no JVD, + PP bilaterally  ABD: Soft, non-tender, non-distended  EXT: No OVI  SKIN: Intact  NEURO: AAOx3    LABS:                    Hemoglobin A1C   Thyroid      -CXR:  -TTE:  -CCTA:  -STRESS TEST:  -CATHETERIZATION:  -ECG:   -Telemetry:   HISTORY OF PRESENT ILLNESS:   68yo F hx of vasculitis, hemoperitoneum, DVT/PE s/p IVC filter presented for IVC filter retrieval. Patient noted significant bradycardia HR 10-20 after procedure started. Patient did not experience any symptoms with anesthesia. Procedure was subsequently aborted. In recovery area, patient was noted AOX3. HR recovered to 70s on tele with NSR.     PAST MEDICAL & SURGICAL HISTORY  Skin cancer    History of pseudoaneurysm    H/O vasculitis    S/P skin cancer resection    S/P excision of lipoma    [place] filter in place    Mesenteric arterial embolization        FAMILY HISTORY:  FAMILY HISTORY:  FHx: brain aneurysm (Sibling)    Family history of diabetes mellitus (DM) (Sibling, Father, Mother)        SOCIAL HISTORY:  []smoker  []Alcohol  []Drug    ROS:  Negative except as mentioned in HPI    ALLERGIES:  No Known Allergies      MEDICATIONS:  MEDICATIONS  (STANDING):    MEDICATIONS  (PRN):      HOME MEDICATIONS:  Home Medications:  predniSONE 5 mg oral tablet: 1 tab(s) orally once a day (09 Jan 2023 15:27)      VITALS:   T(F): 97.8 (01-17 @ 14:05), Max: 97.8 (01-17 @ 14:05)  HR: 65 (01-17 @ 14:05) (63 - 77)  BP: 150/85 (01-17 @ 14:05) (148/70 - 180/73)  BP(mean): --  RR: 18 (01-17 @ 14:05) (18 - 22)  SpO2: 99% (01-17 @ 14:05) (99% - 100%)    I&O's Summary      PHYSICAL EXAM:  GEN: Not in acute distress  HEENT: NCAT, PERRL, EOMI  LUNGS: Clear to auscultation bilaterally   CARDIOVASCULAR: RRR, S1/S2 present, no murmurs, rubs or gallops, no JVD, + PP bilaterally  ABD: Soft, non-tender, non-distended  EXT: No OVI  SKIN: Intact  NEURO: AAOx3    EKG: sinus christopher HR 57    Tele: NSR 70s HISTORY OF PRESENT ILLNESS:   66yo F hx of vasculitis, hemoperitoneum, DVT/PE s/p IVC filter presented for IVC filter retrieval. Patient noted significant bradycardia HR 10-20 after procedure started. Patient did not experience any symptoms with anesthesia. Procedure was subsequently aborted. In recovery area, patient was noted AOX3. HR recovered to 70s on tele with NSR.       PAST MEDICAL & SURGICAL HISTORY  Skin cancer    History of pseudoaneurysm    H/O vasculitis    S/P skin cancer resection    S/P excision of lipoma    [place] filter in place    Mesenteric arterial embolization        FAMILY HISTORY:  FHx: brain aneurysm (Sibling)  Family history of diabetes mellitus (DM) (Sibling, Father, Mother)    SOCIAL HISTORY: Denies EtOH, smoking or drug use    ROS:  Negative except as mentioned in HPI    ALLERGIES:  No Known Allergies      HOME MEDICATIONS:  Home Medications:  predniSONE 5 mg oral tablet: 1 tab(s) orally once a day (09 Jan 2023 15:27)      VITALS:   T(F): 97.8 (01-17 @ 14:05), Max: 97.8 (01-17 @ 14:05)  HR: 65 (01-17 @ 14:05) (63 - 77)  BP: 150/85 (01-17 @ 14:05) (148/70 - 180/73)  BP(mean): --  RR: 18 (01-17 @ 14:05) (18 - 22)  SpO2: 99% (01-17 @ 14:05) (99% - 100%)        PHYSICAL EXAM:  GEN: NAD  HEENT: NCAT, EOMI  LUNGS: Clear to auscultation bilaterally   CARDIOVASCULAR: RRR, S1/S2 present, no murmurs, no LE edema  ABD: Soft, non-tender, non-distended  EXT: No OVI  SKIN: Intact  NEURO: AAOx3    EKG: sinus christopher HR 57    Tele: NSR 70s

## 2023-01-17 NOTE — CONSULT NOTE ADULT - ASSESSMENT
Bradycardia    - Unclear etiology or mechanism for bradycardia. No rhythm strip from the incident recorded.  - Currently HD stable with recovered HR.  - Check TSH.  - Check TTE.  - Monitor on tele for next 24h.  - Consider EP loop recorder prior to discharge.  - Will discuss plan with attending. Bradycardia    - Unclear etiology or mechanism for bradycardia.  - No rhythm strip from the incident recorded.  - Currently HD stable with recovered HR.  - Check TSH.  - Check TTE.  - Monitor on tele for next 24h.  - Consider EP loop recorder prior to discharge.  - Will discuss plan with attending.

## 2023-01-17 NOTE — H&P ADULT - HISTORY OF PRESENT ILLNESS
Pt is a 68 yo F with PMHx of vasculitis, and recent hospitalization in 8/2022 for hemoperitoneum of celiac artery s/p IR guided coil embolization of R gastric artery, complicated by multiple pseudoaneurysms and extensive acute bilateral subsegmental and submental PE in the lungs s/p IVC filter placement by IR on 8/31, presented to IR today for IVC filter removal. As per the charts, pt was noted to have significant bradycardia of HR 10-20 after procedure started and so procedure was subsequently aborted and IVC filter left in place. HR recovered to 70s in PACU, in NSR and no prior strip of the bradycardia was found. Pt does not remember what happened during that time, was placed under some anesthesia. Currently, pt denies any headaches, dizziness, palpitations, SOB, chest pain, abdominal pain.     T(C): 36.1 (17 Jan 2023 16:00), Max: 36.6 (17 Jan 2023 14:05)  T(F): 97 (17 Jan 2023 16:00), Max: 97.8 (17 Jan 2023 14:05)  HR: 55 (17 Jan 2023 20:32) (55 - 78)  BP: 140/77 (17 Jan 2023 20:32) (138/66 - 180/73)  RR: 18 (17 Jan 2023 20:32) (18 - 22)  SpO2: 99% (17 Jan 2023 15:00) (99% - 100%)    Labs notable for: Hgb 16.4  CTA chest and abdomen showed resolution of PE, resolution of hemoperitoneum and no evidence of DVT  ECG showed sinus christopher to 57, TWI in V2  Was evaluated by cardiology and directly admitted to [address] [place] [address].

## 2023-01-17 NOTE — PATIENT PROFILE ADULT - FALL HARM RISK - HARM RISK INTERVENTIONS

## 2023-01-17 NOTE — H&P ADULT - NSICDXPASTSURGICALHX_GEN_ALL_CORE_FT
PAST SURGICAL HISTORY:  [place] filter in place     Mesenteric arterial embolization     S/P excision of lipoma     S/P skin cancer resection

## 2023-01-17 NOTE — H&P ADULT - ATTENDING COMMENTS
68 yo F with PMHx of vasculitis, and recent hospitalization in 8/2022 for hemoperitoneum of celiac artery s/p IR guided coil embolization of R gastric artery, complicated by multiple pseudoaneurysms and extensive acute bilateral subsegmental and submental PE in the lungs s/p IVC filter placement by IR on 8/31, presented to IR today for IVC filter removal. Patient had episode of bradycardia during the procedure, procedure was aborted and patient was sent to ED, in the ED HR was normal , EKG showed sinus rhythm.   Patient was admitted for further evaluation.     PHYSICAL EXAM:  GENERAL: NAD, well-developed.  HEAD:  Atraumatic, Normocephalic.  EYES: EOMI, PERRLA, conjunctiva and sclera clear.  NECK: Supple, No JVD.  CHEST/LUNG: Clear to auscultation bilaterally; No wheeze.  HEART: Regular rate and rhythm; S1 S2.   ABDOMEN: Soft, Nontender, Nondistended; Bowel sounds present.  EXTREMITIES:  2+ Peripheral Pulses, No clubbing, cyanosis, or edema.  PSYCH: AAOx3.  NEUROLOGY: non-focal.  SKIN: No rashes or lesions.    Sinus bradycardia during anesthesia:   EKG showed sinus rhythm, admit to telemetry.   - tele monitor: currently pt's HR is in lows 60s, BP stable  - check TSH, echo, EP consult.     #vasculitis  #multiple pseudoaneurysms  - continue with home prednisone 5mg daily  - continue with nifedipine 60mg BID with holding parameters    Patient was seen by attending on 1/18/23

## 2023-01-17 NOTE — H&P ADULT - NSHPPHYSICALEXAM_GEN_ALL_CORE
T(C): 36.1 (01-17-23 @ 16:00), Max: 36.6 (01-17-23 @ 14:05)  HR: 55 (01-17-23 @ 20:32) (55 - 78)  BP: 140/77 (01-17-23 @ 20:32) (138/66 - 180/73)  RR: 18 (01-17-23 @ 20:32) (18 - 22)  SpO2: 99% (01-17-23 @ 15:00) (99% - 100%)    PHYSICAL EXAM:  GENERAL: patient appears well, no acute distress, appropriate, pleasant  EYES: sclera clear, no exudates  ENMT: oropharynx clear without erythema, no exudates, moist mucous membranes  NECK: supple, soft, no thyromegaly noted  LUNGS: good air entry bilaterally, clear to auscultation, symmetric breath sounds, no wheezing or rhonchi appreciated  HEART: soft S1/S2, regular rate and rhythm, no murmurs noted, no lower extremity edema  GASTROINTESTINAL: abdomen is soft, nontender, nondistended, normoactive bowel sounds, no palpable masses  INTEGUMENT: good skin turgor, no lesions noted  MUSCULOSKELETAL: no clubbing or cyanosis, no obvious deformity  NEUROLOGIC: awake, alert, oriented x3, good muscle tone in 4 extremities, no obvious sensory deficits

## 2023-01-17 NOTE — H&P ADULT - ASSESSMENT
Pt is a 68 yo F with PMHx of vasculitis, and recent hospitalization in 8/2022 for hemoperitoneum of celiac artery s/p IR guided coil embolization of R gastric artery, complicated by multiple pseudoaneurysms and extensive acute bilateral subsegmental and submental PE in the lungs s/p IVC filter placement by IR on 8/31, presented to IR today for IVC filter removal.    #significant sinus bradycardia intraop  *consider anesthesia effect vs SSS vs thyroid disease  - reportedly HR dropped to 5-10 with stable BP  - evaluated by cardiology  - tele monitor: currently pt's HR is in lows 60s, BP stable  - check TSH  - check Echo  - continue to monitor on tele for at least 24 hrs  - EP consult for possible ILR prior to discharge  - monitor HR    #vasculitis  #multiple pseudoaneurysms  - continue with home prednisone 5mg daily  - continue with nifedipine 60mg BID PRN for SBP >140 for BP control    #h/o hemoperitoneum of celiac artery- since resolved  #extensive PE hx s/p IVC filter placement pending IVC filter removal  - CTA: resolved PE  - IR Follow up once HR remains stable and no tele events about IVC filter removal either inpatient vs outpatient follow up    #Misc:  - DVT ppx: lovenox  - Diet: DASH  - Activity: AAT  - Dispo: admit to tele        Pt is a 68 yo F with PMHx of vasculitis, and recent hospitalization in 8/2022 for hemoperitoneum of celiac artery s/p IR guided coil embolization of R gastric artery, complicated by multiple pseudoaneurysms and extensive acute bilateral subsegmental and submental PE in the lungs s/p IVC filter placement by IR on 8/31, presented to IR today for IVC filter removal.    #significant sinus bradycardia intraop  *consider anesthesia effect vs SSS vs thyroid disease  - reportedly HR dropped to 5-10 with stable BP  - evaluated by cardiology  - tele monitor: currently pt's HR is in lows 60s, BP stable  - check TSH  - check Echo  - continue to monitor on tele for at least 24 hrs  - EP consult for possible ILR prior to discharge  - monitor HR    #vasculitis  #multiple pseudoaneurysms  - continue with home prednisone 5mg daily  - continue with nifedipine 60mg BID with holding parameters    #h/o hemoperitoneum of celiac artery- since resolved  #extensive PE hx s/p IVC filter placement pending IVC filter removal  - CTA: resolved PE  - IR Follow up once HR remains stable and no tele events about IVC filter removal either inpatient vs outpatient follow up    #Misc:  - DVT ppx: lovenox  - Diet: DASH. NPO after MN in case of any ILR plans  - Activity: AAT  - Dispo: admit to tele

## 2023-01-17 NOTE — ASU DISCHARGE PLAN (ADULT/PEDIATRIC) - NS MD DC FALL RISK RISK
For information on Fall & Injury Prevention, visit: [email or web address] OR  [email or web address] OR  https://www.cdc.gov/steadi/patient.html

## 2023-01-17 NOTE — CONSULT NOTE ADULT - ATTENDING COMMENTS
Patient seen / examined and plan amended above    Reported bradycardia 10-20 bpm under sedation after sheath insertion from the right IJ  No rhythm strip available    - Admit to Telemetry for 24-48 hrs  - EP consult for MCOT/ILR prior to discharge  - TTE

## 2023-01-17 NOTE — PROCEDURE NOTE - PROCEDURE FINDINGS AND DETAILS
Inferior venacavogram performed demonstrating an IVC filter without intra caval thrombus. Patient developed sinus bradycardia (HR-5-10) without drop in BP after the sheath was inserted into the IVC from the right IJ access site. The procedure was subsequently aborted and the IVC filter was left in place.     12 lead EKG was obtained and outpatient cardiology consult will be requested.

## 2023-01-17 NOTE — H&P ADULT - NSHPLABSRESULTS_GEN_ALL_CORE
< from: US Duplex Venous Lower Ext Complete, Bilateral (12.30.22 @ 13:23) >    MPRESSION:  No evidence of deep venous thrombosis in either lower extremity.    < end of copied text >    < from: CT Angio Chest PE Protocol w/ IV Cont (12.16.22 @ 11:58) >    IMPRESSION:    No CTA evidence of acute pulmonary embolus. Resolution of previously seen   pulmonary emboli.    Normal caliber thoracic aorta. No significant thoracic aortic or arch   vessel wall irregularity. Pulmonary arteries are unremarkable.    Scattered areas of tree-in-bud nodularity with peripheral endobronchial   mucoid impaction, consistent with small airways disease.      < end of copied text >

## 2023-01-18 ENCOUNTER — TRANSCRIPTION ENCOUNTER (OUTPATIENT)
Age: 68
End: 2023-01-18

## 2023-01-18 ENCOUNTER — RX RENEWAL (OUTPATIENT)
Age: 68
End: 2023-01-18

## 2023-01-18 VITALS
DIASTOLIC BLOOD PRESSURE: 60 MMHG | TEMPERATURE: 97 F | SYSTOLIC BLOOD PRESSURE: 131 MMHG | RESPIRATION RATE: 18 BRPM | HEART RATE: 80 BPM

## 2023-01-18 LAB
ALBUMIN SERPL ELPH-MCNC: 4.1 G/DL — SIGNIFICANT CHANGE UP (ref 3.5–5.2)
ALP SERPL-CCNC: 63 U/L — SIGNIFICANT CHANGE UP (ref 30–115)
ALT FLD-CCNC: 15 U/L — SIGNIFICANT CHANGE UP (ref 0–41)
ANION GAP SERPL CALC-SCNC: 9 MMOL/L — SIGNIFICANT CHANGE UP (ref 7–14)
AST SERPL-CCNC: 12 U/L — SIGNIFICANT CHANGE UP (ref 0–41)
BASOPHILS # BLD AUTO: 0.06 K/UL — SIGNIFICANT CHANGE UP (ref 0–0.2)
BASOPHILS NFR BLD AUTO: 1.1 % — HIGH (ref 0–1)
BILIRUB SERPL-MCNC: 0.5 MG/DL — SIGNIFICANT CHANGE UP (ref 0.2–1.2)
BUN SERPL-MCNC: 19 MG/DL — SIGNIFICANT CHANGE UP (ref 10–20)
CALCIUM SERPL-MCNC: 9.9 MG/DL — SIGNIFICANT CHANGE UP (ref 8.4–10.5)
CHLORIDE SERPL-SCNC: 107 MMOL/L — SIGNIFICANT CHANGE UP (ref 98–110)
CO2 SERPL-SCNC: 29 MMOL/L — SIGNIFICANT CHANGE UP (ref 17–32)
CREAT SERPL-MCNC: 0.7 MG/DL — SIGNIFICANT CHANGE UP (ref 0.7–1.5)
EGFR: 94 ML/MIN/1.73M2 — SIGNIFICANT CHANGE UP
EOSINOPHIL # BLD AUTO: 0.14 K/UL — SIGNIFICANT CHANGE UP (ref 0–0.7)
EOSINOPHIL NFR BLD AUTO: 2.6 % — SIGNIFICANT CHANGE UP (ref 0–8)
GLUCOSE SERPL-MCNC: 103 MG/DL — HIGH (ref 70–99)
HCT VFR BLD CALC: 46.8 % — SIGNIFICANT CHANGE UP (ref 37–47)
HGB BLD-MCNC: 15.6 G/DL — SIGNIFICANT CHANGE UP (ref 12–16)
IMM GRANULOCYTES NFR BLD AUTO: 0.2 % — SIGNIFICANT CHANGE UP (ref 0.1–0.3)
LYMPHOCYTES # BLD AUTO: 1.4 K/UL — SIGNIFICANT CHANGE UP (ref 1.2–3.4)
LYMPHOCYTES # BLD AUTO: 25.8 % — SIGNIFICANT CHANGE UP (ref 20.5–51.1)
MAGNESIUM SERPL-MCNC: 2.1 MG/DL — SIGNIFICANT CHANGE UP (ref 1.8–2.4)
MCHC RBC-ENTMCNC: 29.4 PG — SIGNIFICANT CHANGE UP (ref 27–31)
MCHC RBC-ENTMCNC: 33.3 G/DL — SIGNIFICANT CHANGE UP (ref 32–37)
MCV RBC AUTO: 88.1 FL — SIGNIFICANT CHANGE UP (ref 81–99)
MONOCYTES # BLD AUTO: 0.54 K/UL — SIGNIFICANT CHANGE UP (ref 0.1–0.6)
MONOCYTES NFR BLD AUTO: 9.9 % — HIGH (ref 1.7–9.3)
NEUTROPHILS # BLD AUTO: 3.28 K/UL — SIGNIFICANT CHANGE UP (ref 1.4–6.5)
NEUTROPHILS NFR BLD AUTO: 60.4 % — SIGNIFICANT CHANGE UP (ref 42.2–75.2)
NRBC # BLD: 0 /100 WBCS — SIGNIFICANT CHANGE UP (ref 0–0)
PLATELET # BLD AUTO: 169 K/UL — SIGNIFICANT CHANGE UP (ref 130–400)
POTASSIUM SERPL-MCNC: 4.5 MMOL/L — SIGNIFICANT CHANGE UP (ref 3.5–5)
POTASSIUM SERPL-SCNC: 4.5 MMOL/L — SIGNIFICANT CHANGE UP (ref 3.5–5)
PROT SERPL-MCNC: 5.8 G/DL — LOW (ref 6–8)
RBC # BLD: 5.31 M/UL — SIGNIFICANT CHANGE UP (ref 4.2–5.4)
RBC # FLD: 12.9 % — SIGNIFICANT CHANGE UP (ref 11.5–14.5)
SODIUM SERPL-SCNC: 145 MMOL/L — SIGNIFICANT CHANGE UP (ref 135–146)
TROPONIN T SERPL-MCNC: <0.01 NG/ML — SIGNIFICANT CHANGE UP
TSH SERPL-MCNC: 1.81 UIU/ML — SIGNIFICANT CHANGE UP (ref 0.27–4.2)
WBC # BLD: 5.43 K/UL — SIGNIFICANT CHANGE UP (ref 4.8–10.8)
WBC # FLD AUTO: 5.43 K/UL — SIGNIFICANT CHANGE UP (ref 4.8–10.8)

## 2023-01-18 PROCEDURE — 93306 TTE W/DOPPLER COMPLETE: CPT | Mod: 26

## 2023-01-18 PROCEDURE — 99223 1ST HOSP IP/OBS HIGH 75: CPT

## 2023-01-18 RX ADMIN — Medication 5 MILLIGRAM(S): at 06:08

## 2023-01-18 RX ADMIN — Medication 60 MILLIGRAM(S): at 06:08

## 2023-01-18 NOTE — CONSULT NOTE ADULT - ASSESSMENT
66 yo F with PMHx of vasculitis, recent IVC filter placement in Aug 2022 who presented for IVC Filter removal.  When sheath was advanced via Right IJ, pt became bradycardic to 10-20 BPM with normal BP.  Pt was under anesthesia so uncertain if she was symptomatic.  EP was consulted for Loop recorder    Vasculitis  Recent PE  IVC filter  Transient bradycardia due to manipulation    Plan:  Follow up 2d echo results 68 yo F with PMHx of vasculitis, recent IVC filter placement in Aug 2022 who presented for IVC Filter removal.  When sheath was advanced via Right IJ, pt became bradycardic to 10-20 BPM with normal BP.  Pt was under anesthesia so uncertain if she was symptomatic.  EP was consulted for Loop recorder    Vasculitis  Recent PE  IVC filter  Transient bradycardia due to manipulation    Plan:  Follow up 2d echo results  Recommend MCOT before discharge (I will place this afternoon) 66 yo F with PMHx of vasculitis, recent IVC filter placement in Aug 2022 who presented for IVC Filter removal.  When sheath was advanced via Right IJ, pt became bradycardic to 10-20 BPM with normal BP.  Pt was under anesthesia so uncertain if she was symptomatic.  EP was consulted for Loop recorder    Vasculitis  Recent PE  IVC filter  Transient bradycardia due to manipulation    Plan:  Follow up 2d echo results  Recommend MCOT.  MCOT placed on pt.  Instructions provided  ok to d/c from EP standpoint  F/U with Dr. Cook in 6 weeks

## 2023-01-18 NOTE — DISCHARGE NOTE NURSING/CASE MANAGEMENT/SOCIAL WORK - PATIENT PORTAL LINK FT
You can access the FollowMyHealth Patient Portal offered by [facility] by registering at the following website: [email or web address]. By joining [facility]’s FollowMyHealth portal, you will also be able to view your health information using other applications (apps) compatible with our system.

## 2023-01-18 NOTE — DISCHARGE NOTE PROVIDER - NSDCFUSCHEDAPPT_GEN_ALL_CORE_FT
Loree Nichole  [facility]  [address]  Scheduled Appointment: 01/24/2023

## 2023-01-18 NOTE — DISCHARGE NOTE PROVIDER - NSDCMRMEDTOKEN_GEN_ALL_CORE_FT
NIFEdipine 60 mg oral tablet, extended release: 1 tab(s) orally 2 times a day, As Needed  for BP Systolic &gt;140  predniSONE 5 mg oral tablet: 1 tab(s) orally once a day

## 2023-01-18 NOTE — DISCHARGE NOTE PROVIDER - HOSPITAL COURSE
Pt is a 66 yo F with PMHx of vasculitis, and recent hospitalization in 8/2022 for hemoperitoneum of celiac artery s/p IR guided coil embolization of R gastric artery, complicated by multiple pseudoaneurysms and extensive acute bilateral subsegmental and submental PE in the lungs s/p IVC filter placement by IR on 8/31, presented to IR today for IVC filter removal. As per the charts, pt was noted to have significant bradycardia of HR 10-20 after procedure started and so procedure was subsequently aborted and IVC filter left in place. HR recovered to 70s in PACU, in NSR and no prior strip of the bradycardia was found. Pt does not remember what happened during that time, was placed under some anesthesia.   Patient admitted to tele. Cardiology consulted :   - Unclear etiology or mechanism for bradycardia.  - No rhythm strip from the incident recorded.  - Currently HD stable with recovered HR.  - Check TSH.  - TTE done:   - Monitor on tele for next 24h: no events on tele     EP consulted, loop recorder placed   Patient was seen and examined today at bedside. She is stable, no complaints, and ready for discharge.  She needs to follow up with EP in 3-4 weeks.    Pt is a 66 yo F with PMHx of vasculitis, and recent hospitalization in 8/2022 for hemoperitoneum of celiac artery s/p IR guided coil embolization of R gastric artery, complicated by multiple pseudoaneurysms and extensive acute bilateral subsegmental and submental PE in the lungs s/p IVC filter placement by IR on 8/31, presented to IR today for IVC filter removal. As per the charts, pt was noted to have significant bradycardia of HR 10-20 after procedure started and so procedure was subsequently aborted and IVC filter left in place. HR recovered to 70s in PACU, in NSR and no prior strip of the bradycardia was found. Pt does not remember what happened during that time, was placed under some anesthesia.   Patient admitted to tele. Cardiology consulted :   - Unclear etiology or mechanism for bradycardia.  - No rhythm strip from the incident recorded.  - Currently HD stable with recovered HR.  - Check TSH.  - TTE done: EF 55-60%  - Monitor on tele for next 24h: no events on tele     EP consulted, MCOT placed    Patient was seen and examined today at bedside. She is stable, no complaints, and ready for discharge.  She needs to follow up with Dr. Jordan in 6 weeks

## 2023-01-18 NOTE — DISCHARGE NOTE NURSING/CASE MANAGEMENT/SOCIAL WORK - NSDCPEFALRISK_GEN_ALL_CORE
For information on Fall & Injury Prevention, visit: [email or web address] OR  [email or web address] OR  https://www.cdc.gov/steadi/patient.html

## 2023-01-18 NOTE — DISCHARGE NOTE PROVIDER - NSDCCPCAREPLAN_GEN_ALL_CORE_FT
PRINCIPAL DISCHARGE DIAGNOSIS  Diagnosis: Bradycardia  Assessment and Plan of Treatment: You were admitted because your heart rate  during the procedure for IVC filter removal yesterday. You were monitored for 24 hrs, cardiology evaluated you. Currently you are asymptomatic ans HR is good. A loop recorder was placed which is a device that will monitor your cardiac activity and detectany arrhythmias. You need to follow up with the EP doctor in 3-4 weeks. Please call the provided number to schedule an appointment.   Lyndsay return to ED if you fell stefani chest pain, shortness of breath, dizziness, syncope.       PRINCIPAL DISCHARGE DIAGNOSIS  Diagnosis: Bradycardia  Assessment and Plan of Treatment: You were admitted because your heart rate  during the procedure for IVC filter removal yesterday. You were monitored for 24 hrs, cardiology evaluated you. Currently you are asymptomatic ans HR is good. A loop recorder was placed which is a device that will monitor your cardiac activity and detectany arrhythmias. You need to follow up with Nikki Hess in 6 weeks. Please call the provided number to schedule an appointment.   Please follow up with IR again regarding your IVC filter   Lyndsay return to ED if you fell stefani chest pain, shortness of breath, dizziness, syncope.

## 2023-01-18 NOTE — DISCHARGE NOTE PROVIDER - CARE PROVIDER_API CALL
Eagle Jordan (MD)  Cardiac Electrophysiology; Cardiovascular Disease; [facility]  [address] [place] [address]  Phone: [phone]  Fax: [phone]  Follow Up Time: 1 month

## 2023-01-18 NOTE — CONSULT NOTE ADULT - SUBJECTIVE AND OBJECTIVE BOX
Patient is a 67y old  Female who presents with a chief complaint of bradycardia during IVC filter removal (18 Jan 2023 10:25)      HPI: 66 yo F with PMHx of vasculitis, and recent hospitalization in 8/2022 for hemoperitoneum of celiac artery s/p IR guided coil embolization of R gastric artery, complicated by multiple pseudoaneurysms and extensive acute bilateral subsegmental and submental PE in the lungs s/p IVC filter placement by IR on 8/31, presented to IR yesterday for IVC filter removal. As per the charts, pt was noted to have significant bradycardia of HR 10-20 after procedure started and so procedure was subsequently aborted and IVC filter left in place. HR recovered to 70s in PACU, in NSR and no prior strip of the bradycardia was found      PAST MEDICAL & SURGICAL HISTORY:  Skin cancer      History of pseudoaneurysm      H/O vasculitis      S/P skin cancer resection      S/P excision of lipoma      [place] filter in place      Mesenteric arterial embolization                      PREVIOUS DIAGNOSTIC TESTING:      ECHO  FINDINGS:    STRESS  FINDINGS:    CATHETERIZATION  FINDINGS:    ELECTROPHYSIOLOGY STUDY  FINDINGS:    CAROTID ULTRASOUND:  FINDINGS    VENOUS DUPLEX SCAN:  FINDINGS:    CHEST CT PULMONARY ANGIO with IV Contrast:  FINDINGS:    MEDICATIONS  (STANDING):  enoxaparin Injectable 40 milliGRAM(s) SubCutaneous every 24 hours  NIFEdipine XL 60 milliGRAM(s) Oral two times a day  predniSONE   Tablet 5 milliGRAM(s) Oral daily    MEDICATIONS  (PRN):      FAMILY HISTORY:  FHx: brain aneurysm (Sibling)    Family history of diabetes mellitus (DM) (Sibling, Father, Mother)        SOCIAL HISTORY:    CIGARETTES:    ALCOHOL:    Past Surgical History:    Allergies:    No Known Allergies      REVIEW OF SYSTEMS:    CONSTITUTIONAL: No fever, weight loss, chills, shakes, or fatigue  EYES: No eye pain, visual disturbances, or discharge  ENMT:  No difficulty hearing, tinnitus, vertigo; No sinus or throat pain  NECK: No pain or stiffness  BREASTS: No pain, masses, or nipple discharge  RESPIRATORY: No cough, wheezing, hemoptysis, or shortness of breath  CARDIOVASCULAR: No chest pain, dyspnea, palpitations, dizziness, syncope, paroxysmal nocturnal dyspnea, orthopnea, or arm or leg swelling  GASTROINTESTINAL: No abdominal  or epigastric pain, nausea, vomiting, hematemesis, diarrhea, constipation, melena or bright red blood.  GENITOURINARY: No dysuria, nocturia, hematuria, or urinary incontinence  NEUROLOGICAL: No headaches, memory loss, slurred speech, limb weakness, loss of strength, numbness, or tremors  SKIN: No itching, burning, rashes, or lesions   LYMPH NODES: No enlarged glands  ENDOCRINE: No heat or cold intolerance, or hair loss  MUSCULOSKELETAL: No joint pain or swelling, muscle, back, or extremity pain  PSYCHIATRIC: No depression, anxiety, or difficulty sleeping  HEME/LYMPH: No easy bruising or bleeding gums  ALLERY AND IMMUNOLOGIC: No hives or rash.      Vital Signs Last 24 Hrs  T(C): 36.1 (18 Jan 2023 09:55), Max: 36.6 (17 Jan 2023 14:05)  T(F): 97 (18 Jan 2023 09:55), Max: 97.8 (17 Jan 2023 14:05)  HR: 66 (18 Jan 2023 09:55) (55 - 78)  BP: 144/72 (18 Jan 2023 09:55) (138/66 - 162/63)  BP(mean): --  RR: 18 (18 Jan 2023 09:55) (18 - 20)  SpO2: 99% (17 Jan 2023 15:00) (99% - 99%)        PHYSICAL EXAM:        GENERAL: In no apparent distress, well nourished, and hydrated.  HEAD:  Atraumatic, Normocephalic  EYES: EOMI, PERRLA, conjunctiva and sclera clear  ENMT: No tonsillar erythema, exudates, or enlargements; ist mucous membranes, Good dentition, No lesions  NECK: Supple and normal thyroid.  No JVD or carotid bruit.  Carotid pulse is 2+ bilaterally.  HEART: Regular rate and rhythm; No murmurs, rubs, or gallops.  PULMONARY: Clear to auscultation and perfusion.  No rales, wheezing, or rhonchi bilaterally.  ABDOMEN: Soft, Nontender, Nondistended; Bowel sounds present  EXTREMITIES:  2+ Peripheral Pulses, No clubbing, cyanosis, or edema  LYMPH: No lymphadenopathy noted  NEUROLOGICAL: Grossly nonfocal      INTERPRETATION OF TELEMETRY:    ECG:    I&O's Detail      LABS:                        15.6   5.43  )-----------( 169      ( 18 Jan 2023 06:23 )             46.8     01-18    145  |  107  |  19  ----------------------------<  103<H>  4.5   |  29  |  0.7    Ca    9.9      18 Jan 2023 06:23  Mg     2.1     01-18    TPro  5.8<L>  /  Alb  4.1  /  TBili  0.5  /  DBili  x   /  AST  12  /  ALT  15  /  AlkPhos  63  01-18    CARDIAC MARKERS ( 18 Jan 2023 06:23 )  x     / <0.01 ng/mL / x     / x     / x              BNP  I&O's Detail    Daily Height in cm: 172.7 (18 Jan 2023 09:55)    Daily     RADIOLOGY & ADDITIONAL STUDIES: Patient is a 67y old  Female who presents with a chief complaint of bradycardia during IVC filter removal (18 Jan 2023 10:25)      HPI: 68 yo F with PMHx of vasculitis, and recent hospitalization in 8/2022 for hemoperitoneum of celiac artery s/p IR guided coil embolization of R gastric artery, complicated by multiple pseudoaneurysms and extensive acute bilateral subsegmental and submental PE in the lungs s/p IVC filter placement by IR on 8/31, presented to IR yesterday for IVC filter removal. As per the IR, wire was introduced via Right IJ.  Pt became intermittently bradycardic and then when sheath was advanced, HR dropped to 10-20.  Procedure was aborted. There are no rhythm strips of bradycardic episode. HR recovered to NSR in the 70s while she was in PACU.  Pt was admitted to [facility] for monitoring overnight.  Pt has been in NSR overnight with no episodes of bradycardia.  She denies any cardiac history.  Never had low heart rates in the past.  She denies history of syncope.    EP was consulted for ILR    PAST MEDICAL & SURGICAL HISTORY:  Skin cancer    History of pseudoaneurysm    H/O vasculitis      S/P skin cancer resection      S/P excision of lipoma      [place] filter in place      Mesenteric arterial embolization    PREVIOUS DIAGNOSTIC TESTING:      ECHO  FINDINGS:  pending    MEDICATIONS  (STANDING):  enoxaparin Injectable 40 milliGRAM(s) SubCutaneous every 24 hours  NIFEdipine XL 60 milliGRAM(s) Oral two times a day  predniSONE   Tablet 5 milliGRAM(s) Oral daily    MEDICATIONS  (PRN):    FAMILY HISTORY:  FHx: brain aneurysm (Sibling)    Family history of diabetes mellitus (DM) (Sibling, Father, Mother)    SOCIAL HISTORY:    CIGARETTES: denies    ALCOHOL: denies    Past Surgical History:    Allergies:    No Known Allergies      REVIEW OF SYSTEMS:    CONSTITUTIONAL: No fever, weight loss, chills, shakes, or fatigue  EYES: No eye pain, visual disturbances, or discharge  ENMT:  No difficulty hearing, tinnitus, vertigo; No sinus or throat pain  NECK: No pain or stiffness  BREASTS: No pain, masses, or nipple discharge  RESPIRATORY: No cough, wheezing, hemoptysis, or shortness of breath  CARDIOVASCULAR: No chest pain, dyspnea, palpitations, dizziness, syncope, paroxysmal nocturnal dyspnea, orthopnea, or arm or leg swelling  GASTROINTESTINAL: No abdominal  or epigastric pain, nausea, vomiting, hematemesis, diarrhea, constipation, melena or bright red blood.  GENITOURINARY: No dysuria, nocturia, hematuria, or urinary incontinence  NEUROLOGICAL: No headaches, memory loss, slurred speech, limb weakness, loss of strength, numbness, or tremors  SKIN: No itching, burning, rashes, or lesions   LYMPH NODES: No enlarged glands  ENDOCRINE: No heat or cold intolerance, or hair loss  MUSCULOSKELETAL: No joint pain or swelling, muscle, back, or extremity pain  PSYCHIATRIC: No depression, anxiety, or difficulty sleeping  HEME/LYMPH: No easy bruising or bleeding gums  ALLERY AND IMMUNOLOGIC: No hives or rash.      Vital Signs Last 24 Hrs  T(C): 36.1 (18 Jan 2023 09:55), Max: 36.6 (17 Jan 2023 14:05)  T(F): 97 (18 Jan 2023 09:55), Max: 97.8 (17 Jan 2023 14:05)  HR: 66 (18 Jan 2023 09:55) (55 - 78)  BP: 144/72 (18 Jan 2023 09:55) (138/66 - 162/63)  BP(mean): --  RR: 18 (18 Jan 2023 09:55) (18 - 20)  SpO2: 99% (17 Jan 2023 15:00) (99% - 99%)    PHYSICAL EXAM:    GENERAL: In no apparent distress, well nourished, and hydrated.  HEART: Regular rate and rhythm; No murmurs, rubs, or gallops.  PULMONARY: Clear to auscultation and perfusion.  No rales, wheezing, or rhonchi bilaterally.  ABDOMEN: Soft, Nontender, Nondistended; Bowel sounds present  EXTREMITIES:  2+ Peripheral Pulses, No clubbing, cyanosis, or edema  NEUROLOGICAL: Grossly nonfocal    INTERPRETATION OF TELEMETRY:  NSR at 82 BPM    ECG:  < from: 12 Lead ECG (01.17.23 @ 13:57) >  Ventricular Rate 57 BPM    Atrial Rate 57 BPM    P-R Interval 148 ms    QRS Duration 90 ms    Q-T Interval 412 ms    QTC Calculation(Bazett) 401 ms    P Axis 77 degrees    R Axis 64 degrees    T Axis 48 degrees    Diagnosis Line Sinus bradycardia  Cannot rule out Anterior infarct , age undetermined  Abnormal ECG    < end of copied text >      I&O's Detail      LABS:                        15.6   5.43  )-----------( 169      ( 18 Jan 2023 06:23 )             46.8     01-18    145  |  107  |  19  ----------------------------<  103<H>  4.5   |  29  |  0.7    Ca    9.9      18 Jan 2023 06:23  Mg     2.1     01-18    TPro  5.8<L>  /  Alb  4.1  /  TBili  0.5  /  DBili  x   /  AST  12  /  ALT  15  /  AlkPhos  63  01-18    CARDIAC MARKERS ( 18 Jan 2023 06:23 )  x     / <0.01 ng/mL / x     / x     / x        RADIOLOGY & ADDITIONAL STUDIES:

## 2023-01-22 DIAGNOSIS — I77.6 ARTERITIS, UNSPECIFIED: ICD-10-CM

## 2023-01-22 DIAGNOSIS — R00.1 BRADYCARDIA, UNSPECIFIED: ICD-10-CM

## 2023-01-22 DIAGNOSIS — Z86.718 PERSONAL HISTORY OF OTHER VENOUS THROMBOSIS AND EMBOLISM: ICD-10-CM

## 2023-01-22 DIAGNOSIS — Z86.711 PERSONAL HISTORY OF PULMONARY EMBOLISM: ICD-10-CM

## 2023-01-22 DIAGNOSIS — Z53.8 PROCEDURE AND TREATMENT NOT CARRIED OUT FOR OTHER REASONS: ICD-10-CM

## 2023-01-22 DIAGNOSIS — Z95.828 PRESENCE OF OTHER VASCULAR IMPLANTS AND GRAFTS: ICD-10-CM

## 2023-01-24 ENCOUNTER — APPOINTMENT (OUTPATIENT)
Dept: RHEUMATOLOGY | Facility: CLINIC | Age: 68
End: 2023-01-24
Payer: MEDICARE

## 2023-01-24 VITALS
SYSTOLIC BLOOD PRESSURE: 124 MMHG | HEIGHT: 67 IN | DIASTOLIC BLOOD PRESSURE: 80 MMHG | BODY MASS INDEX: 20.4 KG/M2 | OXYGEN SATURATION: 98 % | HEART RATE: 66 BPM | WEIGHT: 130 LBS

## 2023-01-24 PROBLEM — C44.90 UNSPECIFIED MALIGNANT NEOPLASM OF SKIN, UNSPECIFIED: Chronic | Status: ACTIVE | Noted: 2023-01-09

## 2023-01-24 PROBLEM — Z86.79 PERSONAL HISTORY OF OTHER DISEASES OF THE CIRCULATORY SYSTEM: Chronic | Status: ACTIVE | Noted: 2023-01-09

## 2023-01-24 PROCEDURE — 99214 OFFICE O/P EST MOD 30 MIN: CPT

## 2023-01-24 RX ORDER — SULFAMETHOXAZOLE AND TRIMETHOPRIM 400; 80 MG/1; MG/1
400-80 TABLET ORAL DAILY
Qty: 60 | Refills: 1 | Status: DISCONTINUED | COMMUNITY
Start: 2022-09-06 | End: 2023-01-24

## 2023-01-24 RX ORDER — LORAZEPAM 0.5 MG/1
0.5 TABLET ORAL
Qty: 30 | Refills: 0 | Status: DISCONTINUED | COMMUNITY
Start: 2022-09-15 | End: 2023-01-24

## 2023-01-24 RX ORDER — PREDNISONE 10 MG/1
10 TABLET ORAL DAILY
Qty: 180 | Refills: 0 | Status: DISCONTINUED | COMMUNITY
Start: 2022-09-21 | End: 2023-01-24

## 2023-01-24 RX ORDER — PREDNISONE 2.5 MG/1
2.5 TABLET ORAL
Qty: 60 | Refills: 0 | Status: DISCONTINUED | COMMUNITY
Start: 2022-11-22 | End: 2023-01-24

## 2023-01-24 RX ORDER — PREDNISONE 20 MG/1
20 TABLET ORAL
Refills: 0 | Status: DISCONTINUED | COMMUNITY
End: 2023-01-24

## 2023-01-24 RX ORDER — LORAZEPAM 1 MG/1
1 TABLET ORAL
Refills: 0 | Status: DISCONTINUED | COMMUNITY
End: 2023-01-24

## 2023-01-24 RX ORDER — NIFEDIPINE 60 MG/1
60 TABLET, FILM COATED, EXTENDED RELEASE ORAL
Qty: 60 | Refills: 0 | Status: DISCONTINUED | COMMUNITY
Start: 2022-09-01 | End: 2023-01-24

## 2023-01-24 RX ORDER — PREDNISONE 5 MG/1
5 TABLET ORAL
Qty: 60 | Refills: 1 | Status: DISCONTINUED | COMMUNITY
Start: 2022-11-22 | End: 2023-01-24

## 2023-01-24 RX ORDER — SULFAMETHOXAZOLE AND TRIMETHOPRIM 400; 80 MG/1; MG/1
400-80 TABLET ORAL DAILY
Qty: 14 | Refills: 0 | Status: DISCONTINUED | COMMUNITY
Start: 2022-10-25 | End: 2023-01-24

## 2023-01-24 RX ORDER — NIFEDIPINE 60 MG
60 TABLET, EXTENDED RELEASE ORAL
Refills: 0 | Status: DISCONTINUED | COMMUNITY
End: 2023-01-24

## 2023-01-24 NOTE — HISTORY OF PRESENT ILLNESS
[FreeTextEntry1] : Since her last visit, pt went to have an IVC filter retrieval but experienced bradycardia down to HR 10 when she received anesthesia, so the procedure was aborted. She is now wearing a monitor for 30 days to monitor her heart rate, and following up with a cardiologist. Pt denies any symptoms from the bradycardia, and has generally been feeling okay otherwise, aside from continued R>L mid-back pain for which she takes prn Tylenol, and L 3rd MCP discomfort with closing her fist at times.\par \par Previous HPI: Pt presented to the hospital 8/22 with acute-onset severe L flank pain, and was found to have hemoperitoneum requiring coil embolization. 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 Pt had labs which demonstrated slightly elevated CRP to 13.9, slightly decreased C3 to 77, and borderline CCP of 20, but no other findings, either in terms of symptoms or labs, to point to any particular underlying etiology for pt's vasculitis. She also had a CTA chest which demonstrated bilateral segmental and subsegmental PEs, and an LE Doppler which demonstrated a R peroneal vein DVT, which were thought to be related to a prothrombotic state in the setting of pt's acute inflammation. Pt received 3 days of pulse-dose Solumedrol 8/24-8/26, and was then switched to 1 mg/kg prednisone and had an IVC filter placed. \par \par Since her discharge from the hospital, pt has been feeling well. She denies pain, swelling anywhere, diarrhea, blood in her stool, rashes, or SOB. She has been walking around and going about her ADLs without any issues. Pt was also started on prn nifedipine during her hospitalization because her blood pressure was noted to be intermittently elevated, but she only had one SBP reading in the 140s at home so far. \par \par Pt does note a long-standing history of "aches and pains" in her head and extremities which come and go. Also, pt's daughter notes that she needed a blood transfusion when she was born and had frequent infections as a child, but does not have any specific diagnoses related to these issues.\par \par Physical exam: GEN: Pleasant, AAO woman sitting on exam table in NAD\par SKIN: No rashes\par ENT: No LAD\par PULM: Clear to auscultation b/l\par CV: Regular rate and rhythm, no murmurs\par MSK:\par Shoulders: Full ROM b/l\par Elbows: Full ROM b/l, no effusions\par Wrists: Full ROM b/l, no effusions\par Hands: no synovitis\par Hips: Full ROM b/l\par Knees: no effusions, full ROM b/l\par Ankles: no effusions, full ROM b/l\par Feet: no effusions, no TTP\par EXT: no LE edema b/l

## 2023-01-24 NOTE — ASSESSMENT
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 Aside from acute-onset abdominal pain in the setting of pt's acute bleed, she has had no other symptoms to suggest any particular type of vasculitis, and her bloodwork demonstrated borderline CCP of 20, decreased C3 to 77, normal ESR and only slightly elevated CRP, overall not pointing to any specific subtype of vasculitis. The differential for pt's findings remains a localized GI vasculitis, versus an initial presentation of a systemic vasculitis such as ANCA vasculitis (which is not always ANCA positive). Pt was also found to have acute b/l PEs on CTA chest on 8/29/2022, which have been reported in the setting of various types of vasculitis including ANCA vasculitis, IgA vasculitis and vasculitis secondary to systemic connective tissue diseases, thought to possibly occur due to a prothrombotic state secondary to inflammation. Pt had repeat CTA chest/abdomen/pelvis in 12/2022 which demonstrated interval resolution of vascular abnormalities and hemoperitoneum, as well as resolution of pulmonary emboli.\par \par - Continue prednisone taper: 2.5 mg x 2 weeks, then discontinue\par \par f/u in 1 month, will f/u labs including ESR and CRP after next visit

## 2023-02-23 ENCOUNTER — APPOINTMENT (OUTPATIENT)
Dept: RHEUMATOLOGY | Facility: CLINIC | Age: 68
End: 2023-02-23
Payer: MEDICARE

## 2023-02-23 ENCOUNTER — LABORATORY RESULT (OUTPATIENT)
Age: 68
End: 2023-02-23

## 2023-02-23 VITALS
SYSTOLIC BLOOD PRESSURE: 120 MMHG | BODY MASS INDEX: 20.4 KG/M2 | WEIGHT: 130 LBS | DIASTOLIC BLOOD PRESSURE: 80 MMHG | HEART RATE: 74 BPM | OXYGEN SATURATION: 91 % | HEIGHT: 67 IN

## 2023-02-23 PROCEDURE — 99214 OFFICE O/P EST MOD 30 MIN: CPT

## 2023-02-23 NOTE — ASSESSMENT
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 Aside from acute-onset abdominal pain in the setting of pt's acute bleed, she has had no other symptoms to suggest any particular type of vasculitis, and her bloodwork demonstrated borderline CCP of 20, decreased C3 to 77, normal ESR and only slightly elevated CRP, overall not pointing to any specific subtype of vasculitis. The differential for pt's findings remains a localized GI vasculitis, versus an initial presentation of a systemic vasculitis such as ANCA vasculitis (which is not always ANCA positive). Pt was also found to have acute b/l PEs on CTA chest on 8/29/2022, which have been reported in the setting of various types of vasculitis including ANCA vasculitis, IgA vasculitis and vasculitis secondary to systemic connective tissue diseases, thought to possibly occur due to a prothrombotic state secondary to inflammation. Pt had repeat CTA chest/abdomen/pelvis in 12/2022 which demonstrated interval resolution of vascular abnormalities and hemoperitoneum, as well as resolution of pulmonary emboli.\par \par - f/u ESR, CRP, CMP, CBC, ANCA, complements, UA, UPCR \par \par f/u in 3 months

## 2023-02-23 NOTE — HISTORY OF PRESENT ILLNESS
[FreeTextEntry1] : Pt has been feeling well since her last visit, with no new symptoms. She denies any episodes of palpitations or any issues with her heart rhythm; she sent back the Holter monitor last week.\par \par Previous HPI: Pt presented to the hospital 8/22 with acute-onset severe L flank pain, and was found to have hemoperitoneum requiring coil embolization. 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 Pt had labs which demonstrated slightly elevated CRP to 13.9, slightly decreased C3 to 77, and borderline CCP of 20, but no other findings, either in terms of symptoms or labs, to point to any particular underlying etiology for pt's vasculitis. She also had a CTA chest which demonstrated bilateral segmental and subsegmental PEs, and an LE Doppler which demonstrated a R peroneal vein DVT, which were thought to be related to a prothrombotic state in the setting of pt's acute inflammation. Pt received 3 days of pulse-dose Solumedrol 8/24-8/26, and was then switched to 1 mg/kg prednisone and had an IVC filter placed. \par \par Since her discharge from the hospital, pt has been feeling well. She denies pain, swelling anywhere, diarrhea, blood in her stool, rashes, or SOB. She has been walking around and going about her ADLs without any issues. Pt was also started on prn nifedipine during her hospitalization because her blood pressure was noted to be intermittently elevated, but she only had one SBP reading in the 140s at home so far. \par \par Pt does note a long-standing history of "aches and pains" in her head and extremities which come and go. Also, pt's daughter notes that she needed a blood transfusion when she was born and had frequent infections as a child, but does not have any specific diagnoses related to these issues.\par \par In January 2023, pt went for an IVC filter retrieval but the procedure had to be aborted due to bradycardia down to HR 10 after receiving anesthesia. She is currently undergoing a cardiac workup for further evaluation of the bradycardia.\par \par Physical exam: GEN: Pleasant, AAO woman sitting on exam table in NAD\par SKIN: No rashes\par ENT: No LAD\par PULM: Clear to auscultation b/l\par CV: Regular rate and rhythm, no murmurs\par MSK:\par Shoulders: Full ROM b/l\par Elbows: Full ROM b/l, no effusions\par Wrists: Full ROM b/l, no effusions\par Hands: no synovitis\par Hips: Full ROM b/l\par Knees: no effusions, full ROM b/l\par Ankles: no effusions, full ROM b/l\par Feet: no effusions, no TTP\par EXT: no LE edema b/l

## 2023-02-27 ENCOUNTER — NON-APPOINTMENT (OUTPATIENT)
Age: 68
End: 2023-02-27

## 2023-02-27 LAB
ALBUMIN SERPL ELPH-MCNC: 4.7 G/DL
ALP BLD-CCNC: 96 U/L
ALT SERPL-CCNC: 25 U/L
ANION GAP SERPL CALC-SCNC: 12 MMOL/L
APPEARANCE: CLEAR
AST SERPL-CCNC: 21 U/L
BASOPHILS # BLD AUTO: 0.04 K/UL
BASOPHILS NFR BLD AUTO: 0.7 %
BILIRUB SERPL-MCNC: 0.5 MG/DL
BILIRUBIN URINE: NEGATIVE
BLOOD URINE: NEGATIVE
BUN SERPL-MCNC: 22 MG/DL
C3 SERPL-MCNC: 91 MG/DL
C4 SERPL-MCNC: 30 MG/DL
CALCIUM SERPL-MCNC: 10.2 MG/DL
CHLORIDE SERPL-SCNC: 102 MMOL/L
CO2 SERPL-SCNC: 28 MMOL/L
COLOR: NORMAL
CREAT SERPL-MCNC: 0.7 MG/DL
CREAT SPEC-SCNC: 25 MG/DL
CREAT/PROT UR: 0.2 RATIO
CRP SERPL-MCNC: <3 MG/L
EGFR: 94 ML/MIN/1.73M2
EOSINOPHIL # BLD AUTO: 0.14 K/UL
EOSINOPHIL NFR BLD AUTO: 2.5 %
ERYTHROCYTE [SEDIMENTATION RATE] IN BLOOD BY WESTERGREN METHOD: 1 MM/HR
GLUCOSE QUALITATIVE U: NEGATIVE
GLUCOSE SERPL-MCNC: 90 MG/DL
HCT VFR BLD CALC: 49.1 %
HGB BLD-MCNC: 16.3 G/DL
IMM GRANULOCYTES NFR BLD AUTO: 0.4 %
KETONES URINE: NEGATIVE
LEUKOCYTE ESTERASE URINE: NEGATIVE
LYMPHOCYTES # BLD AUTO: 1.69 K/UL
LYMPHOCYTES NFR BLD AUTO: 30.6 %
MAN DIFF?: NORMAL
MCHC RBC-ENTMCNC: 28.7 PG
MCHC RBC-ENTMCNC: 33.2 G/DL
MCV RBC AUTO: 86.6 FL
MONOCYTES # BLD AUTO: 0.43 K/UL
MONOCYTES NFR BLD AUTO: 7.8 %
NEUTROPHILS # BLD AUTO: 3.21 K/UL
NEUTROPHILS NFR BLD AUTO: 58 %
NITRITE URINE: NEGATIVE
PH URINE: 6
PLATELET # BLD AUTO: 178 K/UL
POTASSIUM SERPL-SCNC: 4.4 MMOL/L
PROT SERPL-MCNC: 7.1 G/DL
PROT UR-MCNC: 5 MG/DLG/24H
PROTEIN URINE: NEGATIVE
RBC # BLD: 5.67 M/UL
RBC # FLD: 12.6 %
SODIUM SERPL-SCNC: 142 MMOL/L
SPECIFIC GRAVITY URINE: 1.01
UROBILINOGEN URINE: NORMAL
WBC # FLD AUTO: 5.53 K/UL

## 2023-03-28 ENCOUNTER — APPOINTMENT (OUTPATIENT)
Dept: ELECTROPHYSIOLOGY | Facility: CLINIC | Age: 68
End: 2023-03-28
Payer: MEDICARE

## 2023-03-28 VITALS
BODY MASS INDEX: 20.88 KG/M2 | WEIGHT: 133 LBS | DIASTOLIC BLOOD PRESSURE: 80 MMHG | OXYGEN SATURATION: 98 % | HEART RATE: 67 BPM | HEIGHT: 67 IN | SYSTOLIC BLOOD PRESSURE: 124 MMHG

## 2023-03-28 DIAGNOSIS — Z80.49 FAMILY HISTORY OF MALIGNANT NEOPLASM OF OTHER GENITAL ORGANS: ICD-10-CM

## 2023-03-28 DIAGNOSIS — Z82.49 FAMILY HISTORY OF ISCHEMIC HEART DISEASE AND OTHER DISEASES OF THE CIRCULATORY SYSTEM: ICD-10-CM

## 2023-03-28 PROCEDURE — 93000 ELECTROCARDIOGRAM COMPLETE: CPT

## 2023-03-28 PROCEDURE — 99214 OFFICE O/P EST MOD 30 MIN: CPT

## 2023-04-10 ENCOUNTER — OUTPATIENT (OUTPATIENT)
Dept: OUTPATIENT SERVICES | Facility: HOSPITAL | Age: 68
LOS: 1 days | End: 2023-04-10
Payer: MEDICARE

## 2023-04-10 VITALS
OXYGEN SATURATION: 100 % | DIASTOLIC BLOOD PRESSURE: 88 MMHG | HEART RATE: 77 BPM | RESPIRATION RATE: 16 BRPM | TEMPERATURE: 97 F | HEIGHT: 67 IN | SYSTOLIC BLOOD PRESSURE: 138 MMHG | WEIGHT: 136.91 LBS

## 2023-04-10 DIAGNOSIS — Z98.890 OTHER SPECIFIED POSTPROCEDURAL STATES: Chronic | ICD-10-CM

## 2023-04-10 DIAGNOSIS — Z95.828 PRESENCE OF OTHER VASCULAR IMPLANTS AND GRAFTS: Chronic | ICD-10-CM

## 2023-04-10 DIAGNOSIS — I26.99 OTHER PULMONARY EMBOLISM WITHOUT ACUTE COR PULMONALE: ICD-10-CM

## 2023-04-10 DIAGNOSIS — Z01.818 ENCOUNTER FOR OTHER PREPROCEDURAL EXAMINATION: ICD-10-CM

## 2023-04-10 DIAGNOSIS — K55.059 ACUTE (REVERSIBLE) ISCHEMIA OF INTESTINE, PART AND EXTENT UNSPECIFIED: Chronic | ICD-10-CM

## 2023-04-10 LAB
ALBUMIN SERPL ELPH-MCNC: 4.4 G/DL — SIGNIFICANT CHANGE UP (ref 3.5–5.2)
ALP SERPL-CCNC: 93 U/L — SIGNIFICANT CHANGE UP (ref 30–115)
ALT FLD-CCNC: 23 U/L — SIGNIFICANT CHANGE UP (ref 0–41)
ANION GAP SERPL CALC-SCNC: 8 MMOL/L — SIGNIFICANT CHANGE UP (ref 7–14)
APTT BLD: 34 SEC — SIGNIFICANT CHANGE UP (ref 27–39.2)
AST SERPL-CCNC: 18 U/L — SIGNIFICANT CHANGE UP (ref 0–41)
BASOPHILS # BLD AUTO: 0.04 K/UL — SIGNIFICANT CHANGE UP (ref 0–0.2)
BASOPHILS NFR BLD AUTO: 0.9 % — SIGNIFICANT CHANGE UP (ref 0–1)
BILIRUB SERPL-MCNC: 0.4 MG/DL — SIGNIFICANT CHANGE UP (ref 0.2–1.2)
BUN SERPL-MCNC: 22 MG/DL — HIGH (ref 10–20)
CALCIUM SERPL-MCNC: 9.5 MG/DL — SIGNIFICANT CHANGE UP (ref 8.4–10.5)
CHLORIDE SERPL-SCNC: 104 MMOL/L — SIGNIFICANT CHANGE UP (ref 98–110)
CO2 SERPL-SCNC: 29 MMOL/L — SIGNIFICANT CHANGE UP (ref 17–32)
CREAT SERPL-MCNC: 0.7 MG/DL — SIGNIFICANT CHANGE UP (ref 0.7–1.5)
EGFR: 94 ML/MIN/1.73M2 — SIGNIFICANT CHANGE UP
EOSINOPHIL # BLD AUTO: 0.2 K/UL — SIGNIFICANT CHANGE UP (ref 0–0.7)
EOSINOPHIL NFR BLD AUTO: 4.3 % — SIGNIFICANT CHANGE UP (ref 0–8)
GLUCOSE SERPL-MCNC: 85 MG/DL — SIGNIFICANT CHANGE UP (ref 70–99)
HCT VFR BLD CALC: 47.1 % — HIGH (ref 37–47)
HGB BLD-MCNC: 15.4 G/DL — SIGNIFICANT CHANGE UP (ref 12–16)
IMM GRANULOCYTES NFR BLD AUTO: 0.2 % — SIGNIFICANT CHANGE UP (ref 0.1–0.3)
INR BLD: 0.92 RATIO — SIGNIFICANT CHANGE UP (ref 0.65–1.3)
LYMPHOCYTES # BLD AUTO: 1.33 K/UL — SIGNIFICANT CHANGE UP (ref 1.2–3.4)
LYMPHOCYTES # BLD AUTO: 28.5 % — SIGNIFICANT CHANGE UP (ref 20.5–51.1)
MCHC RBC-ENTMCNC: 28.3 PG — SIGNIFICANT CHANGE UP (ref 27–31)
MCHC RBC-ENTMCNC: 32.7 G/DL — SIGNIFICANT CHANGE UP (ref 32–37)
MCV RBC AUTO: 86.4 FL — SIGNIFICANT CHANGE UP (ref 81–99)
MONOCYTES # BLD AUTO: 0.37 K/UL — SIGNIFICANT CHANGE UP (ref 0.1–0.6)
MONOCYTES NFR BLD AUTO: 7.9 % — SIGNIFICANT CHANGE UP (ref 1.7–9.3)
NEUTROPHILS # BLD AUTO: 2.71 K/UL — SIGNIFICANT CHANGE UP (ref 1.4–6.5)
NEUTROPHILS NFR BLD AUTO: 58.2 % — SIGNIFICANT CHANGE UP (ref 42.2–75.2)
NRBC # BLD: 0 /100 WBCS — SIGNIFICANT CHANGE UP (ref 0–0)
PLATELET # BLD AUTO: 183 K/UL — SIGNIFICANT CHANGE UP (ref 130–400)
POTASSIUM SERPL-MCNC: 4.4 MMOL/L — SIGNIFICANT CHANGE UP (ref 3.5–5)
POTASSIUM SERPL-SCNC: 4.4 MMOL/L — SIGNIFICANT CHANGE UP (ref 3.5–5)
PROT SERPL-MCNC: 6.6 G/DL — SIGNIFICANT CHANGE UP (ref 6–8)
PROTHROM AB SERPL-ACNC: 10.5 SEC — SIGNIFICANT CHANGE UP (ref 9.95–12.87)
RBC # BLD: 5.45 M/UL — HIGH (ref 4.2–5.4)
RBC # FLD: 12.7 % — SIGNIFICANT CHANGE UP (ref 11.5–14.5)
SODIUM SERPL-SCNC: 141 MMOL/L — SIGNIFICANT CHANGE UP (ref 135–146)
WBC # BLD: 4.66 K/UL — LOW (ref 4.8–10.8)
WBC # FLD AUTO: 4.66 K/UL — LOW (ref 4.8–10.8)

## 2023-04-10 PROCEDURE — 93010 ELECTROCARDIOGRAM REPORT: CPT

## 2023-04-10 PROCEDURE — 93005 ELECTROCARDIOGRAM TRACING: CPT

## 2023-04-10 PROCEDURE — 85730 THROMBOPLASTIN TIME PARTIAL: CPT

## 2023-04-10 PROCEDURE — 85610 PROTHROMBIN TIME: CPT

## 2023-04-10 PROCEDURE — 85025 COMPLETE CBC W/AUTO DIFF WBC: CPT

## 2023-04-10 PROCEDURE — 36415 COLL VENOUS BLD VENIPUNCTURE: CPT

## 2023-04-10 PROCEDURE — 80053 COMPREHEN METABOLIC PANEL: CPT

## 2023-04-10 PROCEDURE — 99214 OFFICE O/P EST MOD 30 MIN: CPT | Mod: 25

## 2023-04-10 NOTE — H&P PST ADULT - HISTORY OF PRESENT ILLNESS
Pt with hx of multiple pseudoaneurysms and (per chart) had extensive acute bilateral subsegmental and submental PE in the lungs s/p IVC filter placement by IR on 8/31. Once stabilized pt was discharged with IVC filter. In Jan 2023 pt presented to have filter removed but had severe bradycardia and procedure was aborted. Pt now presenting to have procedure attempted again. States "I was told I don't need it anymore."    Denies any chest pain, difficulty breathing, SOB, palpitations, dysuria, URI, or any other infections in the last 2 weeks. Denies any recent travel, contact, or exposure to any persons with known or suspected COVID-19. Pt also denies COVID testing within the last 2 weeks. Pt admits to receiving all doses of COVID vaccine. Denies any suicidal or homicidal ideations. Pt advised to self quarantine until day of procedure. Exercise tolerance of 2-3 flights of stairs without dyspnea. CARRIE reviewed with patient. Pt verbalized understanding of all pre-operative instructions.    Anesthesia Alert  NO--Difficult Airway  NO--History of neck surgery or radiation  NO--Limited ROM of neck  NO--History of Malignant hyperthermia  NO--No personal or family history of Pseudocholinesterase deficiency.  NO--Prior Anesthesia Complication  NO--Latex Allergy  NO--Loose teeth  NO--History of Rheumatoid Arthritis  NO--CARRIE  NO--Bleeding Risk  NO--Other_____

## 2023-04-10 NOTE — H&P PST ADULT - NSICDXPASTMEDICALHX_GEN_ALL_CORE_FT
PAST MEDICAL HISTORY:  H/O vasculitis     History of pseudoaneurysm     HTN (hypertension)     Skin cancer

## 2023-04-10 NOTE — H&P PST ADULT - NSICDXPASTSURGICALHX_GEN_ALL_CORE_FT
PAST SURGICAL HISTORY:  [place] filter in place     Mesenteric arterial embolization     S/P excision of lipoma     S/P skin cancer resection

## 2023-04-10 NOTE — H&P PST ADULT - REASON FOR ADMISSION
68 yo female presents for PAST in preparation for IVC filter removal on 4/14/2023 under LSB anesthesia by Dr. Corbin (IR).

## 2023-04-11 DIAGNOSIS — I26.99 OTHER PULMONARY EMBOLISM WITHOUT ACUTE COR PULMONALE: ICD-10-CM

## 2023-04-11 DIAGNOSIS — Z01.818 ENCOUNTER FOR OTHER PREPROCEDURAL EXAMINATION: ICD-10-CM

## 2023-04-14 ENCOUNTER — OUTPATIENT (OUTPATIENT)
Dept: OUTPATIENT SERVICES | Facility: HOSPITAL | Age: 68
LOS: 1 days | Discharge: HOME | End: 2023-04-14
Payer: MEDICARE

## 2023-04-14 ENCOUNTER — TRANSCRIPTION ENCOUNTER (OUTPATIENT)
Age: 68
End: 2023-04-14

## 2023-04-14 VITALS
SYSTOLIC BLOOD PRESSURE: 169 MMHG | HEART RATE: 70 BPM | DIASTOLIC BLOOD PRESSURE: 70 MMHG | RESPIRATION RATE: 18 BRPM | OXYGEN SATURATION: 99 %

## 2023-04-14 VITALS
HEART RATE: 82 BPM | DIASTOLIC BLOOD PRESSURE: 80 MMHG | HEIGHT: 67 IN | SYSTOLIC BLOOD PRESSURE: 186 MMHG | OXYGEN SATURATION: 98 % | WEIGHT: 152.12 LBS | RESPIRATION RATE: 18 BRPM | TEMPERATURE: 98 F

## 2023-04-14 DIAGNOSIS — Z95.828 PRESENCE OF OTHER VASCULAR IMPLANTS AND GRAFTS: Chronic | ICD-10-CM

## 2023-04-14 DIAGNOSIS — I26.99 OTHER PULMONARY EMBOLISM WITHOUT ACUTE COR PULMONALE: ICD-10-CM

## 2023-04-14 DIAGNOSIS — Z98.890 OTHER SPECIFIED POSTPROCEDURAL STATES: Chronic | ICD-10-CM

## 2023-04-14 DIAGNOSIS — K55.059 ACUTE (REVERSIBLE) ISCHEMIA OF INTESTINE, PART AND EXTENT UNSPECIFIED: Chronic | ICD-10-CM

## 2023-04-14 PROCEDURE — 37193 REM ENDOVAS VENA CAVA FILTER: CPT

## 2023-04-14 PROCEDURE — C1887: CPT

## 2023-04-14 PROCEDURE — C1773: CPT

## 2023-04-14 PROCEDURE — C1769: CPT

## 2023-04-14 PROCEDURE — C1894: CPT

## 2023-04-14 RX ORDER — ACETAMINOPHEN 500 MG
650 TABLET ORAL ONCE
Refills: 0 | Status: COMPLETED | OUTPATIENT
Start: 2023-04-14 | End: 2023-04-14

## 2023-04-14 RX ADMIN — Medication 650 MILLIGRAM(S): at 17:30

## 2023-04-14 RX ADMIN — Medication 650 MILLIGRAM(S): at 17:04

## 2023-04-14 NOTE — PROGRESS NOTE ADULT - SUBJECTIVE AND OBJECTIVE BOX
Interventional Radiology Outpatient Documentation    PREOPERATIVE DAY OF PROCEDURE EVALUATION:     I have personally seen and examined this patient. I agree with the history and physical which I have reviewed and noted any changes below:     Plan is for image guided removal of IVC filter (Signed electronically Jignesh Corbin MD) 04-14-23 @ 1200    Procedure/ risks/ benefits/ goals/ alternatives were explained. All questions answered. Informed content obtained from patient. Consent placed in chart.     POSTOPERATIVE PROCEDURAL EVALUATION:     Procedure: removal of IVC filter    Pre-Op Diagnosis: patient with history of DVT and contraindication to AC, DVT resolved, IVC filter no longer required    Post-Op Diagnosis: same    Attending: Shaquille  Resident: Rosi    Anesthesia (type):  [ ] General Anesthesia  [ x] Sedation provided by anesthesia  [ ] Spinal Anesthesia  [ x] Local/Regional    Contrast: 60 cc    Estimated Blood Loss: Minimal, < 5 cc    Condition:   [ ] Critical  [ ] Serious  [ ] Fair   [x ] Good    Findings/Follow up Plan of Care: Right IJ access obtained. Image guided removal of IVC filter.    See full report in pacs    Specimens Removed: None    Implants: None    Complications: None    Disposition: Recovery, then home

## 2023-04-14 NOTE — CHART NOTE - NSCHARTNOTEFT_GEN_A_CORE
PACU ANESTHESIA ADMISSION NOTE  ____  Intubated  TV:______       Rate: ______      FiO2: ______  __x__  Patent Airway  __x__  Full return of protective reflexes  _x___  Full recovery from anesthesia / back to baseline   Mental Status:    _x___ Awake    ____ Alert     ____ Drowsy    ____ Sedated  Nausea/Vomiting:   _x___ NO    ____ Yes,   See Post - Op Orders        Pain Scale (0-10):    ____ Treatment:   _x___ None      ____ See Post - Op/PCA Orders  Post - Operative Fluids:    __x__ Oral     ____ See Post - Op Orders  Plan: Discharge:     _x___Home         _____Floor       _____Critical Care      _____  Other:_________________    Comments:

## 2023-04-14 NOTE — ASU PATIENT PROFILE, ADULT - FALL HARM RISK - UNIVERSAL INTERVENTIONS
Bed in lowest position, wheels locked, appropriate side rails in place/Call bell, personal items and telephone in reach/Instruct patient to call for assistance before getting out of bed or chair/Non-slip footwear when patient is out of bed/[place] to call system/Physically safe environment - no spills, clutter or unnecessary equipment/Purposeful Proactive Rounding/Room/bathroom lighting operational, light cord in reach

## 2023-04-14 NOTE — PRE-OP CHECKLIST - HEIGHT IN CM
Patient : Nigel Torres Age: 78 year old Sex: male   MRN: [id number] Encounter Date: 3/23/2021      History     Chief Complaint   Patient presents with   • Diarrhea Adult     HPI  E11/11  3/23/2021  1:51 PM Nigel Torres is a 78 year old male who presents to the ED for evaluation of diarrhea for two weeks. His diarrhea began as liquid stools, and has progressed to water with few stool particles. Patient has been evaluated by Urgent Care and the ED for these symptoms. Pt was sent home with anxiety pills at that time. Pt also states that he received the covid vaccination on 2/10, and has experienced \"hot flashes\" following his vaccination. Is hot flashes begin to the RLQ and spread throughout his chest and into his face. Pt reports runny nose. Pt denies urinary sx, chest pain, N/V, fever, chills, SOB. He denies medication changes. Pt denies DM or taking Metformin. He denies evaluation from a GI or having a recent colonoscopy. Hx MI and cardiac stents. He was recently on an antibiotic for UTI. There are no further complaints or modifying factors at this time.    PCP: Imelda Bowers MD     Chart Review: I reviewed the pt's medications, allergies, and past medical and surgical history in [facility]. I reviewed pt's ED visit from 3/19/221 at which time he was seen for diarrhea. I also reviewed his gastrointestinal pap and C-diff from 3/19, both of which were negative.    No Known Allergies    Discharge Medication List as of 3/23/2021  5:41 PM      Prior to Admission Medications    Details   cephalexin (Keflex) 500 MG capsule Take 1 capsule by mouth 2 times daily for 5 days.Eprescribe, Disp-10 capsule,R-0      ondansetron (Zofran ODT) 4 MG disintegrating tablet Place 1 tablet onto the tongue every 6 hours.Eprescribe, Disp-10 tablet,R-0      ALPRAZolam (XANAX) 0.25 MG tablet Delegates - In compliance with state law, the Prescription Drug Monitoring Program must be reviewed prior to signing any order for a controlled  substance. PDMP Reviewed by Delegate - No Unexpected ActivityTake 1 tablet by mouth 3 times daily as needed f or Anxiety.Eprescribe, Disp-5 tablet,R-0      tamsulosin (Flomax) 0.4 MG Cap Take 1 capsule by mouth twice daily after meals.Eprescribe, Disp-180 capsule,R-3      OMEPRAZOLE PO Historical Med      Cholecalciferol (VITAMIN D PO) Historical Med      Multiple Minerals-Vitamins (CITRACAL PLUS PO) Historical Med      lisinopril-hydrochlorothiazide (PRINZIDE,ZESTORETIC) 20-12.5 MG per tablet Take 1 tablet by mouth daily. Indications: High Blood Pressure Historical Med      metoPROLOL (LOPRESSOR) 100 MG tablet Take 100 mg by mouth 2 times daily. Indications: High Blood Pressure Historical Med      clopidogrel (PLAVIX) 75 MG tablet Take 75 mg by mouth daily. Indications: Treatment to Prevent a Blood Clot in a Vascular Stent Historical Med      isosorbide mononitrate (IMDUR) 60 MG 24 hr tablet Take 60 mg by mouth daily. Indications: Chronic Angina Pectoris Historical Med      atorvastatin (LIPITOR) 80 MG tablet Take 80 mg by mouth nightly. Indications: Cholesterol Management Historical Med         New Prescriptions    Details   diphenoxylate-atropine (LOMOTIL) 2.5-0.025 MG tablet Take 1 tablet by mouth 4 times daily as needed for Diarrhea.Eprescribe, Disp-40 tablet,R-0      pantoprazole (PROTONIX) 40 MG tablet Take 1 tablet by mouth daily.Eprescribe, Disp-30 tablet,R-3             Past Medical History:   Diagnosis Date   • CAD (coronary artery disease)    • Cataract     Bilateral   • Diverticulosis    • Essential (primary) hypertension    • GERD (gastroesophageal reflux disease)    • [place] (hard of hearing)     Wears bilateral hearing aids   • Hypercholesterolemia    • NSTEMI (non-ST elevated myocardial infarction) (CMS/HCC) 1/16/2016    Medical management   • Obstruction of left ureteropelvic junction (UPJ) due to stone 1/16/2016   • Skin cancer, basal cell     left temple, removed   • Urinary tract infection  1/15/2016 & 2/2016    E. Coli UTI pre-op   • Wears glasses        Past Surgical History:   Procedure Laterality Date   • CARDIAC CATHERIZATION  1/16/2016    LVEF - 55% RCA stents patent mid to dist PDA 90% lesion, LM 30%, LAD 20% with patent stents; Medical management of NSTEMI   • COLONOSCOPY      diverticulosis   • COLONOSCOPY DIAGNOSTIC  11/12/2013    diverticulosis;repeat 10 yrs;11/2023   • CORONARY ANGIOPLASTY WITH STENT PLACEMENT  in his  early 60's,    [facility] RCA and LAD [facility]   • EYE SURGERY Bilateral     detached retina repairs   • EYE SURGERY Bilateral ~2006    Cataract extractions with IOL implants   • HERNIA REPAIR  age 3   • INGUINAL HERNIA REPAIR Left 11/13/13    Left inguinal hernia repair   • SERVICE TO UROLOGY Left 1/16/2016    Cystoscopy with Left ureteral stent placement       Family History   Problem Relation Age of Onset   • Hypertension Father    • Heart disease Father         MI   • Hypertension Brother    • Heart disease Brother    • Heart disease Mother    • Hypertension Mother    • Cancer Brother         lung- smoker, brain   • Diabetes Neg Hx        Social History     Tobacco Use   • Smoking status: Never Smoker   • Smokeless tobacco: Never Used   Substance Use Topics   • Alcohol use: Yes     Frequency: 4 or more times a week     Drinks per session: 1 or 2     Binge frequency: Never   • Drug use: No       E-cigarette/Vaping     E-Cigarette/Vaping Substances & Devices       Review of Systems   Constitutional: Negative for chills and fever.        Positive for \"hot flashes.\"   HENT: Positive for rhinorrhea. Negative for congestion, ear pain and sore throat.    Eyes: Negative for discharge and redness.   Respiratory: Negative for cough, chest tightness and shortness of breath.    Cardiovascular: Negative for chest pain and leg swelling.   Gastrointestinal: Positive for diarrhea. Negative for abdominal pain, constipation, nausea and vomiting.   Genitourinary: Negative for  difficulty urinating, dysuria and frequency.   Skin: Negative for rash.   Neurological: Negative for dizziness and headaches.   Psychiatric/Behavioral: Negative.        Physical Exam     ED Triage Vitals [03/23/21 1346]   ED Triage Vitals Group      Temp 97.5 °F (36.4 °C)      Heart Rate 62      Resp 16      BP (!) 159/98      SpO2 97 %      EtCO2 mmHg       Height 6' (1.829 m)      Weight 205 lb (93 kg)      Weight Scale Used       BMI (Calculated) 27.8      IBW/kg (Calculated) 77.6       Physical Exam   Constitutional: He is oriented to person, place, and time. He appears well-developed and well-nourished.   HENT:   Head: Normocephalic.   Eyes: Conjunctivae and EOM are normal. Right eye exhibits no discharge. Left eye exhibits no discharge. No scleral icterus.   Neck: Normal range of motion. Neck supple.   Cardiovascular: Normal rate, regular rhythm, normal heart sounds and intact distal pulses.   Pulmonary/Chest: Effort normal and breath sounds normal. No respiratory distress. He has no wheezes.   Abdominal: Soft. Bowel sounds are normal. He exhibits no distension. There is no abdominal tenderness.   Musculoskeletal: Normal range of motion.   Neurological: He is alert and oriented to person, place, and time. GCS eye subscore is 4. GCS verbal subscore is 5. GCS motor subscore is 6.   Skin: Skin is warm and dry. No rash noted.   Psychiatric: He has a normal mood and affect.   Nursing note and vitals reviewed.    ED Course     Procedures    Lab Results     Results for orders placed or performed during the hospital encounter of 03/23/21   Prothrombin Time   Result Value Ref Range    Prothrombin Time 11.6 9.7 - 11.8 sec    INR 1.1 <=5.0   Partial Thromboplastin Time   Result Value Ref Range    PTT 24 22 - 30 sec   Comprehensive Metabolic Panel   Result Value Ref Range    Fasting Status      Sodium 135 135 - 145 mmol/L    Potassium 3.6 3.4 - 5.1 mmol/L    Chloride 102 98 - 107 mmol/L    Carbon Dioxide 31 21 - 32 mmol/L     Anion Gap 6 (L) 10 - 20 mmol/L    Glucose 130 (H) 65 - 99 mg/dL    BUN 15 6 - 20 mg/dL    Creatinine 1.29 (H) 0.67 - 1.17 mg/dL    Glomerular Filtration Rate 52 (L) >90 mL/min/1.73m2    BUN/ Creatinine Ratio 12 7 - 25    Calcium 9.0 8.4 - 10.2 mg/dL    Bilirubin, Total 0.4 0.2 - 1.0 mg/dL    GOT/AST 22 <=37 Units/L    GPT/ALT 21 <64 Units/L    Alkaline Phosphatase 77 45 - 117 Units/L    Albumin 3.4 (L) 3.6 - 5.1 g/dL    Protein, Total 6.8 6.4 - 8.2 g/dL    Globulin 3.4 2.0 - 4.0 g/dL    A/G Ratio 1.0 1.0 - 2.4   Magnesium   Result Value Ref Range    Magnesium 2.2 1.7 - 2.4 mg/dL   Urinalysis & Reflex Microscopy With Culture If Indicated   Result Value Ref Range    COLOR, URINALYSIS Straw     APPEARANCE, URINALYSIS Clear     GLUCOSE, URINALYSIS Negative Negative mg/dL    BILIRUBIN, URINALYSIS Negative Negative    KETONES, URINALYSIS Negative Negative mg/dL    SPECIFIC GRAVITY, URINALYSIS 1.015 1.005 - 1.030    OCCULT BLOOD, URINALYSIS Negative Negative    PH, URINALYSIS >8.5 (H) 5.0 - 7.0    PROTEIN, URINALYSIS Negative Negative mg/dL    UROBILINOGEN, URINALYSIS 0.2 0.2, 1.0 mg/dL    NITRITE, URINALYSIS Negative Negative    LEUKOCYTE ESTERASE, URINALYSIS Negative Negative   COVID/Flu/RSV panel   Result Value Ref Range    Rapid SARS-COV-2 by PCR Not Detected Not Detected / Detected / Presumptive Positive / Inhibitors present    Influenza A by PCR Not Detected Not Detected    Influenza B by PCR Not Detected Not Detected    RSV BY PCR Not Detected Not Detected    Isolation Guidelines      Procedural Comment     CBC with Automated Differential (performable only)   Result Value Ref Range    WBC 6.6 4.2 - 11.0 K/mcL    RBC 4.63 4.50 - 5.90 mil/mcL    HGB 15.3 13.0 - 17.0 g/dL    HCT 43.0 39.0 - 51.0 %    MCV 92.9 78.0 - 100.0 fl    MCH 33.0 26.0 - 34.0 pg    MCHC 35.6 32.0 - 36.5 g/dL    RDW-CV 12.2 11.0 - 15.0 %    RDW-SD 41.1 39.0 - 50.0 fL     140 - 450 K/mcL    NRBC 0 <=0 /100 WBC    Neutrophil, Percent 58 %     Lymphocytes, Percent 26 %    Mono, Percent 10 %    Eosinophils, Percent 5 %    Basophils, Percent 1 %    Immature Granulocytes 0 %    Absolute Neutrophils 3.9 1.8 - 7.7 K/mcL    Absolute Lymphocytes 1.7 1.0 - 4.0 K/mcL    Absolute Monocytes 0.6 0.3 - 0.9 K/mcL    Absolute Eosinophils  0.3 0.0 - 0.5 K/mcL    Absolute Basophils 0.0 0.0 - 0.3 K/mcL    Absolute Immmature Granulocytes 0.0 0.0 - 0.2 K/mcL   ISTAT8 VENOUS  POINT OF CARE   Result Value Ref Range    BUN - POINT OF CARE 17 6 - 20 mg/dL    SODIUM - POINT OF CARE 139 135 - 145 mmol/L    POTASSIUM - POINT OF CARE 3.8 3.4 - 5.1 mmol/L    CHLORIDE - POINT OF CARE 97 (L) 98 - 107 mmol/L    TCO2 - POINT OF CARE 30 (H) 19 - 24 mmol/L    ANION GAP - POINT OF CARE 16 10 - 20 mmol/L    HEMATOCRIT - POINT OF CARE 48.0 39.0 - 51.0 %    HEMOGLOBIN - POINT OF CARE 16.3 13.0 - 17.0 g/dL    GLUCOSE - POINT OF CARE 130 (H) 70 - 99 mg/dL    CALCIUM, IONIZED - POINT OF CARE 1.20 1.15 - 1.29 mmol/L    Creatinine 1.20 (H) 0.67 - 1.17 mg/dL    Glomerular Filtration Rate 57 (L) >90 mL/min/1.73m2   LACTIC ACID VENOUS POINT OF CARE   Result Value Ref Range    LACTIC ACID, VENOUS - POINT OF CARE 1.3 <2.0 mmol/L   TROPONIN I  POINT OF CARE   Result Value Ref Range    TROPONIN I - POINT OF CARE <0.10 <0.10 ng/mL       EKG Results     EKG Interpretation  Rate: 56  Rhythm: sinus bradycardia   Abnormality: T wave inversion in lead 3  No ischemic changes  No change when compared with EKG from 3/19/2021  EKG tracing interpreted by ED physician    Radiology Results     Imaging Results          CT ABDOMEN PELVIS W CONTRAST (Final result)  Result time 03/23/21 16:16:20    Final result                 Impression:    IMPRESSION:     1.  Fluid is demonstrated within the colon compatible with the history of  diarrhea.  Colonic walls do not appear abnormally thickened.  No gross  inflammatory change.    2.  Intermediate density structure at the periphery of the dominant cyst at  the upper pole of  the left kidney.  Further evaluation with renal  ultrasound is recommended to further characterize this.    3.  Additional ancillary findings as above.             Narrative:    CT ABDOMEN PELVIS W CONTRAST     CLINICAL INDICATION:  Diarrhea, pain.     TECHNIQUE: Helical scans were obtained following IV administration of  100  cc's   Isovue-300 IV. Sagittal and coronal reformats.    COMPARISON: CT of the abdomen and pelvis dated 2/7/2017, abdominal  radiographs dated 3/18/2021    FINDINGS: Scans through lung bases demonstrate no significant pleural or  parenchymal disease.  Coronary arterial calcification and/or stenting.  No  pleural or pericardial effusion    Scans through the liver demonstrates a tiny hypodensity within the right  hepatic lobe (2:47) too small to characterize.  Scans through the liver  otherwise unremarkable.  The gallbladder appears unremarkable.  No  significant biliary ductal dilation.    The spleen is normal in size and attenuation.    The pancreas and adrenal glands appear within normal limits.    The abdominal aorta is normal in caliber.  Scattered atherosclerotic  calcification of the aorta and its branches.    Scans through the kidneys demonstrate 2 dominant simple appearing cyst  cysts.  The largest arising from the upper pole measuring approximately 7.7  x 5.7 cm, slightly increased in size from 2017.  There is an intermediate  density area at the superior periphery of the cyst, adjacent to the spleen.   This appears to be separate from the spleen on the sagittal reformatted  images.  The structure in question measures approximately 1.4 cm (2:41.  It  is also visible on sagittal image #52, series 6.  Additional small  exophytic cyst is present as well as a tiny hypodensity at the lower pole  of the left kidney which is too small to characterize.  There is a punctate  nonobstructive calculus present at the lower pole of the left kidney,  reported previously.  The right kidney appears  unremarkable.  No  hydronephrosis or significant perinephric stranding.    The mid and distal stomach are somewhat distended with fluid.  There is  fluid demonstrated within the descending colon sigmoid and rectum  consistent with history of diarrhea.  Stool and fluid are demonstrated more  proximally within the colon.  There is radiodense material present within  the appendix, not present on prior exam potentially ingested material.  The  appendix is normal in size.  No significant inflammatory changes in this  area.  No pneumatosis or free intraperitoneal air.    Small fat-containing umbilical hernia is again demonstrated.  Previously  reported 3 cm left inguinal hernia is again demonstrated.  This contains  fat.  A peripheral portion sigmoid colon, essentially a diverticulum,  protrudes just at the proximal portion of the hernia, however similar  appearance was present previously.  Visible on axial image number 136.  No  obstruction or acute inflammatory changes.  Colonic diverticulosis.    Urinary bladder is mildly distended but appears otherwise unremarkable.   Coarse prostatic calcification.  No ascites.    No pathologically enlarged lymph nodes.    No acute osseous findings.  No suspicious-appearing osseous lesions.   Multilevel degenerative changes are demonstrated within the spine.                                ED Medication Orders (From admission, onward)    Ordered Start     Status Ordering Provider    03/23/21 1416 03/23/21 1417  alum-mag hydroxide+simethicone/lidocaine viscous (2:1) (MAGIC MOUTHWASH/GI COCKTAIL) (compounded) oral suspension 15 mL  ONCE      Last MAR action: Given SONIA KOO    03/23/21 1416 03/23/21 1417  sodium chloride (NORMAL SALINE) 0.9 % bolus 1,000 mL  ONCE      Last MAR action: Completed SONIA KOO               [facility]  Vitals  Vitals:    03/23/21 1621 03/23/21 1631 03/23/21 1700 03/23/21 1730   BP: (!) 186/94 (!) 143/87 126/79 (!) 172/97   Patient Position:        Pulse: (!) 55 (!) 53 (!) 53 (!) 53   Resp: 18 13 13 12   Temp:       TempSrc:       SpO2: 96% 96% 95% 95%   Weight:       Height:           ED Course  5:40 PM: I rechecked the pt who is resting comfortably and feeling improved. I discussed his imaging and laboratory workup which are negative for acute abnormalities.  He is concerned about his hot flashes.  He is asking for pain medications for this.  We discussed that I am unable to provide opiate pain medications for his hot flashes, however, he should follow up with his primary care doctor for this.  We did discuss the plan for outpatient follow-up with GI.  I will start a PPI, in case these hot flashes are actually reflux, and order and anti diarrheal.  We discussed the plan of care, he verbalized understanding and agrees.      [facility]  Critical Care time spent on this patient outside of billable procedures:  None    Clinical Impression  ED Diagnoses        Final diagnoses    Diarrhea, unspecified type     Associated Orders          SERVICE TO GASTROENTEROLOGY     Hot flash in male                Follow Up:  [facility]  [address] [facility] [address] [phone]          Darrion Queen MD  [address]  [address]  [facility] [address]  [phone]                Summary of your Discharge Medications      Take these Medications      Details   diphenoxylate-atropine 2.5-0.025 MG tablet  Commonly known as: LOMOTIL   Take 1 tablet by mouth 4 times daily as needed for Diarrhea.     pantoprazole 40 MG tablet  Commonly known as: PROTONIX   Take 1 tablet by mouth daily.            Pt is discharged to home/self care in stable condition.       I have reviewed the information recorded by the scribe for accuracy and agree with its contents.    ____________________________________________________________________    Diann Cadena acting as a scribe for Dr. Emani Velazquez  Dictation # [id number]  Scribe: Diann  Tammi Velazquez MD  03/23/21 2725     170.18

## 2023-04-14 NOTE — PRE-ANESTHESIA EVALUATION ADULT - NSANTHPMHFT_GEN_ALL_CORE
previous attempt at removal aborted due to significant christopher cardia  cardiology consulted prior  - ( possible Vagal?  normal TTE

## 2023-04-17 NOTE — ADDENDUM
[FreeTextEntry1] : IChanell assisted in documentation on 03/28/2023 acting as a scribe for Nikki Shepard.\par

## 2023-04-17 NOTE — DISCUSSION/SUMMARY
[EKG obtained to assist in diagnosis and management of assessed problem(s)] : EKG obtained to assist in diagnosis and management of assessed problem(s) [FreeTextEntry1] : Ms. Heidi Shore is a pleasant 67-year-old woman with Vasculitis History of Pulmonary Embolism S/P IVC filter placement in August of 2022, who had a severe vagal reaction during IVC filter removal leading to aborting the procedure. Patient was seen in January of 2023 at the time of the vagal reaction and was given MCOT for monitoring. MCOT showed no significant arrhythmias. Patient had normal 2D Echo.\par \par Patient had most likely transient vagal reaction due to manipulation of the IJ during IVC filter removal. There is no contraindication at reattempting to remove the IVC filter. In case patient had recurrence of vagal reaction during removal of IVC filter. I recommend placing a temporary transvenous pacemaker to allow the procedure to be completed without bradycardia. I discussed above findings and the result of the MCOT with patient at length. I also discussed care with Dr. Corbin from IR.\par \par I discussed with patient plan of care in great details. I answered all her questions to her satisfaction. Patient was pleased with the visit.\par \par Patient will follow with her PCP. Please do not hesitate to contact me at [phone] if you have any further questions regarding this patient care.

## 2023-04-17 NOTE — CARDIOLOGY SUMMARY
[de-identified] : (3/28/2023): Sinus rhythm at 67 BPM, non-specific T-wave abnormalities, \par normal  milliseconds. [de-identified] : (1/18/2023): MCOT 4 weeks. No atrial fibrillation, no SVT, no VT, no pause, no heart block, PVC burden 1%. PAC burden less than 1%. Conclusion no significant arrhythmias average heart rate 71 BPM (range  BPM) [de-identified] : (1/18/2023): 2D Echo. Normal left ventricular ejection fraction. EF 55-60%. Normal RV size and function. No evidence of mitral regurgitation. Dilatation of the aortic root at 3.79 cm.

## 2023-04-27 DIAGNOSIS — Z45.89 ENCOUNTER FOR ADJUSTMENT AND MANAGEMENT OF OTHER IMPLANTED DEVICES: ICD-10-CM

## 2023-05-23 ENCOUNTER — APPOINTMENT (OUTPATIENT)
Dept: RHEUMATOLOGY | Facility: CLINIC | Age: 68
End: 2023-05-23
Payer: MEDICARE

## 2023-05-23 ENCOUNTER — LABORATORY RESULT (OUTPATIENT)
Age: 68
End: 2023-05-23

## 2023-05-23 VITALS
WEIGHT: 135 LBS | SYSTOLIC BLOOD PRESSURE: 189 MMHG | HEIGHT: 68 IN | BODY MASS INDEX: 20.46 KG/M2 | DIASTOLIC BLOOD PRESSURE: 88 MMHG | HEART RATE: 95 BPM | OXYGEN SATURATION: 92 %

## 2023-05-23 VITALS — SYSTOLIC BLOOD PRESSURE: 174 MMHG | DIASTOLIC BLOOD PRESSURE: 94 MMHG

## 2023-05-23 DIAGNOSIS — R73.9 HYPERGLYCEMIA, UNSPECIFIED: ICD-10-CM

## 2023-05-23 DIAGNOSIS — I26.99 OTHER PULMONARY EMBOLISM W/OUT ACUTE COR PULMONALE: ICD-10-CM

## 2023-05-23 PROBLEM — I10 ESSENTIAL (PRIMARY) HYPERTENSION: Chronic | Status: ACTIVE | Noted: 2023-04-10

## 2023-05-23 PROCEDURE — 99214 OFFICE O/P EST MOD 30 MIN: CPT

## 2023-05-23 NOTE — HISTORY OF PRESENT ILLNESS
[FreeTextEntry1] : Since her last appointment in February, pt had her IVC filter removed. She had to go for the procedure twice because she experienced vasovagal syncope with the first attempt. She saw a cardiologist after the syncopal episode and was told that everything looked good. She has been feeling well otherwise, aside from itchy dry skin on her eyelids, her neck and under her L axilla. Denies pain, SOB, eye problems. She has been checking her blood pressure at home and it is usually 110s-120s systolic.\par \par Previous HPI: Pt presented to the hospital 8/22 with acute-onset severe L flank pain, and was found to have hemoperitoneum requiring coil embolization. 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 Pt had labs which demonstrated slightly elevated CRP to 13.9, slightly decreased C3 to 77, and borderline CCP of 20, but no other findings, either in terms of symptoms or labs, to point to any particular underlying etiology for pt's vasculitis. She also had a CTA chest which demonstrated bilateral segmental and subsegmental PEs, and an LE Doppler which demonstrated a R peroneal vein DVT, which were thought to be related to a prothrombotic state in the setting of pt's acute inflammation. Pt received 3 days of pulse-dose Solumedrol 8/24-8/26, and was then switched to 1 mg/kg prednisone and had an IVC filter placed. \par \par Since her discharge from the hospital, pt has been feeling well. She denies pain, swelling anywhere, diarrhea, blood in her stool, rashes, or SOB. She has been walking around and going about her ADLs without any issues. Pt was also started on prn nifedipine during her hospitalization because her blood pressure was noted to be intermittently elevated, but she only had one SBP reading in the 140s at home so far. \par \par Pt does note a long-standing history of "aches and pains" in her head and extremities which come and go. Also, pt's daughter notes that she needed a blood transfusion when she was born and had frequent infections as a child, but does not have any specific diagnoses related to these issues.\par \par In January 2023, pt went for an IVC filter retrieval but the procedure had to be aborted due to bradycardia down to HR 10 after receiving anesthesia. She is currently undergoing a cardiac workup for further evaluation of the bradycardia.\par \par Physical exam: GEN: Pleasant, AAO woman sitting on exam table in NAD\par SKIN: No rashes\par ENT: No LAD\par PULM: Clear to auscultation b/l\par CV: Regular rate and rhythm, no murmurs\par MSK:\par Shoulders: Full ROM b/l\par Elbows: Full ROM b/l, no effusions\par Wrists: Full ROM b/l, no effusions\par Hands: no synovitis\par Hips: Full ROM b/l\par Knees: no effusions, full ROM b/l\par Ankles: no effusions, full ROM b/l\par Feet: no effusions, no TTP\par EXT: no LE edema b/l

## 2023-05-25 ENCOUNTER — NON-APPOINTMENT (OUTPATIENT)
Age: 68
End: 2023-05-25

## 2023-05-25 LAB
25(OH)D3 SERPL-MCNC: 22 NG/ML
ALBUMIN SERPL ELPH-MCNC: 4.9 G/DL
ALP BLD-CCNC: 95 U/L
ALT SERPL-CCNC: 21 U/L
ANION GAP SERPL CALC-SCNC: 17 MMOL/L
APPEARANCE: CLEAR
AST SERPL-CCNC: 22 U/L
BILIRUB SERPL-MCNC: 0.5 MG/DL
BILIRUBIN URINE: NEGATIVE
BLOOD URINE: NEGATIVE
BUN SERPL-MCNC: 16 MG/DL
CALCIUM SERPL-MCNC: 10.4 MG/DL
CHLORIDE SERPL-SCNC: 99 MMOL/L
CO2 SERPL-SCNC: 24 MMOL/L
COLOR: YELLOW
CREAT SERPL-MCNC: 0.6 MG/DL
CREAT SPEC-SCNC: 19 MG/DL
CREAT/PROT UR: <0.3 RATIO
CRP SERPL-MCNC: <3 MG/L
EGFR: 97 ML/MIN/1.73M2
ERYTHROCYTE [SEDIMENTATION RATE] IN BLOOD BY WESTERGREN METHOD: 2 MM/HR
ESTIMATED AVERAGE GLUCOSE: 117 MG/DL
GLUCOSE QUALITATIVE U: NEGATIVE MG/DL
GLUCOSE SERPL-MCNC: 106 MG/DL
HBA1C MFR BLD HPLC: 5.7 %
KETONES URINE: NEGATIVE MG/DL
LEUKOCYTE ESTERASE URINE: ABNORMAL
NITRITE URINE: NEGATIVE
PH URINE: 6.5
POTASSIUM SERPL-SCNC: 4.8 MMOL/L
PROT SERPL-MCNC: 7 G/DL
PROT UR-MCNC: <5 MG/DLG/24H
PROTEIN URINE: NEGATIVE MG/DL
SODIUM SERPL-SCNC: 140 MMOL/L
SPECIFIC GRAVITY URINE: 1.01
TSH SERPL-ACNC: 1.39 UIU/ML
UROBILINOGEN URINE: 0.2 MG/DL

## 2023-07-07 ENCOUNTER — OUTPATIENT (OUTPATIENT)
Dept: OUTPATIENT SERVICES | Facility: HOSPITAL | Age: 68
LOS: 1 days | End: 2023-07-07
Payer: MEDICARE

## 2023-07-07 DIAGNOSIS — Z98.890 OTHER SPECIFIED POSTPROCEDURAL STATES: Chronic | ICD-10-CM

## 2023-07-07 DIAGNOSIS — Z95.828 PRESENCE OF OTHER VASCULAR IMPLANTS AND GRAFTS: Chronic | ICD-10-CM

## 2023-07-07 DIAGNOSIS — Z12.31 ENCOUNTER FOR SCREENING MAMMOGRAM FOR MALIGNANT NEOPLASM OF BREAST: ICD-10-CM

## 2023-07-07 DIAGNOSIS — Z13.820 ENCOUNTER FOR SCREENING FOR OSTEOPOROSIS: ICD-10-CM

## 2023-07-07 PROCEDURE — 77080 DXA BONE DENSITY AXIAL: CPT

## 2023-07-07 PROCEDURE — 77067 SCR MAMMO BI INCL CAD: CPT

## 2023-07-07 PROCEDURE — 77067 SCR MAMMO BI INCL CAD: CPT | Mod: 26

## 2023-07-07 PROCEDURE — 77063 BREAST TOMOSYNTHESIS BI: CPT

## 2023-07-07 PROCEDURE — 77063 BREAST TOMOSYNTHESIS BI: CPT | Mod: 26

## 2023-07-08 DIAGNOSIS — Z13.820 ENCOUNTER FOR SCREENING FOR OSTEOPOROSIS: ICD-10-CM

## 2023-07-08 DIAGNOSIS — Z12.31 ENCOUNTER FOR SCREENING MAMMOGRAM FOR MALIGNANT NEOPLASM OF BREAST: ICD-10-CM

## 2023-07-18 ENCOUNTER — OUTPATIENT (OUTPATIENT)
Dept: OUTPATIENT SERVICES | Facility: HOSPITAL | Age: 68
LOS: 1 days | End: 2023-07-18
Payer: MEDICARE

## 2023-07-18 DIAGNOSIS — Z98.890 OTHER SPECIFIED POSTPROCEDURAL STATES: Chronic | ICD-10-CM

## 2023-07-18 DIAGNOSIS — R92.8 OTHER ABNORMAL AND INCONCLUSIVE FINDINGS ON DIAGNOSTIC IMAGING OF BREAST: ICD-10-CM

## 2023-07-18 DIAGNOSIS — Z95.828 PRESENCE OF OTHER VASCULAR IMPLANTS AND GRAFTS: Chronic | ICD-10-CM

## 2023-07-18 DIAGNOSIS — K55.059 ACUTE (REVERSIBLE) ISCHEMIA OF INTESTINE, PART AND EXTENT UNSPECIFIED: Chronic | ICD-10-CM

## 2023-07-18 PROCEDURE — 76642 ULTRASOUND BREAST LIMITED: CPT | Mod: 26,RT

## 2023-07-18 PROCEDURE — G0279: CPT | Mod: 26

## 2023-07-18 PROCEDURE — 77065 DX MAMMO INCL CAD UNI: CPT | Mod: RT

## 2023-07-18 PROCEDURE — 76642 ULTRASOUND BREAST LIMITED: CPT | Mod: RT

## 2023-07-18 PROCEDURE — G0279: CPT

## 2023-07-18 PROCEDURE — 77065 DX MAMMO INCL CAD UNI: CPT | Mod: 26,RT

## 2023-07-19 DIAGNOSIS — R92.8 OTHER ABNORMAL AND INCONCLUSIVE FINDINGS ON DIAGNOSTIC IMAGING OF BREAST: ICD-10-CM

## 2023-08-22 ENCOUNTER — APPOINTMENT (OUTPATIENT)
Dept: RHEUMATOLOGY | Facility: CLINIC | Age: 68
End: 2023-08-22
Payer: MEDICARE

## 2023-08-22 VITALS
DIASTOLIC BLOOD PRESSURE: 74 MMHG | SYSTOLIC BLOOD PRESSURE: 110 MMHG | WEIGHT: 135 LBS | OXYGEN SATURATION: 98 % | HEIGHT: 68 IN | HEART RATE: 64 BPM | BODY MASS INDEX: 20.46 KG/M2

## 2023-08-22 PROCEDURE — 99214 OFFICE O/P EST MOD 30 MIN: CPT

## 2023-08-22 NOTE — HISTORY OF PRESENT ILLNESS
[FreeTextEntry1] : Pt has been experiencing: - dry patches on her face, saw dermatologist, was told she has eczema, prescribed hydrocortisone cream with no significant improvement. Also has one lesion on each forearm - R lateral leg bruising after a rash - Low back and posterior hip pain with standing or walking for a long time - Calf tightness with walking on a treadmill for one hour, which is her usual exercise but she stopped doing it for several months when she was acutely ill and recently restarted  Pt denies abdominal pain, eye problems.  Previous HPI: Pt presented to the hospital 8/22 with acute-onset severe L flank pain, and was found to have hemoperitoneum requiring coil embolization. 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 Pt had labs which demonstrated slightly elevated CRP to 13.9, slightly decreased C3 to 77, and borderline CCP of 20, but no other findings, either in terms of symptoms or labs, to point to any particular underlying etiology for pt's vasculitis. She also had a CTA chest which demonstrated bilateral segmental and subsegmental PEs, and an LE Doppler which demonstrated a R peroneal vein DVT, which were thought to be related to a prothrombotic state in the setting of pt's acute inflammation. Pt received 3 days of pulse-dose Solumedrol 8/24-8/26, and was then switched to 1 mg/kg prednisone and had an IVC filter placed.   Since her discharge from the hospital, pt has been feeling well. She denies pain, swelling anywhere, diarrhea, blood in her stool, rashes, or SOB. She has been walking around and going about her ADLs without any issues. Pt was also started on prn nifedipine during her hospitalization because her blood pressure was noted to be intermittently elevated, but she only had one SBP reading in the 140s at home so far.   Pt does note a long-standing history of "aches and pains" in her head and extremities which come and go. Also, pt's daughter notes that she needed a blood transfusion when she was born and had frequent infections as a child, but does not have any specific diagnoses related to these issues.  In January 2023, pt went for an IVC filter retrieval but the procedure had to be aborted due to bradycardia down to HR 10 after receiving anesthesia. She is currently undergoing a cardiac workup for further evaluation of the bradycardia.  Physical exam: GEN: Pleasant, AAO woman sitting on exam table in NAD SKIN: + Dry skin on R face. + one 1 cm diameter ecchymosis on each forearm ENT: No LAD PULM: Clear to auscultation b/l CV: Regular rate and rhythm, no murmurs MSK: Shoulders: Full ROM b/l Elbows: Full ROM b/l, no effusions Wrists: Full ROM b/l, no effusions Hands: no synovitis Hips: Full ROM b/l Knees: no effusions, full ROM b/l Ankles: no effusions, full ROM b/l Feet: no effusions, no TTP EXT: no LE edema b/l, + varicose veins

## 2023-08-22 NOTE — ASSESSMENT
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 Aside from acute-onset abdominal pain in the setting of pt's acute bleed, she has had no other symptoms to suggest any particular type of vasculitis, and her bloodwork demonstrated borderline CCP of 20, decreased C3 to 77, normal ESR and only slightly elevated CRP, overall not pointing to any specific subtype of vasculitis. The differential for pt's findings remains a localized GI vasculitis, versus an initial presentation of a systemic vasculitis such as ANCA vasculitis (which is not always ANCA positive). Pt was also found to have acute b/l PEs on CTA chest on 8/29/2022, which have been reported in the setting of various types of vasculitis including ANCA vasculitis, IgA vasculitis and vasculitis secondary to systemic connective tissue diseases, thought to possibly occur due to a prothrombotic state secondary to inflammation. Pt had repeat CTA chest/abdomen/pelvis in 12/2022 which demonstrated interval resolution of vascular abnormalities and hemoperitoneum, as well as resolution of pulmonary emboli.  - f/u ESR, CRP, CMP, CBC, ANCA, UA, UPCR   Low back pain: Previously with imaging demonstrating moderate degenerative changes in L-spine and mild OA in hips. Suspect pt's pain was exacerbated by pausing her exercise - Advised pt to try lidocaine patches and IcyHot patches - Referred to physical therapy - Continue Tylenol prn - Advised pt to try modifying her exercise if her current regimen causes pain  Osteopenia: 7/2023 DEXA with normal spine T-score (although this may have been affected by OA), osteopenia with T-score -1.8 in hip. No prior available - f/u vitamin D level - Continue PO vitamin D  f/u in 3 months  f/u in 3 months

## 2023-08-24 ENCOUNTER — LABORATORY RESULT (OUTPATIENT)
Age: 68
End: 2023-08-24

## 2023-08-29 ENCOUNTER — NON-APPOINTMENT (OUTPATIENT)
Age: 68
End: 2023-08-29

## 2023-08-29 LAB
25(OH)D3 SERPL-MCNC: 28 NG/ML
ALBUMIN SERPL ELPH-MCNC: 4.6 G/DL
ALP BLD-CCNC: 75 U/L
ALT SERPL-CCNC: 16 U/L
ANION GAP SERPL CALC-SCNC: 9 MMOL/L
APPEARANCE: ABNORMAL
AST SERPL-CCNC: 17 U/L
BACTERIA: NEGATIVE /HPF
BILIRUB SERPL-MCNC: 0.4 MG/DL
BILIRUBIN URINE: NEGATIVE
BLOOD URINE: NEGATIVE
BUN SERPL-MCNC: 17 MG/DL
CALCIUM SERPL-MCNC: 9.9 MG/DL
CAST: 0 /LPF
CHLORIDE SERPL-SCNC: 102 MMOL/L
CO2 SERPL-SCNC: 28 MMOL/L
COLOR: YELLOW
CREAT SERPL-MCNC: 0.6 MG/DL
CREAT SPEC-SCNC: 42 MG/DL
CREAT/PROT UR: <0.1 RATIO
CRP SERPL-MCNC: <3 MG/L
EGFR: 96 ML/MIN/1.73M2
EPITHELIAL CELLS: 0 /HPF
ERYTHROCYTE [SEDIMENTATION RATE] IN BLOOD BY WESTERGREN METHOD: 2 MM/HR
ESTIMATED AVERAGE GLUCOSE: 120 MG/DL
GLUCOSE QUALITATIVE U: NEGATIVE MG/DL
GLUCOSE SERPL-MCNC: 114 MG/DL
HBA1C MFR BLD HPLC: 5.8 %
KETONES URINE: NEGATIVE MG/DL
LEUKOCYTE ESTERASE URINE: NEGATIVE
MICROSCOPIC-UA: NORMAL
NITRITE URINE: NEGATIVE
PH URINE: 6.5
POTASSIUM SERPL-SCNC: 4.6 MMOL/L
PROT SERPL-MCNC: 6.5 G/DL
PROT UR-MCNC: <5 MG/DLG/24H
PROTEIN URINE: NEGATIVE MG/DL
RED BLOOD CELLS URINE: 0 /HPF
SODIUM SERPL-SCNC: 139 MMOL/L
SPECIFIC GRAVITY URINE: 1.01
T4 FREE SERPL-MCNC: 1.3 NG/DL
TSH SERPL-ACNC: 2.18 UIU/ML
UROBILINOGEN URINE: 0.2 MG/DL
WHITE BLOOD CELLS URINE: 1 /HPF

## 2023-09-20 NOTE — ED PROCEDURE NOTE - CPROC ED ARTER LINE DETAIL1
[facility]  [id number]  HOSPITAL MEDICINE PROGRESS NOTE   Patient: Gm Kiran  Today's Date: 9/20/2023    YOB: 1937  Admission Date: 9/15/2023    MRN: [id number]  Inpatient LOS: 5    Attending: Rigo Mesa PTA furosemide and aldactone  - continue PTA Using sterile technique, the correct location was identified, and a needle was inserted into the artery (specify in FT)./Positive blood return was obtained via the catheter./Connected to a pressurized flush line./Line was sutured in place./Hemostasis was achieved with direct pressure, and a dry sterile dressing applied./Ultrasound guidance was used.

## 2023-11-28 ENCOUNTER — LABORATORY RESULT (OUTPATIENT)
Age: 68
End: 2023-11-28

## 2023-11-28 ENCOUNTER — APPOINTMENT (OUTPATIENT)
Dept: RHEUMATOLOGY | Facility: CLINIC | Age: 68
End: 2023-11-28
Payer: MEDICARE

## 2023-11-28 VITALS
WEIGHT: 135 LBS | HEIGHT: 67 IN | DIASTOLIC BLOOD PRESSURE: 91 MMHG | BODY MASS INDEX: 21.19 KG/M2 | TEMPERATURE: 97.9 F | SYSTOLIC BLOOD PRESSURE: 143 MMHG | OXYGEN SATURATION: 96 % | HEART RATE: 74 BPM

## 2023-11-28 DIAGNOSIS — M54.50 LOW BACK PAIN, UNSPECIFIED: ICD-10-CM

## 2023-11-28 DIAGNOSIS — M85.80 OTHER SPECIFIED DISORDERS OF BONE DENSITY AND STRUCTURE, UNSPECIFIED SITE: ICD-10-CM

## 2023-11-28 DIAGNOSIS — G89.29 LOW BACK PAIN, UNSPECIFIED: ICD-10-CM

## 2023-11-28 DIAGNOSIS — E55.9 VITAMIN D DEFICIENCY, UNSPECIFIED: ICD-10-CM

## 2023-11-28 PROCEDURE — 99214 OFFICE O/P EST MOD 30 MIN: CPT

## 2023-12-01 LAB
25(OH)D3 SERPL-MCNC: 39 NG/ML
ALBUMIN SERPL ELPH-MCNC: 4.9 G/DL
ALP BLD-CCNC: 71 U/L
ALT SERPL-CCNC: 19 U/L
ANION GAP SERPL CALC-SCNC: 13 MMOL/L
APPEARANCE: CLEAR
AST SERPL-CCNC: 22 U/L
BACTERIA: NEGATIVE /HPF
BASOPHILS # BLD AUTO: 0.05 K/UL
BASOPHILS NFR BLD AUTO: 1 %
BILIRUB SERPL-MCNC: 0.5 MG/DL
BILIRUBIN URINE: NEGATIVE
BLOOD URINE: NEGATIVE
BUN SERPL-MCNC: 17 MG/DL
C3 SERPL-MCNC: 96 MG/DL
C4 SERPL-MCNC: 23 MG/DL
CALCIUM SERPL-MCNC: 9.8 MG/DL
CAST: 1 /LPF
CHLORIDE SERPL-SCNC: 102 MMOL/L
CO2 SERPL-SCNC: 26 MMOL/L
COLOR: YELLOW
CREAT SERPL-MCNC: 0.7 MG/DL
CREAT SPEC-SCNC: 147 MG/DL
CREAT/PROT UR: 0.1 RATIO
CRP SERPL-MCNC: <3 MG/L
EGFR: 93 ML/MIN/1.73M2
EOSINOPHIL # BLD AUTO: 0.15 K/UL
EOSINOPHIL NFR BLD AUTO: 3 %
EPITHELIAL CELLS: 1 /HPF
ERYTHROCYTE [SEDIMENTATION RATE] IN BLOOD BY WESTERGREN METHOD: 1 MM/HR
GLUCOSE QUALITATIVE U: NEGATIVE MG/DL
GLUCOSE SERPL-MCNC: 100 MG/DL
HCT VFR BLD CALC: 47.3 %
HGB BLD-MCNC: 15.2 G/DL
IMM GRANULOCYTES NFR BLD AUTO: 0.4 %
KETONES URINE: NEGATIVE MG/DL
LEUKOCYTE ESTERASE URINE: ABNORMAL
LYMPHOCYTES # BLD AUTO: 1.41 K/UL
LYMPHOCYTES NFR BLD AUTO: 27.9 %
MAN DIFF?: NORMAL
MCHC RBC-ENTMCNC: 28 PG
MCHC RBC-ENTMCNC: 32.1 G/DL
MCV RBC AUTO: 87.1 FL
MICROSCOPIC-UA: NORMAL
MONOCYTES # BLD AUTO: 0.41 K/UL
MONOCYTES NFR BLD AUTO: 8.1 %
NEUTROPHILS # BLD AUTO: 3.01 K/UL
NEUTROPHILS NFR BLD AUTO: 59.6 %
NITRITE URINE: NEGATIVE
PH URINE: 6
PLATELET # BLD AUTO: 201 K/UL
POTASSIUM SERPL-SCNC: 4.2 MMOL/L
PROT SERPL-MCNC: 6.9 G/DL
PROT UR-MCNC: 18 MG/DLG/24H
PROTEIN URINE: NORMAL MG/DL
RBC # BLD: 5.43 M/UL
RBC # FLD: 12.7 %
RED BLOOD CELLS URINE: 2 /HPF
SODIUM SERPL-SCNC: 141 MMOL/L
SPECIFIC GRAVITY URINE: 1.02
UROBILINOGEN URINE: 1 MG/DL
WBC # FLD AUTO: 5.05 K/UL
WHITE BLOOD CELLS URINE: 3 /HPF

## 2024-01-22 ENCOUNTER — APPOINTMENT (OUTPATIENT)
Dept: CARDIOLOGY | Facility: CLINIC | Age: 69
End: 2024-01-22

## 2024-01-22 ENCOUNTER — APPOINTMENT (OUTPATIENT)
Dept: CARDIOLOGY | Facility: CLINIC | Age: 69
End: 2024-01-22
Payer: MEDICARE

## 2024-01-22 VITALS
HEIGHT: 67 IN | SYSTOLIC BLOOD PRESSURE: 138 MMHG | WEIGHT: 139 LBS | HEART RATE: 71 BPM | DIASTOLIC BLOOD PRESSURE: 70 MMHG | BODY MASS INDEX: 21.82 KG/M2

## 2024-01-22 DIAGNOSIS — R55 SYNCOPE AND COLLAPSE: ICD-10-CM

## 2024-01-22 DIAGNOSIS — I10 ESSENTIAL (PRIMARY) HYPERTENSION: ICD-10-CM

## 2024-01-22 PROCEDURE — 99204 OFFICE O/P NEW MOD 45 MIN: CPT

## 2024-01-22 PROCEDURE — 93000 ELECTROCARDIOGRAM COMPLETE: CPT

## 2024-01-24 ENCOUNTER — APPOINTMENT (OUTPATIENT)
Dept: RHEUMATOLOGY | Facility: CLINIC | Age: 69
End: 2024-01-24
Payer: MEDICARE

## 2024-01-24 VITALS
WEIGHT: 139 LBS | HEIGHT: 67 IN | BODY MASS INDEX: 21.82 KG/M2 | SYSTOLIC BLOOD PRESSURE: 130 MMHG | HEART RATE: 72 BPM | OXYGEN SATURATION: 92 % | DIASTOLIC BLOOD PRESSURE: 85 MMHG

## 2024-01-24 DIAGNOSIS — R25.2 CRAMP AND SPASM: ICD-10-CM

## 2024-01-24 PROCEDURE — G2211 COMPLEX E/M VISIT ADD ON: CPT

## 2024-01-24 PROCEDURE — 99214 OFFICE O/P EST MOD 30 MIN: CPT

## 2024-01-24 RX ORDER — GABAPENTIN 100 MG/1
100 CAPSULE ORAL
Qty: 270 | Refills: 2 | Status: DISCONTINUED | COMMUNITY
Start: 2023-11-28 | End: 2024-01-24

## 2024-01-24 RX ORDER — NIFEDIPINE 60 MG
60 TABLET, EXTENDED RELEASE ORAL DAILY
Refills: 0 | Status: ACTIVE | COMMUNITY

## 2024-01-24 NOTE — ASSESSMENT
[FreeTextEntry1] : Low back pain: Previously with imaging demonstrating moderate degenerative changes in L-spine and mild OA in hips. 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 Aside from acute-onset abdominal pain in the setting of pt's acute bleed, she has had no other symptoms to suggest any particular type of vasculitis, and her bloodwork demonstrated borderline CCP of 20, decreased C3 to 77, normal ESR and only slightly elevated CRP, overall not pointing to any specific subtype of vasculitis. The differential for pt's findings remains a localized GI vasculitis, versus an initial presentation of a systemic vasculitis such as ANCA vasculitis (which is not always ANCA positive). Pt was also found to have acute b/l PEs on CTA chest on 8/29/2022, which have been reported in the setting of various types of vasculitis including ANCA vasculitis, IgA vasculitis and vasculitis secondary to systemic connective tissue diseases, thought to possibly occur due to a prothrombotic state secondary to inflammation. Pt had repeat CTA chest/abdomen/pelvis in 12/2022 which demonstrated interval resolution of vascular abnormalities and hemoperitoneum, as well as resolution of pulmonary emboli. - f/u ESR, CRP, CMP, CBC, ANCA, UA, UPCR at next visit  Osteopenia: 7/2023 DEXA with normal spine T-score (although this may have been affected by OA), osteopenia with T-score -1.8 in hip. No prior available - Continue vitamin D 4000 IU q day  f/u in 2 months, will check labs at next visit

## 2024-01-24 NOTE — HISTORY OF PRESENT ILLNESS
[FreeTextEntry1] : Pt continues to experience low back pain, improved with leaning forward. She has been walking for 45 minutes on the treadmill with improvement in her calf cramping compared to 1 hour exercises. + Continued LE cramps, pt did not try vitamin B or E. + Drowsiness with gabapentin 100 mg qhs.   Previous HPI: Pt presented to the hospital 8/22 with acute-onset severe L flank pain, and was found to have hemoperitoneum requiring coil embolization. 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 Pt had labs which demonstrated slightly elevated CRP to 13.9, slightly decreased C3 to 77, and borderline CCP of 20, but no other findings, either in terms of symptoms or labs, to point to any particular underlying etiology for pt's vasculitis. She also had a CTA chest which demonstrated bilateral segmental and subsegmental PEs, and an LE Doppler which demonstrated a R peroneal vein DVT, which were thought to be related to a prothrombotic state in the setting of pt's acute inflammation. Pt received 3 days of pulse-dose Solumedrol 8/24-8/26, and was then switched to 1 mg/kg prednisone and had an IVC filter placed.   Since her discharge from the hospital, pt has been feeling well. She denies pain, swelling anywhere, diarrhea, blood in her stool, rashes, or SOB. She has been walking around and going about her ADLs without any issues. Pt was also started on prn nifedipine during her hospitalization because her blood pressure was noted to be intermittently elevated, but she only had one SBP reading in the 140s at home so far.   Pt does note a long-standing history of "aches and pains" in her head and extremities which come and go. Also, pt's daughter notes that she needed a blood transfusion when she was born and had frequent infections as a child, but does not have any specific diagnoses related to these issues.  In January 2023, pt went for an IVC filter retrieval but the procedure had to be aborted due to bradycardia down to HR 10 after receiving anesthesia. She is currently undergoing a cardiac workup for further evaluation of the bradycardia.  Physical exam: GEN: Pleasant, AAO woman sitting on exam table in NAD ENT: No LAD PULM: Clear to auscultation b/l CV: Regular rate and rhythm, no murmurs MSK: Shoulders: Full ROM b/l Elbows: Full ROM b/l, no effusions Wrists: Full ROM b/l, no effusions Hands: no synovitis Hips: Full ROM b/l Knees: no effusions, full ROM b/l Ankles: no effusions, full ROM b/l Feet: no effusions, no TTP EXT: no LE edema b/l, + varicose veins

## 2024-02-16 PROBLEM — I10 BENIGN ESSENTIAL HYPERTENSION: Status: ACTIVE | Noted: 2024-02-16

## 2024-02-16 PROBLEM — R55 VAGAL REACTION: Status: ACTIVE | Noted: 2023-03-28

## 2024-02-16 RX ORDER — TACROLIMUS 1 MG/G
0.1 OINTMENT TOPICAL TWICE DAILY
Refills: 0 | Status: ACTIVE | COMMUNITY

## 2024-02-16 NOTE — HISTORY OF PRESENT ILLNESS
[FreeTextEntry1] : 68-year-old female presents to establish care.  Bradycardia during IVC filter removal (likely vagal) No other cardiac history  Feels well.  Relatively active.  No exertional symptoms.  No chest pain.  Breathing comfortable.  No palpitations, lightheadedness, syncope.  ECHO (2023): Normal chambers and biventricular function.  No significant valve disease.  3.7 cm aorta (normal on 2022 CTA)  Labs reviewed (11/2023): CBC, CMP unremarkable   PMH/PSH: Hypertension Undefined GI vasculitis (intra-abdominal hemorrhage status post coil embolization) DVT / PE status post IVC filter (removed)  Melanoma Ovarian surgery  SOCIAL: Former smoker

## 2024-02-16 NOTE — ASSESSMENT
[FreeTextEntry1] : Senior female without cardiac history. Reasonable functional capacity without cardiac symptoms. Reassuring ECHO  BP controlled  Prior bradycardia episode likely vagal  GI versus systemic vasculitis (on immunosuppressive therapy, follows with rheumatologist)

## 2024-02-16 NOTE — PHYSICAL EXAM
walk in c/o anxiety worsening over 2days, tearful, denies SI or HI. Denies CP or SOB, + hx of similar many years ago, not currently on any medications. Denies etoh or drug use [de-identified] : Well-appearing, no distress [de-identified] : Regular, normal S1-S2, no murmur, rub, gallop [de-identified] : CTA [de-identified] : No edema [de-identified] : Alert, normal affect, logical conversation

## 2024-02-16 NOTE — DISCUSSION/SUMMARY
[EKG obtained to assist in diagnosis and management of assessed problem(s)] : EKG obtained to assist in diagnosis and management of assessed problem(s) [FreeTextEntry1] : Continue nifedipine Exercise PMD and rheumatology follow-up Follow-up 1 year or as needed

## 2024-03-01 ENCOUNTER — NON-APPOINTMENT (OUTPATIENT)
Age: 69
End: 2024-03-01

## 2024-04-15 ENCOUNTER — RX RENEWAL (OUTPATIENT)
Age: 69
End: 2024-04-15

## 2024-04-23 ENCOUNTER — APPOINTMENT (OUTPATIENT)
Dept: RHEUMATOLOGY | Facility: CLINIC | Age: 69
End: 2024-04-23
Payer: MEDICARE

## 2024-04-23 ENCOUNTER — LABORATORY RESULT (OUTPATIENT)
Age: 69
End: 2024-04-23

## 2024-04-23 VITALS
HEART RATE: 72 BPM | BODY MASS INDEX: 21.19 KG/M2 | DIASTOLIC BLOOD PRESSURE: 80 MMHG | WEIGHT: 135 LBS | OXYGEN SATURATION: 97 % | SYSTOLIC BLOOD PRESSURE: 128 MMHG | HEIGHT: 67 IN | TEMPERATURE: 97.8 F

## 2024-04-23 DIAGNOSIS — I77.6 ARTERITIS, UNSPECIFIED: ICD-10-CM

## 2024-04-23 DIAGNOSIS — Z00.00 ENCOUNTER FOR GENERAL ADULT MEDICAL EXAMINATION W/OUT ABNORMAL FINDINGS: ICD-10-CM

## 2024-04-23 PROCEDURE — G2211 COMPLEX E/M VISIT ADD ON: CPT

## 2024-04-23 PROCEDURE — 99214 OFFICE O/P EST MOD 30 MIN: CPT

## 2024-04-23 RX ORDER — VITAMIN B COMPLEX
CAPSULE ORAL
Qty: 270 | Refills: 1 | Status: ACTIVE | COMMUNITY
Start: 2024-01-24 | End: 1900-01-01

## 2024-04-23 RX ORDER — DULOXETINE HYDROCHLORIDE 20 MG/1
20 CAPSULE, DELAYED RELEASE PELLETS ORAL
Qty: 30 | Refills: 2 | Status: ACTIVE | COMMUNITY
Start: 2024-01-24 | End: 1900-01-01

## 2024-04-23 NOTE — HISTORY OF PRESENT ILLNESS
[FreeTextEntry1] : Pt's back is feeling better on the duloxetine 20 mg q day. She still has back pain on some days but her back feels well other days. She is now up to 50 minutes on the treadmill daily for exercise. She denies any side effects from the duloxetine. Pt has been feeling okay otherwise, aside from gradually blurrier vision than before. The vitamin B and E are also helping with her LE cramps.  Previous HPI: Pt presented to the hospital 8/22 with acute-onset severe L flank pain, and was found to have hemoperitoneum requiring coil embolization. 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 Pt had labs which demonstrated slightly elevated CRP to 13.9, slightly decreased C3 to 77, and borderline CCP of 20, but no other findings, either in terms of symptoms or labs, to point to any particular underlying etiology for pt's vasculitis. She also had a CTA chest which demonstrated bilateral segmental and subsegmental PEs, and an LE Doppler which demonstrated a R peroneal vein DVT, which were thought to be related to a prothrombotic state in the setting of pt's acute inflammation. Pt received 3 days of pulse-dose Solumedrol 8/24-8/26, and was then switched to 1 mg/kg prednisone and had an IVC filter placed.   Since her discharge from the hospital, pt has been feeling well. She denies pain, swelling anywhere, diarrhea, blood in her stool, rashes, or SOB. She has been walking around and going about her ADLs without any issues. Pt was also started on prn nifedipine during her hospitalization because her blood pressure was noted to be intermittently elevated, but she only had one SBP reading in the 140s at home so far.   Pt does note a long-standing history of "aches and pains" in her head and extremities which come and go. Also, pt's daughter notes that she needed a blood transfusion when she was born and had frequent infections as a child, but does not have any specific diagnoses related to these issues.  In January 2023, pt went for an IVC filter retrieval but the procedure had to be aborted due to bradycardia down to HR 10 after receiving anesthesia. She is currently undergoing a cardiac workup for further evaluation of the bradycardia.  Physical exam: GEN: Pleasant, AAO woman sitting on exam table in NAD ENT: No LAD PULM: Clear to auscultation b/l CV: Regular rate and rhythm, no murmurs MSK: Shoulders: Full ROM b/l Elbows: Full ROM b/l, no effusions Wrists: Full ROM b/l, no effusions Hands: no synovitis Hips: Full ROM b/l Knees: no effusions, full ROM b/l Ankles: no effusions, full ROM b/l Feet: no effusions, no TTP EXT: no LE edema b/l, + varicose veins, normal nailfold capillaries

## 2024-04-23 NOTE — ASSESSMENT
[FreeTextEntry1] : Low back pain: Previously with imaging demonstrating moderate degenerative changes in L-spine and mild OA in hips. 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 Aside from acute-onset abdominal pain in the setting of pt's acute bleed, she has had no other symptoms to suggest any particular type of vasculitis, and her bloodwork demonstrated borderline CCP of 20, decreased C3 to 77, normal ESR and only slightly elevated CRP, overall not pointing to any specific subtype of vasculitis. The differential for pt's findings remains a localized GI vasculitis, versus an initial presentation of a systemic vasculitis such as ANCA vasculitis (which is not always ANCA positive). Pt was also found to have acute b/l PEs on CTA chest on 8/29/2022, which have been reported in the setting of various types of vasculitis including ANCA vasculitis, IgA vasculitis and vasculitis secondary to systemic connective tissue diseases, thought to possibly occur due to a prothrombotic state secondary to inflammation. Pt had repeat CTA chest/abdomen/pelvis in 12/2022 which demonstrated interval resolution of vascular abnormalities and hemoperitoneum, as well as resolution of pulmonary emboli. - f/u ESR, CRP, CMP, CBC, ANCA, UA, UPCR  Osteopenia: 7/2023 DEXA with normal spine T-score (although this may have been affected by OA), osteopenia with T-score -1.8 in hip. No prior available - Continue vitamin D 4000 IU q day  f/u in 4 months, will call pt with lab results

## 2024-04-26 ENCOUNTER — NON-APPOINTMENT (OUTPATIENT)
Age: 69
End: 2024-04-26

## 2024-04-26 LAB
ALBUMIN SERPL ELPH-MCNC: 4.6 G/DL
ALP BLD-CCNC: 81 U/L
ALT SERPL-CCNC: 24 U/L
ANION GAP SERPL CALC-SCNC: 11 MMOL/L
APPEARANCE: CLEAR
AST SERPL-CCNC: 21 U/L
BACTERIA: NEGATIVE /HPF
BASOPHILS # BLD AUTO: 0.04 K/UL
BASOPHILS NFR BLD AUTO: 0.7 %
BILIRUB SERPL-MCNC: 0.5 MG/DL
BILIRUBIN URINE: NEGATIVE
BLOOD URINE: NEGATIVE
BUN SERPL-MCNC: 16 MG/DL
C3 SERPL-MCNC: 96 MG/DL
C4 SERPL-MCNC: 26 MG/DL
CALCIUM SERPL-MCNC: 10 MG/DL
CAST: 0 /LPF
CHLORIDE SERPL-SCNC: 101 MMOL/L
CHOLEST SERPL-MCNC: 202 MG/DL
CO2 SERPL-SCNC: 28 MMOL/L
COLOR: NORMAL
CREAT SERPL-MCNC: 0.7 MG/DL
CREAT SPEC-SCNC: 122 MG/DL
CREAT/PROT UR: 0.1 RATIO
CRP SERPL-MCNC: <3 MG/L
EGFR: 93 ML/MIN/1.73M2
EOSINOPHIL # BLD AUTO: 0.2 K/UL
EOSINOPHIL NFR BLD AUTO: 3.3 %
EPITHELIAL CELLS: 1 /HPF
ERYTHROCYTE [SEDIMENTATION RATE] IN BLOOD BY WESTERGREN METHOD: 1 MM/HR
ESTIMATED AVERAGE GLUCOSE: 117 MG/DL
GLUCOSE QUALITATIVE U: NEGATIVE MG/DL
GLUCOSE SERPL-MCNC: 102 MG/DL
HBA1C MFR BLD HPLC: 5.7 %
HCT VFR BLD CALC: 46.2 %
HDLC SERPL-MCNC: 79 MG/DL
HGB BLD-MCNC: 15.3 G/DL
IMM GRANULOCYTES NFR BLD AUTO: 0.3 %
KETONES URINE: NEGATIVE MG/DL
LDLC SERPL CALC-MCNC: 111 MG/DL
LEUKOCYTE ESTERASE URINE: NEGATIVE
LYMPHOCYTES # BLD AUTO: 1.78 K/UL
LYMPHOCYTES NFR BLD AUTO: 29.3 %
MAN DIFF?: NORMAL
MCHC RBC-ENTMCNC: 28.3 PG
MCHC RBC-ENTMCNC: 33.1 G/DL
MCV RBC AUTO: 85.6 FL
MICROSCOPIC-UA: NORMAL
MONOCYTES # BLD AUTO: 0.53 K/UL
MONOCYTES NFR BLD AUTO: 8.7 %
NEUTROPHILS # BLD AUTO: 3.51 K/UL
NEUTROPHILS NFR BLD AUTO: 57.7 %
NITRITE URINE: NEGATIVE
NONHDLC SERPL-MCNC: 123 MG/DL
PH URINE: 6
PLATELET # BLD AUTO: 211 K/UL
PMV BLD AUTO: 0 /100 WBCS
POTASSIUM SERPL-SCNC: 4.6 MMOL/L
PROT SERPL-MCNC: 6.9 G/DL
PROT UR-MCNC: 11 MG/DLG/24H
PROTEIN URINE: NEGATIVE MG/DL
RBC # BLD: 5.4 M/UL
RBC # FLD: 13.2 %
RED BLOOD CELLS URINE: 3 /HPF
SODIUM SERPL-SCNC: 140 MMOL/L
SPECIFIC GRAVITY URINE: 1.02
T4 FREE SERPL-MCNC: 1.3 NG/DL
TRIGL SERPL-MCNC: 62 MG/DL
TSH SERPL-ACNC: 1.87 UIU/ML
UROBILINOGEN URINE: 0.2 MG/DL
WBC # FLD AUTO: 6.08 K/UL
WHITE BLOOD CELLS URINE: 0 /HPF

## 2024-07-31 ENCOUNTER — OUTPATIENT (OUTPATIENT)
Dept: OUTPATIENT SERVICES | Facility: HOSPITAL | Age: 69
LOS: 1 days | End: 2024-07-31
Payer: MEDICARE

## 2024-07-31 DIAGNOSIS — K55.059 ACUTE (REVERSIBLE) ISCHEMIA OF INTESTINE, PART AND EXTENT UNSPECIFIED: Chronic | ICD-10-CM

## 2024-07-31 DIAGNOSIS — Z95.828 PRESENCE OF OTHER VASCULAR IMPLANTS AND GRAFTS: Chronic | ICD-10-CM

## 2024-07-31 DIAGNOSIS — Z98.890 OTHER SPECIFIED POSTPROCEDURAL STATES: Chronic | ICD-10-CM

## 2024-07-31 DIAGNOSIS — M54.50 LOW BACK PAIN, UNSPECIFIED: ICD-10-CM

## 2024-07-31 PROCEDURE — 72110 X-RAY EXAM L-2 SPINE 4/>VWS: CPT | Mod: 26

## 2024-07-31 PROCEDURE — 72110 X-RAY EXAM L-2 SPINE 4/>VWS: CPT

## 2024-08-01 DIAGNOSIS — M54.50 LOW BACK PAIN, UNSPECIFIED: ICD-10-CM

## 2024-08-08 ENCOUNTER — OUTPATIENT (OUTPATIENT)
Dept: OUTPATIENT SERVICES | Facility: HOSPITAL | Age: 69
LOS: 1 days | End: 2024-08-08
Payer: MEDICARE

## 2024-08-08 DIAGNOSIS — Z12.31 ENCOUNTER FOR SCREENING MAMMOGRAM FOR MALIGNANT NEOPLASM OF BREAST: ICD-10-CM

## 2024-08-08 DIAGNOSIS — Z95.828 PRESENCE OF OTHER VASCULAR IMPLANTS AND GRAFTS: Chronic | ICD-10-CM

## 2024-08-08 DIAGNOSIS — K55.059 ACUTE (REVERSIBLE) ISCHEMIA OF INTESTINE, PART AND EXTENT UNSPECIFIED: Chronic | ICD-10-CM

## 2024-08-08 DIAGNOSIS — Z98.890 OTHER SPECIFIED POSTPROCEDURAL STATES: Chronic | ICD-10-CM

## 2024-08-08 PROCEDURE — 77067 SCR MAMMO BI INCL CAD: CPT

## 2024-08-08 PROCEDURE — 77067 SCR MAMMO BI INCL CAD: CPT | Mod: 26

## 2024-08-08 PROCEDURE — 77063 BREAST TOMOSYNTHESIS BI: CPT

## 2024-08-08 PROCEDURE — 77063 BREAST TOMOSYNTHESIS BI: CPT | Mod: 26

## 2024-08-09 DIAGNOSIS — Z12.31 ENCOUNTER FOR SCREENING MAMMOGRAM FOR MALIGNANT NEOPLASM OF BREAST: ICD-10-CM

## 2024-08-20 ENCOUNTER — APPOINTMENT (OUTPATIENT)
Dept: RHEUMATOLOGY | Facility: CLINIC | Age: 69
End: 2024-08-20
Payer: MEDICARE

## 2024-08-20 ENCOUNTER — LABORATORY RESULT (OUTPATIENT)
Age: 69
End: 2024-08-20

## 2024-08-20 VITALS
BODY MASS INDEX: 21.5 KG/M2 | DIASTOLIC BLOOD PRESSURE: 104 MMHG | TEMPERATURE: 97.9 F | HEIGHT: 67 IN | HEART RATE: 79 BPM | OXYGEN SATURATION: 98 % | WEIGHT: 137 LBS | SYSTOLIC BLOOD PRESSURE: 181 MMHG

## 2024-08-20 DIAGNOSIS — I77.6 ARTERITIS, UNSPECIFIED: ICD-10-CM

## 2024-08-20 PROCEDURE — G2211 COMPLEX E/M VISIT ADD ON: CPT

## 2024-08-20 PROCEDURE — 99214 OFFICE O/P EST MOD 30 MIN: CPT

## 2024-08-20 NOTE — HISTORY OF PRESENT ILLNESS
[FreeTextEntry1] : Pt continues to experience low back pain. She was referred for PT by her PMD Dr. Mao, and has had 2 sessions so far. She is not sure if the duloxetine is making a difference to her symptoms. + Erratic BP readings at home, fluctuate from 100s-130s SBP, she is not taking nifedipine, was previously advised to only take it prn BP >140/90. + Recently diagnosed cataracts, pt is going back to ophthalmologist next week to discuss treatment options. + Anxiety related to her health conditions.  Previous HPI: Pt presented to the hospital 8/22 with acute-onset severe L flank pain, and was found to have hemoperitoneum requiring coil embolization. 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 Pt had labs which demonstrated slightly elevated CRP to 13.9, slightly decreased C3 to 77, and borderline CCP of 20, but no other findings, either in terms of symptoms or labs, to point to any particular underlying etiology for pt's vasculitis. She also had a CTA chest which demonstrated bilateral segmental and subsegmental PEs, and an LE Doppler which demonstrated a R peroneal vein DVT, which were thought to be related to a prothrombotic state in the setting of pt's acute inflammation. Pt received 3 days of pulse-dose Solumedrol 8/24-8/26, and was then switched to 1 mg/kg prednisone and had an IVC filter placed.   Since her discharge from the hospital, pt has been feeling well. She denies pain, swelling anywhere, diarrhea, blood in her stool, rashes, or SOB. She has been walking around and going about her ADLs without any issues. Pt was also started on prn nifedipine during her hospitalization because her blood pressure was noted to be intermittently elevated, but she only had one SBP reading in the 140s at home so far.   Pt does note a long-standing history of "aches and pains" in her head and extremities which come and go. Also, pt's daughter notes that she needed a blood transfusion when she was born and had frequent infections as a child, but does not have any specific diagnoses related to these issues.  In January 2023, pt went for an IVC filter retrieval but the procedure had to be aborted due to bradycardia down to HR 10 after receiving anesthesia. She is currently undergoing a cardiac workup for further evaluation of the bradycardia.  Physical exam: GEN: Pleasant, AAO woman sitting on exam table in NAD ENT: No LAD PULM: Clear to auscultation b/l CV: Regular rate and rhythm, no murmurs MSK: Shoulders: Full ROM b/l Elbows: Full ROM b/l, no effusions Wrists: Full ROM b/l, no effusions Hands: no synovitis Hips: Full ROM b/l Knees: no effusions, full ROM b/l Ankles: no effusions, full ROM b/l Feet: no effusions, no TTP EXT: no LE edema b/l, + varicose veins, normal nailfold capillaries

## 2024-08-20 NOTE — ASSESSMENT
[FreeTextEntry1] : Low back pain: Previously with imaging demonstrating moderate degenerative changes in L-spine and mild OA in hips. 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 Aside from acute-onset abdominal pain in the setting of pt's acute bleed, she has had no other symptoms to suggest any particular type of vasculitis, and her bloodwork demonstrated borderline CCP of 20, decreased C3 to 77, normal ESR and only slightly elevated CRP, overall not pointing to any specific subtype of vasculitis. The differential for pt's findings remains a localized GI vasculitis, versus an initial presentation of a systemic vasculitis such as ANCA vasculitis (which is not always ANCA positive). Pt was also found to have acute b/l PEs on CTA chest on 8/29/2022, which have been reported in the setting of various types of vasculitis including ANCA vasculitis, IgA vasculitis and vasculitis secondary to systemic connective tissue diseases, thought to possibly occur due to a prothrombotic state secondary to inflammation. Pt had repeat CTA chest/abdomen/pelvis in 12/2022 which demonstrated interval resolution of vascular abnormalities and hemoperitoneum, as well as resolution of pulmonary emboli. - f/u ESR, CRP, CBC, ANCA, UA, UPCR, normal CMP recently  Osteopenia: 7/2023 DEXA with normal spine T-score (although this may have been affected by OA), osteopenia with T-score -1.8 in hip. No prior available - Continue vitamin D 4000 IU q day  f/u in 4 months, will call pt with lab results

## 2024-08-22 LAB
APPEARANCE: CLEAR
BACTERIA: NEGATIVE /HPF
BASOPHILS # BLD AUTO: 0.04 K/UL
BASOPHILS NFR BLD AUTO: 0.8 %
BILIRUBIN URINE: NEGATIVE
BLOOD URINE: NEGATIVE
C3 SERPL-MCNC: 96 MG/DL
C4 SERPL-MCNC: 26 MG/DL
CAST: 0 /LPF
COLOR: NORMAL
CREAT SPEC-SCNC: 161 MG/DL
CREAT/PROT UR: 0.1 RATIO
CRP SERPL-MCNC: <3 MG/L
EOSINOPHIL # BLD AUTO: 0.16 K/UL
EOSINOPHIL NFR BLD AUTO: 3.1 %
EPITHELIAL CELLS: 1 /HPF
ERYTHROCYTE [SEDIMENTATION RATE] IN BLOOD BY WESTERGREN METHOD: 1 MM/HR
GLUCOSE QUALITATIVE U: NEGATIVE MG/DL
HCT VFR BLD CALC: 46.5 %
HGB BLD-MCNC: 15.2 G/DL
IMM GRANULOCYTES NFR BLD AUTO: 0.2 %
KETONES URINE: NEGATIVE MG/DL
LEUKOCYTE ESTERASE URINE: NEGATIVE
LYMPHOCYTES # BLD AUTO: 1.7 K/UL
LYMPHOCYTES NFR BLD AUTO: 32.7 %
MAN DIFF?: NORMAL
MCHC RBC-ENTMCNC: 28.6 PG
MCHC RBC-ENTMCNC: 32.7 G/DL
MCV RBC AUTO: 87.6 FL
MICROSCOPIC-UA: NORMAL
MONOCYTES # BLD AUTO: 0.37 K/UL
MONOCYTES NFR BLD AUTO: 7.1 %
NEUTROPHILS # BLD AUTO: 2.92 K/UL
NEUTROPHILS NFR BLD AUTO: 56.1 %
NITRITE URINE: NEGATIVE
PH URINE: 6
PLATELET # BLD AUTO: 200 K/UL
PMV BLD AUTO: 0 /100 WBCS
PROT UR-MCNC: 24 MG/DLG/24H
PROTEIN URINE: NORMAL MG/DL
RBC # BLD: 5.31 M/UL
RBC # FLD: 13 %
RED BLOOD CELLS URINE: 2 /HPF
SPECIFIC GRAVITY URINE: 1.02
UROBILINOGEN URINE: 0.2 MG/DL
WBC # FLD AUTO: 5.2 K/UL
WHITE BLOOD CELLS URINE: 1 /HPF

## 2024-10-15 ENCOUNTER — NON-APPOINTMENT (OUTPATIENT)
Age: 69
End: 2024-10-15

## 2024-12-10 ENCOUNTER — LABORATORY RESULT (OUTPATIENT)
Age: 69
End: 2024-12-10

## 2024-12-10 ENCOUNTER — APPOINTMENT (OUTPATIENT)
Dept: RHEUMATOLOGY | Facility: CLINIC | Age: 69
End: 2024-12-10
Payer: MEDICARE

## 2024-12-10 VITALS
HEIGHT: 67 IN | WEIGHT: 137 LBS | TEMPERATURE: 97.8 F | BODY MASS INDEX: 21.5 KG/M2 | DIASTOLIC BLOOD PRESSURE: 90 MMHG | SYSTOLIC BLOOD PRESSURE: 160 MMHG

## 2024-12-10 PROCEDURE — 99214 OFFICE O/P EST MOD 30 MIN: CPT

## 2024-12-10 RX ORDER — PREGABALIN 50 MG/1
50 CAPSULE ORAL
Qty: 30 | Refills: 2 | Status: ACTIVE | COMMUNITY
Start: 2024-12-10 | End: 1900-01-01

## 2024-12-12 DIAGNOSIS — I77.6 ARTERITIS, UNSPECIFIED: ICD-10-CM

## 2024-12-12 LAB
ALBUMIN SERPL ELPH-MCNC: 4.5 G/DL
ALP BLD-CCNC: 89 U/L
ALT SERPL-CCNC: 15 U/L
ANION GAP SERPL CALC-SCNC: 12 MMOL/L
APPEARANCE: CLEAR
AST SERPL-CCNC: 22 U/L
BACTERIA: NEGATIVE /HPF
BASOPHILS # BLD AUTO: 0.04 K/UL
BASOPHILS NFR BLD AUTO: 0.6 %
BILIRUB SERPL-MCNC: 0.6 MG/DL
BILIRUBIN URINE: NEGATIVE
BLOOD URINE: NEGATIVE
BUN SERPL-MCNC: 15 MG/DL
C3 SERPL-MCNC: 110 MG/DL
C4 SERPL-MCNC: 29 MG/DL
CALCIUM OXALATE CRYSTALS: PRESENT
CALCIUM SERPL-MCNC: 10 MG/DL
CAST: 0 /LPF
CHLORIDE SERPL-SCNC: 98 MMOL/L
CO2 SERPL-SCNC: 25 MMOL/L
COLOR: YELLOW
CREAT SERPL-MCNC: 0.6 MG/DL
CREAT SPEC-SCNC: 35 MG/DL
CREAT/PROT UR: 0.1 RATIO
CRP SERPL-MCNC: 8.7 MG/L
EGFR: 96 ML/MIN/1.73M2
EOSINOPHIL # BLD AUTO: 0.13 K/UL
EOSINOPHIL NFR BLD AUTO: 1.9 %
EPITHELIAL CELLS: 0 /HPF
ERYTHROCYTE [SEDIMENTATION RATE] IN BLOOD BY WESTERGREN METHOD: 4 MM/HR
GLUCOSE QUALITATIVE U: NEGATIVE MG/DL
GLUCOSE SERPL-MCNC: 109 MG/DL
HCT VFR BLD CALC: 46.2 %
HGB BLD-MCNC: 15.1 G/DL
IMM GRANULOCYTES NFR BLD AUTO: 0.1 %
KETONES URINE: NEGATIVE MG/DL
LEUKOCYTE ESTERASE URINE: ABNORMAL
LYMPHOCYTES # BLD AUTO: 1.4 K/UL
LYMPHOCYTES NFR BLD AUTO: 20.7 %
MAN DIFF?: NORMAL
MCHC RBC-ENTMCNC: 28.3 PG
MCHC RBC-ENTMCNC: 32.7 G/DL
MCV RBC AUTO: 86.7 FL
MICROSCOPIC-UA: NORMAL
MONOCYTES # BLD AUTO: 0.54 K/UL
MONOCYTES NFR BLD AUTO: 8 %
NEUTROPHILS # BLD AUTO: 4.65 K/UL
NEUTROPHILS NFR BLD AUTO: 68.7 %
NITRITE URINE: NEGATIVE
PH URINE: 6.5
PLATELET # BLD AUTO: 196 K/UL
PMV BLD AUTO: 0 /100 WBCS
POTASSIUM SERPL-SCNC: 5 MMOL/L
PROT SERPL-MCNC: 6.8 G/DL
PROT UR-MCNC: 5 MG/DLG/24H
PROTEIN URINE: NEGATIVE MG/DL
RBC # BLD: 5.33 M/UL
RBC # FLD: 13.1 %
RED BLOOD CELLS URINE: 1 /HPF
REVIEW: NORMAL
SODIUM SERPL-SCNC: 135 MMOL/L
SPECIFIC GRAVITY URINE: 1.01
UROBILINOGEN URINE: 0.2 MG/DL
WBC # FLD AUTO: 6.77 K/UL
WHITE BLOOD CELLS URINE: 0 /HPF

## 2025-02-05 ENCOUNTER — LABORATORY RESULT (OUTPATIENT)
Age: 70
End: 2025-02-05

## 2025-03-03 ENCOUNTER — NON-APPOINTMENT (OUTPATIENT)
Age: 70
End: 2025-03-03

## 2025-03-03 ENCOUNTER — APPOINTMENT (OUTPATIENT)
Dept: CARDIOLOGY | Facility: CLINIC | Age: 70
End: 2025-03-03
Payer: MEDICARE

## 2025-03-03 VITALS
WEIGHT: 137 LBS | HEIGHT: 67 IN | HEART RATE: 82 BPM | SYSTOLIC BLOOD PRESSURE: 108 MMHG | BODY MASS INDEX: 21.5 KG/M2 | DIASTOLIC BLOOD PRESSURE: 70 MMHG

## 2025-03-03 DIAGNOSIS — I10 ESSENTIAL (PRIMARY) HYPERTENSION: ICD-10-CM

## 2025-03-03 PROCEDURE — 99213 OFFICE O/P EST LOW 20 MIN: CPT | Mod: 25

## 2025-03-03 PROCEDURE — 93000 ELECTROCARDIOGRAM COMPLETE: CPT

## 2025-03-10 RX ORDER — PREGABALIN 50 MG/1
50 CAPSULE ORAL
Qty: 90 | Refills: 0 | Status: ACTIVE | COMMUNITY
Start: 2025-03-10 | End: 1900-01-01

## 2025-03-18 ENCOUNTER — OUTPATIENT (OUTPATIENT)
Dept: OUTPATIENT SERVICES | Facility: HOSPITAL | Age: 70
LOS: 1 days | End: 2025-03-18
Payer: MEDICARE

## 2025-03-18 DIAGNOSIS — K55.059 ACUTE (REVERSIBLE) ISCHEMIA OF INTESTINE, PART AND EXTENT UNSPECIFIED: Chronic | ICD-10-CM

## 2025-03-18 DIAGNOSIS — Z98.890 OTHER SPECIFIED POSTPROCEDURAL STATES: Chronic | ICD-10-CM

## 2025-03-18 DIAGNOSIS — Z95.828 PRESENCE OF OTHER VASCULAR IMPLANTS AND GRAFTS: Chronic | ICD-10-CM

## 2025-03-18 DIAGNOSIS — M25.551 PAIN IN RIGHT HIP: ICD-10-CM

## 2025-03-18 DIAGNOSIS — M54.50 LOW BACK PAIN, UNSPECIFIED: ICD-10-CM

## 2025-03-18 PROCEDURE — 72110 X-RAY EXAM L-2 SPINE 4/>VWS: CPT | Mod: 26

## 2025-03-18 PROCEDURE — 73502 X-RAY EXAM HIP UNI 2-3 VIEWS: CPT | Mod: RT

## 2025-03-18 PROCEDURE — 73502 X-RAY EXAM HIP UNI 2-3 VIEWS: CPT | Mod: 26,RT

## 2025-03-18 PROCEDURE — 72110 X-RAY EXAM L-2 SPINE 4/>VWS: CPT

## 2025-03-19 DIAGNOSIS — M25.551 PAIN IN RIGHT HIP: ICD-10-CM

## 2025-03-19 DIAGNOSIS — M54.50 LOW BACK PAIN, UNSPECIFIED: ICD-10-CM

## 2025-04-14 ENCOUNTER — APPOINTMENT (OUTPATIENT)
Dept: PAIN MANAGEMENT | Facility: CLINIC | Age: 70
End: 2025-04-14
Payer: MEDICARE

## 2025-04-14 DIAGNOSIS — M46.1 SACROILIITIS, NOT ELSEWHERE CLASSIFIED: ICD-10-CM

## 2025-04-14 DIAGNOSIS — M54.50 LOW BACK PAIN, UNSPECIFIED: ICD-10-CM

## 2025-04-14 PROCEDURE — 99204 OFFICE O/P NEW MOD 45 MIN: CPT

## 2025-04-14 RX ORDER — METHYLPREDNISOLONE 4 MG/1
4 TABLET ORAL
Qty: 1 | Refills: 0 | Status: ACTIVE | COMMUNITY
Start: 2025-04-14 | End: 1900-01-01

## 2025-04-17 ENCOUNTER — APPOINTMENT (OUTPATIENT)
Dept: RHEUMATOLOGY | Facility: CLINIC | Age: 70
End: 2025-04-17
Payer: MEDICARE

## 2025-04-17 VITALS
HEIGHT: 67 IN | HEART RATE: 59 BPM | TEMPERATURE: 97.6 F | BODY MASS INDEX: 21.5 KG/M2 | WEIGHT: 137 LBS | DIASTOLIC BLOOD PRESSURE: 81 MMHG | SYSTOLIC BLOOD PRESSURE: 162 MMHG | OXYGEN SATURATION: 95 %

## 2025-04-17 PROCEDURE — 99214 OFFICE O/P EST MOD 30 MIN: CPT

## 2025-04-17 RX ORDER — MAGNESIUM OXIDE/MAG AA CHELATE 300 MG
CAPSULE ORAL
Refills: 0 | Status: ACTIVE | COMMUNITY

## 2025-05-23 ENCOUNTER — APPOINTMENT (OUTPATIENT)
Dept: PAIN MANAGEMENT | Facility: CLINIC | Age: 70
End: 2025-05-23

## 2025-05-23 DIAGNOSIS — M54.50 LOW BACK PAIN, UNSPECIFIED: ICD-10-CM

## 2025-05-23 DIAGNOSIS — M46.1 SACROILIITIS, NOT ELSEWHERE CLASSIFIED: ICD-10-CM

## 2025-05-23 PROCEDURE — 99214 OFFICE O/P EST MOD 30 MIN: CPT

## 2025-07-08 ENCOUNTER — APPOINTMENT (OUTPATIENT)
Dept: PAIN MANAGEMENT | Facility: CLINIC | Age: 70
End: 2025-07-08
Payer: MEDICARE

## 2025-07-08 PROCEDURE — 99213 OFFICE O/P EST LOW 20 MIN: CPT

## 2025-07-11 ENCOUNTER — APPOINTMENT (OUTPATIENT)
Dept: RHEUMATOLOGY | Facility: CLINIC | Age: 70
End: 2025-07-11
Payer: MEDICARE

## 2025-07-11 VITALS
WEIGHT: 137 LBS | TEMPERATURE: 98 F | BODY MASS INDEX: 21.5 KG/M2 | HEART RATE: 63 BPM | HEIGHT: 67 IN | OXYGEN SATURATION: 97 % | DIASTOLIC BLOOD PRESSURE: 86 MMHG | SYSTOLIC BLOOD PRESSURE: 153 MMHG

## 2025-07-11 PROCEDURE — 99214 OFFICE O/P EST MOD 30 MIN: CPT

## 2025-07-11 RX ORDER — PREGABALIN 50 MG/1
50 CAPSULE ORAL
Qty: 90 | Refills: 1 | Status: ACTIVE | COMMUNITY
Start: 2025-07-11 | End: 1900-01-01

## 2025-07-23 ENCOUNTER — LABORATORY RESULT (OUTPATIENT)
Age: 70
End: 2025-07-23

## 2025-08-26 ENCOUNTER — APPOINTMENT (OUTPATIENT)
Dept: PAIN MANAGEMENT | Facility: CLINIC | Age: 70
End: 2025-08-26
Payer: MEDICARE

## 2025-08-26 DIAGNOSIS — M51.369: ICD-10-CM

## 2025-08-26 DIAGNOSIS — M46.1 SACROILIITIS, NOT ELSEWHERE CLASSIFIED: ICD-10-CM

## 2025-08-26 PROCEDURE — 99214 OFFICE O/P EST MOD 30 MIN: CPT

## 2025-08-26 RX ORDER — METHOCARBAMOL 500 MG/1
500 TABLET, FILM COATED ORAL
Qty: 90 | Refills: 0 | Status: ACTIVE | COMMUNITY
Start: 2025-08-26 | End: 1900-01-01

## 2025-08-28 ENCOUNTER — OUTPATIENT (OUTPATIENT)
Dept: OUTPATIENT SERVICES | Facility: HOSPITAL | Age: 70
LOS: 1 days | End: 2025-08-28
Payer: MEDICARE

## 2025-08-28 DIAGNOSIS — Z00.8 ENCOUNTER FOR OTHER GENERAL EXAMINATION: ICD-10-CM

## 2025-08-28 DIAGNOSIS — K55.059 ACUTE (REVERSIBLE) ISCHEMIA OF INTESTINE, PART AND EXTENT UNSPECIFIED: Chronic | ICD-10-CM

## 2025-08-28 DIAGNOSIS — Z98.890 OTHER SPECIFIED POSTPROCEDURAL STATES: Chronic | ICD-10-CM

## 2025-08-28 DIAGNOSIS — Z12.31 ENCOUNTER FOR SCREENING MAMMOGRAM FOR MALIGNANT NEOPLASM OF BREAST: ICD-10-CM

## 2025-08-28 DIAGNOSIS — Z95.828 PRESENCE OF OTHER VASCULAR IMPLANTS AND GRAFTS: Chronic | ICD-10-CM

## 2025-08-28 DIAGNOSIS — Z13.820 ENCOUNTER FOR SCREENING FOR OSTEOPOROSIS: ICD-10-CM

## 2025-08-28 PROCEDURE — 77067 SCR MAMMO BI INCL CAD: CPT | Mod: 26

## 2025-08-28 PROCEDURE — 77063 BREAST TOMOSYNTHESIS BI: CPT | Mod: 26

## 2025-08-28 PROCEDURE — 77080 DXA BONE DENSITY AXIAL: CPT | Mod: 26

## 2025-08-28 PROCEDURE — 77067 SCR MAMMO BI INCL CAD: CPT

## 2025-08-28 PROCEDURE — 77063 BREAST TOMOSYNTHESIS BI: CPT

## 2025-08-28 PROCEDURE — 77080 DXA BONE DENSITY AXIAL: CPT

## 2025-08-29 DIAGNOSIS — Z13.820 ENCOUNTER FOR SCREENING FOR OSTEOPOROSIS: ICD-10-CM

## 2025-08-29 DIAGNOSIS — Z12.31 ENCOUNTER FOR SCREENING MAMMOGRAM FOR MALIGNANT NEOPLASM OF BREAST: ICD-10-CM

## 2025-09-08 ENCOUNTER — APPOINTMENT (OUTPATIENT)
Dept: CARDIOLOGY | Facility: CLINIC | Age: 70
End: 2025-09-08
Payer: MEDICARE

## 2025-09-08 VITALS
DIASTOLIC BLOOD PRESSURE: 82 MMHG | BODY MASS INDEX: 21.46 KG/M2 | HEART RATE: 61 BPM | SYSTOLIC BLOOD PRESSURE: 138 MMHG | WEIGHT: 137 LBS

## 2025-09-08 DIAGNOSIS — I10 ESSENTIAL (PRIMARY) HYPERTENSION: ICD-10-CM

## 2025-09-08 DIAGNOSIS — E78.5 HYPERLIPIDEMIA, UNSPECIFIED: ICD-10-CM

## 2025-09-08 PROCEDURE — 99214 OFFICE O/P EST MOD 30 MIN: CPT | Mod: 25

## 2025-09-08 PROCEDURE — 93000 ELECTROCARDIOGRAM COMPLETE: CPT

## 2025-09-08 RX ORDER — ATORVASTATIN CALCIUM 20 MG/1
20 TABLET, FILM COATED ORAL DAILY
Qty: 90 | Refills: 3 | Status: ACTIVE | COMMUNITY
Start: 2025-09-08 | End: 1900-01-01